# Patient Record
Sex: MALE | Race: WHITE | Employment: FULL TIME | URBAN - METROPOLITAN AREA
[De-identification: names, ages, dates, MRNs, and addresses within clinical notes are randomized per-mention and may not be internally consistent; named-entity substitution may affect disease eponyms.]

---

## 2018-10-02 ENCOUNTER — HOSPITAL ENCOUNTER (EMERGENCY)
Age: 53
Discharge: HOME | End: 2018-10-02
Attending: EMERGENCY MEDICINE
Payer: COMMERCIAL

## 2018-10-02 ENCOUNTER — APPOINTMENT (OUTPATIENT)
Dept: CT IMAGING | Age: 53
End: 2018-10-02
Attending: EMERGENCY MEDICINE
Payer: COMMERCIAL

## 2018-10-02 VITALS
OXYGEN SATURATION: 95 % | DIASTOLIC BLOOD PRESSURE: 86 MMHG | WEIGHT: 189.15 LBS | HEIGHT: 72 IN | HEART RATE: 80 BPM | TEMPERATURE: 98.2 F | SYSTOLIC BLOOD PRESSURE: 155 MMHG | BODY MASS INDEX: 25.62 KG/M2 | RESPIRATION RATE: 18 BRPM

## 2018-10-02 DIAGNOSIS — N13.2 HYDRONEPHROSIS WITH URINARY OBSTRUCTION DUE TO RENAL CALCULUS: ICD-10-CM

## 2018-10-02 DIAGNOSIS — N20.0 KIDNEY STONE: Primary | ICD-10-CM

## 2018-10-02 DIAGNOSIS — D72.829 LEUKOCYTOSIS, UNSPECIFIED TYPE: ICD-10-CM

## 2018-10-02 LAB
ALBUMIN SERPL-MCNC: 4.2 G/DL (ref 3.5–5)
ALBUMIN/GLOB SERPL: 1.1 {RATIO} (ref 1.1–2.2)
ALP SERPL-CCNC: 88 U/L (ref 45–117)
ALT SERPL-CCNC: 23 U/L (ref 12–78)
ANION GAP SERPL CALC-SCNC: 6 MMOL/L (ref 5–15)
APPEARANCE UR: ABNORMAL
AST SERPL-CCNC: 22 U/L (ref 15–37)
BACTERIA URNS QL MICRO: NEGATIVE /HPF
BASOPHILS # BLD: 0.1 K/UL (ref 0–0.1)
BASOPHILS NFR BLD: 0 % (ref 0–1)
BILIRUB SERPL-MCNC: 0.4 MG/DL (ref 0.2–1)
BILIRUB UR QL: NEGATIVE
BUN SERPL-MCNC: 18 MG/DL (ref 6–20)
BUN/CREAT SERPL: 12 (ref 12–20)
CALCIUM SERPL-MCNC: 9.3 MG/DL (ref 8.5–10.1)
CHLORIDE SERPL-SCNC: 106 MMOL/L (ref 97–108)
CO2 SERPL-SCNC: 28 MMOL/L (ref 21–32)
COLOR UR: ABNORMAL
CREAT SERPL-MCNC: 1.47 MG/DL (ref 0.7–1.3)
DIFFERENTIAL METHOD BLD: ABNORMAL
EOSINOPHIL # BLD: 0 K/UL (ref 0–0.4)
EOSINOPHIL NFR BLD: 0 % (ref 0–7)
EPITH CASTS URNS QL MICRO: ABNORMAL /LPF
ERYTHROCYTE [DISTWIDTH] IN BLOOD BY AUTOMATED COUNT: 13.5 % (ref 11.5–14.5)
GLOBULIN SER CALC-MCNC: 3.9 G/DL (ref 2–4)
GLUCOSE SERPL-MCNC: 135 MG/DL (ref 65–100)
GLUCOSE UR STRIP.AUTO-MCNC: NEGATIVE MG/DL
HCT VFR BLD AUTO: 48.1 % (ref 36.6–50.3)
HGB BLD-MCNC: 16.3 G/DL (ref 12.1–17)
HGB UR QL STRIP: ABNORMAL
HYALINE CASTS URNS QL MICRO: ABNORMAL /LPF (ref 0–5)
IMM GRANULOCYTES # BLD: 0.1 K/UL (ref 0–0.04)
IMM GRANULOCYTES NFR BLD AUTO: 0 % (ref 0–0.5)
KETONES UR QL STRIP.AUTO: NEGATIVE MG/DL
LACTATE SERPL-SCNC: 1.4 MMOL/L (ref 0.4–2)
LEUKOCYTE ESTERASE UR QL STRIP.AUTO: NEGATIVE
LIPASE SERPL-CCNC: 437 U/L (ref 73–393)
LYMPHOCYTES # BLD: 1 K/UL (ref 0.8–3.5)
LYMPHOCYTES NFR BLD: 6 % (ref 12–49)
MCH RBC QN AUTO: 29.4 PG (ref 26–34)
MCHC RBC AUTO-ENTMCNC: 33.9 G/DL (ref 30–36.5)
MCV RBC AUTO: 86.8 FL (ref 80–99)
MONOCYTES # BLD: 0.9 K/UL (ref 0–1)
MONOCYTES NFR BLD: 5 % (ref 5–13)
MUCOUS THREADS URNS QL MICRO: ABNORMAL /LPF
NEUTS SEG # BLD: 14.9 K/UL (ref 1.8–8)
NEUTS SEG NFR BLD: 88 % (ref 32–75)
NITRITE UR QL STRIP.AUTO: NEGATIVE
NRBC # BLD: 0 K/UL (ref 0–0.01)
NRBC BLD-RTO: 0 PER 100 WBC
PH UR STRIP: 5.5 [PH] (ref 5–8)
PLATELET # BLD AUTO: 308 K/UL (ref 150–400)
PMV BLD AUTO: 9.2 FL (ref 8.9–12.9)
POTASSIUM SERPL-SCNC: 4.4 MMOL/L (ref 3.5–5.1)
PROT SERPL-MCNC: 8.1 G/DL (ref 6.4–8.2)
PROT UR STRIP-MCNC: 30 MG/DL
RBC # BLD AUTO: 5.54 M/UL (ref 4.1–5.7)
RBC #/AREA URNS HPF: ABNORMAL /HPF (ref 0–5)
SODIUM SERPL-SCNC: 140 MMOL/L (ref 136–145)
SP GR UR REFRACTOMETRY: 1.03 (ref 1–1.03)
UROBILINOGEN UR QL STRIP.AUTO: 0.2 EU/DL (ref 0.2–1)
WBC # BLD AUTO: 16.9 K/UL (ref 4.1–11.1)
WBC URNS QL MICRO: ABNORMAL /HPF (ref 0–4)

## 2018-10-02 PROCEDURE — 96365 THER/PROPH/DIAG IV INF INIT: CPT

## 2018-10-02 PROCEDURE — 83605 ASSAY OF LACTIC ACID: CPT | Performed by: EMERGENCY MEDICINE

## 2018-10-02 PROCEDURE — 96361 HYDRATE IV INFUSION ADD-ON: CPT

## 2018-10-02 PROCEDURE — 74011000258 HC RX REV CODE- 258: Performed by: EMERGENCY MEDICINE

## 2018-10-02 PROCEDURE — 96375 TX/PRO/DX INJ NEW DRUG ADDON: CPT

## 2018-10-02 PROCEDURE — 83690 ASSAY OF LIPASE: CPT | Performed by: EMERGENCY MEDICINE

## 2018-10-02 PROCEDURE — 81001 URINALYSIS AUTO W/SCOPE: CPT | Performed by: EMERGENCY MEDICINE

## 2018-10-02 PROCEDURE — 74011250636 HC RX REV CODE- 250/636: Performed by: EMERGENCY MEDICINE

## 2018-10-02 PROCEDURE — 85025 COMPLETE CBC W/AUTO DIFF WBC: CPT | Performed by: EMERGENCY MEDICINE

## 2018-10-02 PROCEDURE — 80053 COMPREHEN METABOLIC PANEL: CPT | Performed by: EMERGENCY MEDICINE

## 2018-10-02 PROCEDURE — 99284 EMERGENCY DEPT VISIT MOD MDM: CPT

## 2018-10-02 PROCEDURE — 36415 COLL VENOUS BLD VENIPUNCTURE: CPT | Performed by: EMERGENCY MEDICINE

## 2018-10-02 PROCEDURE — 74177 CT ABD & PELVIS W/CONTRAST: CPT

## 2018-10-02 PROCEDURE — 74011636320 HC RX REV CODE- 636/320: Performed by: EMERGENCY MEDICINE

## 2018-10-02 RX ORDER — SODIUM CHLORIDE 0.9 % (FLUSH) 0.9 %
10 SYRINGE (ML) INJECTION
Status: COMPLETED | OUTPATIENT
Start: 2018-10-02 | End: 2018-10-02

## 2018-10-02 RX ORDER — MORPHINE SULFATE 2 MG/ML
2 INJECTION, SOLUTION INTRAMUSCULAR; INTRAVENOUS
Status: COMPLETED | OUTPATIENT
Start: 2018-10-02 | End: 2018-10-02

## 2018-10-02 RX ORDER — ONDANSETRON 2 MG/ML
4 INJECTION INTRAMUSCULAR; INTRAVENOUS
Status: COMPLETED | OUTPATIENT
Start: 2018-10-02 | End: 2018-10-02

## 2018-10-02 RX ORDER — OXYCODONE AND ACETAMINOPHEN 5; 325 MG/1; MG/1
1 TABLET ORAL
Qty: 10 TAB | Refills: 0 | Status: SHIPPED | OUTPATIENT
Start: 2018-10-02 | End: 2018-11-18 | Stop reason: CLARIF

## 2018-10-02 RX ORDER — SODIUM CHLORIDE 9 MG/ML
50 INJECTION, SOLUTION INTRAVENOUS
Status: COMPLETED | OUTPATIENT
Start: 2018-10-02 | End: 2018-10-02

## 2018-10-02 RX ORDER — ONDANSETRON 4 MG/1
4 TABLET, ORALLY DISINTEGRATING ORAL
Qty: 10 TAB | Refills: 0 | Status: SHIPPED | OUTPATIENT
Start: 2018-10-02 | End: 2018-11-18 | Stop reason: CLARIF

## 2018-10-02 RX ORDER — CEFUROXIME AXETIL 500 MG/1
500 TABLET ORAL 2 TIMES DAILY
Qty: 14 TAB | Refills: 0 | Status: SHIPPED | OUTPATIENT
Start: 2018-10-02 | End: 2018-10-09

## 2018-10-02 RX ADMIN — SODIUM CHLORIDE 50 ML/HR: 900 INJECTION, SOLUTION INTRAVENOUS at 12:35

## 2018-10-02 RX ADMIN — ONDANSETRON 4 MG: 2 INJECTION, SOLUTION INTRAMUSCULAR; INTRAVENOUS at 13:27

## 2018-10-02 RX ADMIN — Medication 10 ML: at 12:35

## 2018-10-02 RX ADMIN — MORPHINE SULFATE 2 MG: 2 INJECTION, SOLUTION INTRAMUSCULAR; INTRAVENOUS at 13:27

## 2018-10-02 RX ADMIN — CEFTRIAXONE 1 G: 1 INJECTION, POWDER, FOR SOLUTION INTRAMUSCULAR; INTRAVENOUS at 13:41

## 2018-10-02 RX ADMIN — IOPAMIDOL 100 ML: 755 INJECTION, SOLUTION INTRAVENOUS at 12:35

## 2018-10-02 RX ADMIN — SODIUM CHLORIDE 1000 ML: 900 INJECTION, SOLUTION INTRAVENOUS at 12:13

## 2018-10-02 NOTE — DISCHARGE INSTRUCTIONS
Kidney Stone: Care Instructions  Your Care Instructions    Kidney stones are formed when salts, minerals, and other substances normally found in the urine clump together. They can be as small as grains of sand or, rarely, as large as golf balls. While the stone is traveling through the ureter, which is the tube that carries urine from the kidney to the bladder, you will probably feel pain. The pain may be mild or very severe. You may also have some blood in your urine. As soon as the stone reaches the bladder, any intense pain should go away. If a stone is too large to pass on its own, you may need a medical procedure to help you pass the stone. The doctor has checked you carefully, but problems can develop later. If you notice any problems or new symptoms, get medical treatment right away. Follow-up care is a key part of your treatment and safety. Be sure to make and go to all appointments, and call your doctor if you are having problems. It's also a good idea to know your test results and keep a list of the medicines you take. How can you care for yourself at home? · Drink plenty of fluids, enough so that your urine is light yellow or clear like water. If you have kidney, heart, or liver disease and have to limit fluids, talk with your doctor before you increase the amount of fluids you drink. · Take pain medicines exactly as directed. Call your doctor if you think you are having a problem with your medicine. ¨ If the doctor gave you a prescription medicine for pain, take it as prescribed. ¨ If you are not taking a prescription pain medicine, ask your doctor if you can take an over-the-counter medicine. Read and follow all instructions on the label. · Your doctor may ask you to strain your urine so that you can collect your kidney stone when it passes. You can use a kitchen strainer or a tea strainer to catch the stone. Store it in a plastic bag until you see your doctor again.   Preventing future kidney stones  Some changes in your diet may help prevent kidney stones. Depending on the cause of your stones, your doctor may recommend that you:  · Drink plenty of fluids, enough so that your urine is light yellow or clear like water. If you have kidney, heart, or liver disease and have to limit fluids, talk with your doctor before you increase the amount of fluids you drink. · Limit coffee, tea, and alcohol. Also avoid grapefruit juice. · Do not take more than the recommended daily dose of vitamins C and D.  · Avoid antacids such as Gaviscon, Maalox, Mylanta, or Tums. · Limit the amount of salt (sodium) in your diet. · Eat a balanced diet that is not too high in protein. · Limit foods that are high in a substance called oxalate, which can cause kidney stones. These foods include dark green vegetables, rhubarb, chocolate, wheat bran, nuts, cranberries, and beans. When should you call for help? Call your doctor now or seek immediate medical care if:    · You cannot keep down fluids.     · Your pain gets worse.     · You have a fever or chills.     · You have new or worse pain in your back just below your rib cage (the flank area).     · You have new or more blood in your urine.    Watch closely for changes in your health, and be sure to contact your doctor if:    · You do not get better as expected. Where can you learn more? Go to http://aydee-tom.info/. Enter Z305 in the search box to learn more about \"Kidney Stone: Care Instructions. \"  Current as of: May 12, 2017  Content Version: 11.7  © 4510-5480 [employer]. Care instructions adapted under license by [employer] (which disclaims liability or warranty for this information). If you have questions about a medical condition or this instruction, always ask your healthcare professional. [address] any warranty or liability for your use of this information.   [employer] Activation    Thank you for requesting access to [employer]ocket[employer]. Please follow the instructions below to securely access and download your online medical record. PocketMobile allows you to send messages to your doctor, view your test results, renew your prescriptions, schedule appointments, and more. How Do I Sign Up? 1. In your internet browser, go to [email or web address]  2. Click on the First Time User? Click Here link in the Sign In box. You will be redirect to the New Member Sign Up page. 3. Enter your PocketMobile Access Code exactly as it appears below. You will not need to use this code after youve completed the sign-up process. If you do not sign up before the expiration date, you must request a new code. PocketMobile Access Code: [email or web address]  Expires: 2018  9:15 AM (This is the date your PocketMobile access code will )    4. Enter the last four digits of your Social Security Number (xxxx) and Date of Birth (mm/dd/yyyy) as indicated and click Submit. You will be taken to the next sign-up page. 5. Create a PocketMobile ID. This will be your PocketMobile login ID and cannot be changed, so think of one that is secure and easy to remember. 6. Create a PocketMobile password. You can change your password at any time. 7. Enter your Password Reset Question and Answer. This can be used at a later time if you forget your password. 8. Enter your e-mail address. You will receive e-mail notification when new information is available in [address] 9. Click Sign Up. You can now view and download portions of your medical record. 10. Click the Download Summary menu link to download a portable copy of your medical information. Additional Information    If you have questions, please visit the Frequently Asked Questions section of the PocketMobile website at [email or web address] Remember, PocketMobile is NOT to be used for urgent needs. For medical emergencies, dial 911.

## 2018-10-02 NOTE — LETTER
[address] 
[facility] EMERGENCY DEPT 
[address] [facility] [id number] [phone] Work/School Note Date: 10/2/2018 To Whom It May concern: 
 
Tanya Del Cid was seen and treated today in the emergency room by the following provider(s): 
Attending Provider: Aditya Velasquez MD. Tanya Del Cid No work till 10/4/18 Sincerely, Aditya Velasquez MD

## 2018-10-02 NOTE — ED PROVIDER NOTES
EMERGENCY DEPARTMENT HISTORY AND PHYSICAL EXAM 
 
 
Date: 10/2/2018 Patient Name: Princess Jacobson History of Presenting Illness Chief Complaint Patient presents with  Abdominal Pain The patient presents to the ED with complaints of abdominal pain with nausea and vomiting that started last night.  Nausea  Vomiting History Provided By: Patient HPI: Princess Jacobson, 46 y.o. male with no pertinent PMHx, presents via wheelchair to the ED with cc of new onset of generalized abdominal cramping progressively worsening x last night. Pt reports associated sx of nausea, vomiting, and constipation. He expresses he began with generalized abdominal cramping last night with episodes of nausea and vomiting noting he initially attributed his sx to constipation. Pt discloses his LBM was 2-3 days ago which is abnormal for him. He notes a family h/o colon cancer and became worried leading him to the ED. Pt denies ever having a colonoscopy. There are no exacerbating or alleviating factors to his discomfort. He denies any fevers, chills, chest pain, SOB, diarrhea, hematochezia, melena, or dysuria. There are no other complaints, changes, or physical findings at this time. PCP: Lay Silva MD 
SHx: (+) Tobacco; (+) ETOH: occasional; (-) illicit drug use Past History Past Medical History: No past medical history on file. Past Surgical History: No past surgical history on file. Family History: No family history on file. Social History: 
Social History Substance Use Topics  Smoking status: Not on file  Smokeless tobacco: Not on file  Alcohol use Not on file Allergies: 
No Known Allergies Review of Systems Review of Systems Constitutional: Negative. Negative for chills and fever. HENT: Negative. Negative for congestion, rhinorrhea and sinus pressure. Eyes: Negative. Negative for discharge and redness. Respiratory: Negative. Negative for cough, chest tightness and shortness of breath. Cardiovascular: Negative. Negative for chest pain. Gastrointestinal: Positive for abdominal pain (diffuse cramping ), constipation, nausea and vomiting. Negative for blood in stool and diarrhea. Endocrine: Negative. Genitourinary: Negative. Negative for flank pain and hematuria. Musculoskeletal: Negative. Negative for back pain. Skin: Negative. Negative for rash. Neurological: Negative. Negative for dizziness, seizures, weakness, numbness and headaches. Hematological: Negative. All other systems reviewed and are negative. Physical Exam  
Physical Exam  
Constitutional: He is oriented to person, place, and time. He appears well-developed and well-nourished. No distress. HENT:  
Head: Normocephalic and atraumatic. Nose: Nose normal.  
Mouth/Throat: No oropharyngeal exudate. Eyes: Conjunctivae and EOM are normal. Pupils are equal, round, and reactive to light. No scleral icterus. Neck: Normal range of motion. Neck supple. No JVD present. No thyromegaly present. Cardiovascular: Normal rate, regular rhythm, normal heart sounds, intact distal pulses and normal pulses. PMI is not displaced. Exam reveals no gallop and no friction rub. No murmur heard. Pulmonary/Chest: Effort normal and breath sounds normal. No stridor. No respiratory distress. He has no decreased breath sounds. He has no wheezes. He has no rhonchi. He has no rales. He exhibits no tenderness. Abdominal: Soft. Normal aorta and bowel sounds are normal. He exhibits no distension, no abdominal bruit and no mass. There is no hepatosplenomegaly. There is tenderness in the right upper quadrant, right lower quadrant and periumbilical area. There is no rigidity, no rebound, no guarding, no CVA tenderness, no tenderness at McBurney's point and negative Aguayo's sign. No hernia. Neurological: He is alert and oriented to person, place, and time. He has normal reflexes. He displays no atrophy and no tremor. No cranial nerve deficit or sensory deficit. He exhibits normal muscle tone. He displays no seizure activity. Coordination normal. GCS eye subscore is 4. GCS verbal subscore is 5. GCS motor subscore is 6. Reflex Scores: 
     Patellar reflexes are 2+ on the right side and 2+ on the left side. Skin: Skin is warm. No rash noted. He is not diaphoretic. No erythema. Nursing note and vitals reviewed. Diagnostic Study Results Labs - Recent Results (from the past 12 hour(s)) URINALYSIS W/ RFLX MICROSCOPIC Collection Time: 10/02/18 10:15 AM  
Result Value Ref Range Color YELLOW/STRAW Appearance TURBID (A) CLEAR Specific gravity 1.026 1.003 - 1.030    
 pH (UA) 5.5 5.0 - 8.0 Protein 30 (A) NEG mg/dL Glucose NEGATIVE  NEG mg/dL Ketone NEGATIVE  NEG mg/dL Bilirubin NEGATIVE  NEG Blood TRACE (A) NEG Urobilinogen 0.2 0.2 - 1.0 EU/dL Nitrites NEGATIVE  NEG Leukocyte Esterase NEGATIVE  NEG    
 WBC 0-4 0 - 4 /hpf  
 RBC 0-5 0 - 5 /hpf Epithelial cells FEW FEW /lpf Bacteria NEGATIVE  NEG /hpf Mucus 1+ (A) NEG /lpf Hyaline cast 0-2 0 - 5 /lpf  
CBC WITH AUTOMATED DIFF Collection Time: 10/02/18 10:30 AM  
Result Value Ref Range WBC 16.9 (H) 4.1 - 11.1 K/uL  
 RBC 5.54 4.10 - 5.70 M/uL  
 HGB 16.3 12.1 - 17.0 g/dL HCT 48.1 36.6 - 50.3 % MCV 86.8 80.0 - 99.0 FL  
 MCH 29.4 26.0 - 34.0 PG  
 MCHC 33.9 30.0 - 36.5 g/dL  
 RDW 13.5 11.5 - 14.5 % PLATELET 454 231 - 742 K/uL MPV 9.2 8.9 - 12.9 FL  
 NRBC 0.0 0  WBC ABSOLUTE NRBC 0.00 0.00 - 0.01 K/uL NEUTROPHILS 88 (H) 32 - 75 % LYMPHOCYTES 6 (L) 12 - 49 % MONOCYTES 5 5 - 13 % EOSINOPHILS 0 0 - 7 % BASOPHILS 0 0 - 1 % IMMATURE GRANULOCYTES 0 0.0 - 0.5 % ABS. NEUTROPHILS 14.9 (H) 1.8 - 8.0 K/UL  
 ABS. LYMPHOCYTES 1.0 0.8 - 3.5 K/UL ABS. MONOCYTES 0.9 0.0 - 1.0 K/UL  
 ABS. EOSINOPHILS 0.0 0.0 - 0.4 K/UL  
 ABS. BASOPHILS 0.1 0.0 - 0.1 K/UL  
 ABS. IMM. GRANS. 0.1 (H) 0.00 - 0.04 K/UL  
 DF AUTOMATED METABOLIC PANEL, COMPREHENSIVE Collection Time: 10/02/18 10:30 AM  
Result Value Ref Range Sodium 140 136 - 145 mmol/L Potassium 4.4 3.5 - 5.1 mmol/L Chloride 106 97 - 108 mmol/L  
 CO2 28 21 - 32 mmol/L Anion gap 6 5 - 15 mmol/L Glucose 135 (H) 65 - 100 mg/dL BUN 18 6 - 20 MG/DL Creatinine 1.47 (H) 0.70 - 1.30 MG/DL  
 BUN/Creatinine ratio 12 12 - 20 GFR est AA >60 >60 ml/min/1.73m2 GFR est non-AA 50 (L) >60 ml/min/1.73m2 Calcium 9.3 8.5 - 10.1 MG/DL Bilirubin, total 0.4 0.2 - 1.0 MG/DL  
 ALT (SGPT) 23 12 - 78 U/L  
 AST (SGOT) 22 15 - 37 U/L Alk. phosphatase 88 45 - 117 U/L Protein, total 8.1 6.4 - 8.2 g/dL Albumin 4.2 3.5 - 5.0 g/dL Globulin 3.9 2.0 - 4.0 g/dL A-G Ratio 1.1 1.1 - 2.2 LIPASE Collection Time: 10/02/18 10:30 AM  
Result Value Ref Range Lipase 437 (H) 73 - 393 U/L  
LACTIC ACID Collection Time: 10/02/18 12:14 PM  
Result Value Ref Range Lactic acid 1.4 0.4 - 2.0 MMOL/L Radiologic Studies -  
CT Results  (Last 48 hours) 10/02/18 1235  CT ABD PELV W CONT Final result Impression:  IMPRESSION: 2 mm right ureterovesical junction calculus with obstruction,  
hydronephrosis, hydroureter and perinephric and periureteral stranding. Chester Sheppard Narrative:  EXAM: CT ABDOMEN PELVIS WITH CONTRAST INDICATION: Right lower quadrant pain. .  
COMPARISON: None. CONTRAST: 100 mL of Isovue-370. TECHNIQUE:   
Multislice helical CT was performed from the diaphragm to the symphysis pubis  
during uneventful rapid bolus intravenous contrast administration. Oral contrast  
was not administered. Contiguous 5 mm axial images were reconstructed and lung  
and soft tissue windows were generated. Coronal and sagittal reformations were generated. CT dose reduction was achieved through use of a standardized protocol  
tailored for this examination and automatic exposure control for dose  
modulation. FINDINGS:  
LOWER CHEST: The visualized portions of the lung bases are clear. ABDOMEN:  
Liver: The liver is normal in size and contour with no focal abnormality. Gallbladder and bile ducts: There is no calcified gallstone or biliary  
dilatation. Spleen: No abnormality. Pancreas: No abnormality. Adrenal glands: No abnormality. Kidneys: The right kidney is enlarged with perinephric stranding and there is  
hydronephrosis and hydroureter down to the level of the 2 mm stone at the right  
ureterovesical junction. There is also perirenal stranding. There is no stone or  
other abnormality on the left. PELVIS:  
Reproductive organs: The prostate gland and seminal vesicles are symmetrical.   
Bladder: No abnormality. BOWEL AND MESENTERY: The small bowel is normal.  There is no mesenteric mass or  
adenopathy. The appendix is normal.  
PERITONEUM: There is no ascites or free intraperitoneal air. RETROPERITONEUM: The aorta  tapers without aneurysm. There is no retroperitoneal  
adenopathy or mass. There is no pelvic mass or adenopathy. BONES AND SOFT TISSUES: The bones and soft tissues of the abdominal wall are  
within normal limits. Medical Decision Making I am the first provider for this patient. I reviewed the vital signs, available nursing notes, past medical history, past surgical history, family history and social history. Vital Signs-Reviewed the patient's vital signs. Patient Vitals for the past 12 hrs: 
 Temp Pulse Resp BP SpO2  
10/02/18 1309 - - - 149/88 -  
10/02/18 1130 - - - - 96 % 10/02/18 0918 98.2 °F (36.8 °C) 80 18 (!) 165/101 97 % Pulse Oximetry Analysis - 97% on room air Cardiac Monitor:  
Rate: 80 bpm 
Rhythm: Normal Sinus Rhythm Records Reviewed: Nursing Notes, Old Medical Records, Previous electrocardiograms and Previous Radiology Studies Provider Notes (Medical Decision Making): DDx: Appendicitis, diverticulitis, perforated viscus, bowel obstruction, hernia, AAA, constipation, kidney stone Impression Plan: healthy male presents with progressive abdominal pain, nausea, vomiting since last night. Clinically tender RLQ. Will obtain CT, labs and make final disposition pending those results. ED Course:  
Initial assessment performed. The patients presenting problems have been discussed, and they are in agreement with the care plan formulated and outlined with them. I have encouraged them to ask questions as they arise throughout their visit. Progress Notes: 
 
1:10 PM  
The pt has been re-evaluated. Pt was updated on CT significant for kidney stone. Pt is requesting medication for discomfort. Will provide symptomatic management and consult urology. CONSULT NOTE: 
1:30 PM 
Kristie Walker MD spoke with Dr. Srikanth Echevarria, Specialty: Urology Discussed pt's hx, disposition, and available diagnostic and imaging results. Reviewed care plans. Consultant recommends if the pt's pain and nausea can be controlled he is stable for discharge home with follow up in office tomorrow. Consultant advises starting the pt on abx. Written by ELMO Daniel, as dictated by Kristie Walker MD 
 
1:37 PM  
The pt has been re-evaluated. Pt has no signs of sepsis, has tolerated PO. Will try outpatient follow up close to urology. Tobacco Counseling Discussed the risks of smoking and the benefits of smoking cessation as well as the long term sequelae of smoking with the patient. The patient verbalized their understanding. Critical Care Time: 0 minutes Disposition: 
Discharge Note: 
1:37 PM 
The patient is ready for discharge.  The patient's signs, symptoms, diagnosis, and discharge instruction have been discussed and the patient has conveyed their understanding. The patient is to follow up as recommended or return to the ER should their symptoms worsen. Plan has been discussed and the patient is in agreement. Written by Alden Brantley ED Scribe, as dictated by Jaleesa Carrizales MD 
 
PLAN: 
1. Current Discharge Medication List  
  
START taking these medications Details  
cefUROXime (CEFTIN) 500 mg tablet Take 1 Tab by mouth two (2) times a day for 7 days. Qty: 14 Tab, Refills: 0 Associated Diagnoses: Kidney stone  
  
oxyCODONE-acetaminophen (PERCOCET) 5-325 mg per tablet Take 1 Tab by mouth every four (4) hours as needed for Pain. Max Daily Amount: 6 Tabs. Qty: 10 Tab, Refills: 0 Associated Diagnoses: Kidney stone  
  
ondansetron (ZOFRAN ODT) 4 mg disintegrating tablet Take 1 Tab by mouth every eight (8) hours as needed for Nausea. Qty: 10 Tab, Refills: 0 Associated Diagnoses: Kidney stone 2. Follow-up Information Follow up With Details Comments Contact Info Jaswant Hanson MD Schedule an appointment as soon as possible for a visit in 1 day  [facility] [address] [facility] 
[phone] [facility] EMERGENCY DEPT  If symptoms worsen [address] [facility] 
[phone] Return to ED if worse Diagnosis Clinical Impression: 1. Kidney stone 2. Hydronephrosis with urinary obstruction due to renal calculus 3. Leukocytosis, unspecified type Attestations: 
 
Attestation: This note is prepared by Kasey Lombard. Barrett, acting as Scribe for Jaleesa Carrizales MD. Jaleesa Carrizales MD: The scribe's documentation has been prepared under my direction and personally reviewed by me in its entirety. I confirm that the note above accurately reflects all work, treatment, procedures, and medical decision making performed by me.

## 2018-10-02 NOTE — ED NOTES
Patient discharge instructions reviewed with patient by MD Sg Beckett. Patient verbalized understanding. Per MD, patient ok for discharge after antibiotics complete.

## 2018-10-02 NOTE — ED NOTES
Patient back from CT, reattached to monitor. MD Minus  came to bedside to update patient on plan of care.

## 2018-11-18 ENCOUNTER — HOSPITAL ENCOUNTER (INPATIENT)
Age: 53
LOS: 7 days | Discharge: HOME HEALTH | DRG: 234 | End: 2018-11-25
Attending: EMERGENCY MEDICINE | Admitting: THORACIC SURGERY (CARDIOTHORACIC VASCULAR SURGERY)
Payer: COMMERCIAL

## 2018-11-18 ENCOUNTER — APPOINTMENT (OUTPATIENT)
Dept: GENERAL RADIOLOGY | Age: 53
DRG: 234 | End: 2018-11-18
Attending: EMERGENCY MEDICINE
Payer: COMMERCIAL

## 2018-11-18 DIAGNOSIS — I20.8 ANGINA EFFORT: ICD-10-CM

## 2018-11-18 DIAGNOSIS — I24.9 ACS (ACUTE CORONARY SYNDROME) (HCC): ICD-10-CM

## 2018-11-18 DIAGNOSIS — Z95.1 S/P CABG X 2: ICD-10-CM

## 2018-11-18 DIAGNOSIS — R77.8 ELEVATED TROPONIN: ICD-10-CM

## 2018-11-18 DIAGNOSIS — I21.4 NSTEMI (NON-ST ELEVATED MYOCARDIAL INFARCTION) (HCC): Primary | ICD-10-CM

## 2018-11-18 PROBLEM — F17.200 TOBACCO USE DISORDER: Status: ACTIVE | Noted: 2018-11-18

## 2018-11-18 PROBLEM — E78.2 MIXED HYPERLIPIDEMIA: Status: ACTIVE | Noted: 2018-11-18

## 2018-11-18 LAB
ALBUMIN SERPL-MCNC: 4 G/DL (ref 3.5–5)
ALBUMIN/GLOB SERPL: 1.3 {RATIO} (ref 1.1–2.2)
ALP SERPL-CCNC: 80 U/L (ref 45–117)
ALT SERPL-CCNC: 30 U/L (ref 12–78)
ANION GAP SERPL CALC-SCNC: 5 MMOL/L (ref 5–15)
AST SERPL-CCNC: 27 U/L (ref 15–37)
BASOPHILS # BLD: 0.1 K/UL (ref 0–0.1)
BASOPHILS NFR BLD: 1 % (ref 0–1)
BILIRUB SERPL-MCNC: 0.5 MG/DL (ref 0.2–1)
BUN SERPL-MCNC: 13 MG/DL (ref 6–20)
BUN/CREAT SERPL: 12 (ref 12–20)
CALCIUM SERPL-MCNC: 9 MG/DL (ref 8.5–10.1)
CHLORIDE SERPL-SCNC: 106 MMOL/L (ref 97–108)
CK SERPL-CCNC: 227 U/L (ref 39–308)
CO2 SERPL-SCNC: 30 MMOL/L (ref 21–32)
CREAT SERPL-MCNC: 1.09 MG/DL (ref 0.7–1.3)
DIFFERENTIAL METHOD BLD: ABNORMAL
EOSINOPHIL # BLD: 0.1 K/UL (ref 0–0.4)
EOSINOPHIL NFR BLD: 1 % (ref 0–7)
ERYTHROCYTE [DISTWIDTH] IN BLOOD BY AUTOMATED COUNT: 13.8 % (ref 11.5–14.5)
GLOBULIN SER CALC-MCNC: 3.2 G/DL (ref 2–4)
GLUCOSE SERPL-MCNC: 107 MG/DL (ref 65–100)
HCT VFR BLD AUTO: 44.3 % (ref 36.6–50.3)
HGB BLD-MCNC: 15.1 G/DL (ref 12.1–17)
IMM GRANULOCYTES # BLD: 0.1 K/UL (ref 0–0.04)
IMM GRANULOCYTES NFR BLD AUTO: 0 % (ref 0–0.5)
LYMPHOCYTES # BLD: 2.2 K/UL (ref 0.8–3.5)
LYMPHOCYTES NFR BLD: 18 % (ref 12–49)
MCH RBC QN AUTO: 29.8 PG (ref 26–34)
MCHC RBC AUTO-ENTMCNC: 34.1 G/DL (ref 30–36.5)
MCV RBC AUTO: 87.5 FL (ref 80–99)
MONOCYTES # BLD: 0.7 K/UL (ref 0–1)
MONOCYTES NFR BLD: 5 % (ref 5–13)
NEUTS SEG # BLD: 9.3 K/UL (ref 1.8–8)
NEUTS SEG NFR BLD: 76 % (ref 32–75)
NRBC # BLD: 0 K/UL (ref 0–0.01)
NRBC BLD-RTO: 0 PER 100 WBC
PLATELET # BLD AUTO: 311 K/UL (ref 150–400)
PMV BLD AUTO: 9.3 FL (ref 8.9–12.9)
POTASSIUM SERPL-SCNC: 3.5 MMOL/L (ref 3.5–5.1)
PROT SERPL-MCNC: 7.2 G/DL (ref 6.4–8.2)
RBC # BLD AUTO: 5.06 M/UL (ref 4.1–5.7)
SODIUM SERPL-SCNC: 141 MMOL/L (ref 136–145)
TROPONIN I SERPL-MCNC: 2.15 NG/ML
WBC # BLD AUTO: 12.4 K/UL (ref 4.1–11.1)

## 2018-11-18 PROCEDURE — 36415 COLL VENOUS BLD VENIPUNCTURE: CPT

## 2018-11-18 PROCEDURE — 74011250637 HC RX REV CODE- 250/637: Performed by: INTERNAL MEDICINE

## 2018-11-18 PROCEDURE — 65660000000 HC RM CCU STEPDOWN

## 2018-11-18 PROCEDURE — 74011250637 HC RX REV CODE- 250/637: Performed by: EMERGENCY MEDICINE

## 2018-11-18 PROCEDURE — 80053 COMPREHEN METABOLIC PANEL: CPT

## 2018-11-18 PROCEDURE — 71046 X-RAY EXAM CHEST 2 VIEWS: CPT

## 2018-11-18 PROCEDURE — 82550 ASSAY OF CK (CPK): CPT

## 2018-11-18 PROCEDURE — 84484 ASSAY OF TROPONIN QUANT: CPT

## 2018-11-18 PROCEDURE — 74011250636 HC RX REV CODE- 250/636: Performed by: INTERNAL MEDICINE

## 2018-11-18 PROCEDURE — 93005 ELECTROCARDIOGRAM TRACING: CPT

## 2018-11-18 PROCEDURE — 96372 THER/PROPH/DIAG INJ SC/IM: CPT

## 2018-11-18 PROCEDURE — 99285 EMERGENCY DEPT VISIT HI MDM: CPT

## 2018-11-18 PROCEDURE — 85025 COMPLETE CBC W/AUTO DIFF WBC: CPT

## 2018-11-18 RX ORDER — ZOLPIDEM TARTRATE 5 MG/1
10 TABLET ORAL
Status: DISCONTINUED | OUTPATIENT
Start: 2018-11-18 | End: 2018-11-20

## 2018-11-18 RX ORDER — ATORVASTATIN CALCIUM 40 MG/1
40 TABLET, FILM COATED ORAL DAILY
Status: DISCONTINUED | OUTPATIENT
Start: 2018-11-18 | End: 2018-11-25 | Stop reason: HOSPADM

## 2018-11-18 RX ORDER — SODIUM CHLORIDE 0.9 % (FLUSH) 0.9 %
5-10 SYRINGE (ML) INJECTION EVERY 8 HOURS
Status: DISCONTINUED | OUTPATIENT
Start: 2018-11-18 | End: 2018-11-20

## 2018-11-18 RX ORDER — ENOXAPARIN SODIUM 100 MG/ML
1 INJECTION SUBCUTANEOUS ONCE
Status: COMPLETED | OUTPATIENT
Start: 2018-11-18 | End: 2018-11-18

## 2018-11-18 RX ORDER — NITROGLYCERIN 0.4 MG/1
0.4 TABLET SUBLINGUAL
Status: DISCONTINUED | OUTPATIENT
Start: 2018-11-18 | End: 2018-11-20

## 2018-11-18 RX ORDER — METOPROLOL TARTRATE 25 MG/1
25 TABLET, FILM COATED ORAL EVERY 12 HOURS
Status: DISCONTINUED | OUTPATIENT
Start: 2018-11-18 | End: 2018-11-20

## 2018-11-18 RX ORDER — SODIUM CHLORIDE 9 MG/ML
100 INJECTION, SOLUTION INTRAVENOUS CONTINUOUS
Status: DISCONTINUED | OUTPATIENT
Start: 2018-11-19 | End: 2018-11-20

## 2018-11-18 RX ORDER — ASPIRIN 81 MG/1
81 TABLET ORAL DAILY
Status: DISCONTINUED | OUTPATIENT
Start: 2018-11-19 | End: 2018-11-25 | Stop reason: HOSPADM

## 2018-11-18 RX ORDER — GUAIFENESIN 100 MG/5ML
162 LIQUID (ML) ORAL
Status: COMPLETED | OUTPATIENT
Start: 2018-11-18 | End: 2018-11-18

## 2018-11-18 RX ORDER — SODIUM CHLORIDE 0.9 % (FLUSH) 0.9 %
5-10 SYRINGE (ML) INJECTION AS NEEDED
Status: DISCONTINUED | OUTPATIENT
Start: 2018-11-18 | End: 2018-11-20

## 2018-11-18 RX ADMIN — Medication 10 ML: at 22:38

## 2018-11-18 RX ADMIN — ENOXAPARIN SODIUM 90 MG: 100 INJECTION, SOLUTION INTRAVENOUS; SUBCUTANEOUS at 18:22

## 2018-11-18 RX ADMIN — NITROGLYCERIN 1 INCH: 20 OINTMENT TOPICAL at 18:23

## 2018-11-18 RX ADMIN — METOPROLOL TARTRATE 25 MG: 25 TABLET ORAL at 22:37

## 2018-11-18 RX ADMIN — ASPIRIN 81 MG 162 MG: 81 TABLET ORAL at 17:14

## 2018-11-18 RX ADMIN — ATORVASTATIN CALCIUM 40 MG: 40 TABLET, FILM COATED ORAL at 18:23

## 2018-11-18 NOTE — ED PROVIDER NOTES
EMERGENCY DEPARTMENT HISTORY AND PHYSICAL EXAM 
 
 
Date: 11/18/2018 Patient Name: Jocelyne Tirado History of Presenting Illness Chief Complaint Patient presents with  Chest Pain  
  pt reports chest pain with left arm numbness today around noon while weed eating, reports he stopped and laid down, pain returned with he was going up and down steps History Provided By: Patient HPI: Jocelyne Tirado, 46 y.o. male with no significant PMHx, presents himself w/ family to the ED with cc of intermittent, sharp mid CP since today (11/18/18). Pt reports associated left arm numbness since onset of pain. He states that initial episode of CP came onto him while he was weeding approximately 6hrs ago today. Pt states that the episode of CP lasted for about 5 minutes until it resolved. He reports of a second episode of CP at around 3:00 PM of which he took a baby ASA. Pt notes no significant improvement of pain with taking the baby ASA. He endorses exacerbation of pain with exertion. Pt denies any recent long distance travels  Additionally, pt specifically denies any recent fever, chills, headache, nausea, vomiting, diarrhea, abdominal pain, SOB, lightheadedness, dizziness, weakness, BLE swelling, heart palpitations, urinary sxs, changes in BM, changes in PO intake, melena, hematochezia, cough, or congestion. PCP: Lani Campos MD 
 
Social Hx: +tobacco, -EtOH, -Illicit Drugs There are no other complaints, changes or physical findings at this time. Past History Past Medical History: No past medical history on file. Past Surgical History: No past surgical history on file. Family History: No family history on file. Social History: 
Social History Tobacco Use  Smoking status: Not on file Substance Use Topics  Alcohol use: Not on file  Drug use: Not on file Allergies: 
No Known Allergies Review of Systems Review of Systems Constitutional: Negative. Negative for chills and fever. HENT: Negative. Negative for congestion, facial swelling, rhinorrhea, sore throat, trouble swallowing and voice change. Eyes: Negative. Respiratory: Negative. Negative for apnea, cough, chest tightness, shortness of breath and wheezing. Cardiovascular: Positive for chest pain (mid). Negative for palpitations and leg swelling. Gastrointestinal: Negative. Negative for abdominal distention, abdominal pain, blood in stool, constipation, diarrhea, nausea and vomiting. Endocrine: Negative. Negative for cold intolerance, heat intolerance and polyuria. Genitourinary: Negative. Negative for difficulty urinating, dysuria, flank pain, frequency, hematuria and urgency. Musculoskeletal: Negative. Negative for arthralgias, back pain, myalgias, neck pain and neck stiffness. Skin: Negative. Negative for color change and rash. Neurological: Positive for numbness (left arm). Negative for dizziness, syncope, facial asymmetry, speech difficulty, weakness, light-headedness and headaches. Hematological: Negative. Does not bruise/bleed easily. Psychiatric/Behavioral: Negative. Negative for confusion and self-injury. The patient is not nervous/anxious. Physical Exam  
Physical Exam  
Constitutional: He is oriented to person, place, and time. He appears well-nourished. He is cooperative. Non-toxic appearance. He has a sickly appearance. He appears ill. He appears distressed. HENT:  
Head: Atraumatic. Mouth/Throat: Mucous membranes are normal. No posterior oropharyngeal erythema. Eyes: Conjunctivae and EOM are normal. Pupils are equal, round, and reactive to light. Neck: Normal range of motion. Cardiovascular: Normal rate, regular rhythm, normal heart sounds and intact distal pulses. Exam reveals no gallop and no friction rub. No murmur heard.  
Pulmonary/Chest: Effort normal and breath sounds normal. No respiratory distress. He has no wheezes. He has no rales. He exhibits no tenderness. Abdominal: Soft. Bowel sounds are normal. He exhibits no distension and no mass. There is no tenderness. There is no rebound and no guarding. Musculoskeletal: Normal range of motion. He exhibits no edema, tenderness or deformity. Neurological: He is alert and oriented to person, place, and time. He displays normal reflexes. No cranial nerve deficit. He exhibits normal muscle tone. Coordination normal.  
Skin: Skin is warm. No rash noted. Psychiatric: He has a normal mood and affect. Nursing note and vitals reviewed. Diagnostic Study Results Labs - Recent Results (from the past 12 hour(s)) CBC WITH AUTOMATED DIFF Collection Time: 11/18/18  5:02 PM  
Result Value Ref Range WBC 12.4 (H) 4.1 - 11.1 K/uL  
 RBC 5.06 4.10 - 5.70 M/uL  
 HGB 15.1 12.1 - 17.0 g/dL HCT 44.3 36.6 - 50.3 % MCV 87.5 80.0 - 99.0 FL  
 MCH 29.8 26.0 - 34.0 PG  
 MCHC 34.1 30.0 - 36.5 g/dL  
 RDW 13.8 11.5 - 14.5 % PLATELET 154 933 - 324 K/uL MPV 9.3 8.9 - 12.9 FL  
 NRBC 0.0 0  WBC ABSOLUTE NRBC 0.00 0.00 - 0.01 K/uL NEUTROPHILS 76 (H) 32 - 75 % LYMPHOCYTES 18 12 - 49 % MONOCYTES 5 5 - 13 % EOSINOPHILS 1 0 - 7 % BASOPHILS 1 0 - 1 % IMMATURE GRANULOCYTES 0 0.0 - 0.5 % ABS. NEUTROPHILS 9.3 (H) 1.8 - 8.0 K/UL  
 ABS. LYMPHOCYTES 2.2 0.8 - 3.5 K/UL  
 ABS. MONOCYTES 0.7 0.0 - 1.0 K/UL  
 ABS. EOSINOPHILS 0.1 0.0 - 0.4 K/UL  
 ABS. BASOPHILS 0.1 0.0 - 0.1 K/UL  
 ABS. IMM. GRANS. 0.1 (H) 0.00 - 0.04 K/UL  
 DF AUTOMATED METABOLIC PANEL, COMPREHENSIVE Collection Time: 11/18/18  5:02 PM  
Result Value Ref Range Sodium 141 136 - 145 mmol/L Potassium 3.5 3.5 - 5.1 mmol/L Chloride 106 97 - 108 mmol/L  
 CO2 30 21 - 32 mmol/L Anion gap 5 5 - 15 mmol/L Glucose 107 (H) 65 - 100 mg/dL BUN 13 6 - 20 MG/DL  Creatinine 1.09 0.70 - 1.30 MG/DL  
 BUN/Creatinine ratio 12 12 - 20    
 GFR est AA >60 >60 ml/min/1.73m2 GFR est non-AA >60 >60 ml/min/1.73m2 Calcium 9.0 8.5 - 10.1 MG/DL Bilirubin, total 0.5 0.2 - 1.0 MG/DL  
 ALT (SGPT) 30 12 - 78 U/L  
 AST (SGOT) 27 15 - 37 U/L Alk. phosphatase 80 45 - 117 U/L Protein, total 7.2 6.4 - 8.2 g/dL Albumin 4.0 3.5 - 5.0 g/dL Globulin 3.2 2.0 - 4.0 g/dL A-G Ratio 1.3 1.1 - 2.2 CK W/ REFLX CKMB Collection Time: 11/18/18  5:02 PM  
Result Value Ref Range  39 - 308 U/L  
TROPONIN I Collection Time: 11/18/18  5:02 PM  
Result Value Ref Range Troponin-I, Qt. 2.15 (H) <0.05 ng/mL Radiologic Studies - CXR Results  (Last 48 hours)  
          
 11/18/18 1731  XR CHEST PA LAT Final result Impression:  IMPRESSION:  
No acute process. Narrative:  INDICATION:   chest pain with left arm numbness EXAM:  PA and Lateral Chest Radiographs COMPARISON: None FINDINGS:  
   
PA and lateral views of the chest demonstrate a normal cardiomediastinal  
silhouette. The lungs are adequately expanded. There is no edema, effusion,  
consolidation, or pneumothorax. The osseous structures are unremarkable. Medical Decision Making I am the first provider for this patient. I reviewed the vital signs, available nursing notes, past medical history, past surgical history, family history and social history. Vital Signs-Reviewed the patient's vital signs. Patient Vitals for the past 12 hrs: 
 Temp Pulse Resp BP SpO2  
11/18/18 1700  88  137/86 96 % 11/18/18 1619 98.6 °F (37 °C) (!) 101 16 (!) 141/92 98 % Pulse Oximetry Analysis - 96% on 0L Cardiac Monitor:  
Rate: 88 bpm 
Rhythm: Normal Sinus Rhythm ED EKG interpretation: 16:23 Rhythm: normal sinus rhythm with sinus arrhythmia; and regular . Rate (approx.): 88; Axis: left axis deviation; P wave: normal; QRS interval: normal ; ST/T wave: nonspecific ST abnormality; Other findings: abnormal ekg. This EKG was interpreted by Justin Crawford M.D. Records Reviewed: Nursing Notes, Old Medical Records, Previous electrocardiograms, Previous Radiology Studies and Previous Laboratory Studies Provider Notes (Medical Decision Making): DDx: atypical chest pain, ACS, costochondritis, PNA, bronchitis, CHF, pleural effusion, MSK pain, GERD, pancreatitis Pt presents with acute chest pain; stable vitals and nontoxic appearing. The pt is unlikely to have PE. There is no pleuritic chest pain, no tachycardia, no hypoxia, pt non-smoker, no unilateral leg swelling, no recent travel/immobilization, no recent surgery. Therefore no d-dimer or PE The pt is unlikely to have aortic dissection. The pulses are equal, there is no sharp tearing chest pain radiating to back, the patient is not extremely hypertensive. Therefore no d-dimer or CTA chest for dissection Patient not extremely hypertensive, unlikely to be hypertensive emergency. No history of valve abnormality and no harsh murmur, no signs of flash pulmonary edema, therefore less likely ruptured valve. Endocarditis unlikely -- no IV drug abuse, native valve, no fevers, patient well appearing, no murmurs/splinter hemorrhages/janeway lesions or oslar nodes The pt is unlikely to have esophageal rupture. There is no history of forceful vomiting, patient well appearing, no difficulty swallowing Will pursue cardiac work-up with EKG, troponin, ckmb, CXR. While the pt is less likely to have pneumonia as there is no cough and no fever, and less likely to have ptx, will order CXR to assess lung fields. Will provide pain control and reassess. ED Course:  
Initial assessment performed. The patients presenting problems have been discussed, and they are in agreement with the care plan formulated and outlined with them. I have encouraged them to ask questions as they arise throughout their visit. Medications Administered during ED Management: 
Medications nitroglycerin (NITROSTAT) tablet 0.4 mg (not administered)  
metoprolol tartrate (LOPRESSOR) tablet 25 mg (25 mg Oral Given 18 2237)  
nitroglycerin (NITROBID) 2 % ointment 1 Inch (0 Inches Topical Held 18 0602) atorvastatin (LIPITOR) tablet 40 mg (40 mg Oral Given 18 1823) aspirin delayed-release tablet 81 mg (81 mg Oral Given 18 0943)  
sodium chloride (NS) flush 5-10 mL (0 mL IntraVENous Held 18 0600)  
sodium chloride (NS) flush 5-10 mL (not administered)  
acetaminophen (TYLENOL) solution 650 mg (not administered)  
zolpidem (AMBIEN) tablet 10 mg (not administered) 0.9% sodium chloride infusion (100 mL/hr IntraVENous New Bag 18 1305) mupirocin (BACTROBAN) 2 % ointment (not administered) ascorbic acid (vitamin C) (VITAMIN C) tablet 1,000 mg (not administered) aspirin chewable tablet 162 mg (162 mg Oral Given 18 1714) enoxaparin (LOVENOX) injection 90 mg (90 mg SubCUTAneous Given 18 182)  
heparinized saline 2 units/mL infusion 1,000 Units (1,000 Units Irrigation Given by Provider 18 1009)  
heparinized saline 2 units/mL infusion 1,000 Units (1,000 Units Irrigation Given by Provider 18 1011)  
heparinized saline 2 units/mL infusion 1,000 Units (1,000 Units Irrigation Given by Provider 18 1011)  
nitroglycerin 100 mcg/ml compounded injection (200 mcg IntraarTERial Given by Provider 18 1022) verapamil (ISOPTIN) 2.5 mg/mL injection 2.5 mg (2.5 mg IntraarTERial Given 18 1023)  
heparinized saline 2 units/mL infusion 1,000 Units (1,000 Units Irrigation Given by Provider 18 1012) I reviewed our electronic medical record system for any past medical records that were available that may contribute to the patients current condition, the nursing notes and vital signs from today's visit. Israel Loredo MD 
 
TOBACCO COUNSELIN:07 PM 
Upon evaluation, pt expressed that they are a current tobacco user.  For approximately 10 minutes, pt has been counseled on the dangers of smoking and was encouraged to quit as soon as possible in order to decrease further risks to their health. Pt has conveyed their understanding of the risks involved should they continue to use tobacco products. PROGRESS NOTE:  
5:50 PM 
Notified by RN that pt's troponin is over 2.0. Concern for NSTEMI. Pt given full dose ASA. Will give Levanox for anticoagulation. Consult Note: 
6:11 PM 
Sierra Walker MD spoke with Dr. Alvaro Mujica, Specialty: Hospitalist 
Discussed pt's hx, disposition, and available diagnostic and imaging results. Reviewed care plans. Consultant agrees with plans as outlined. Recommends giving Lovenox to pt. Pt is to be NPO after midnight. Will perform catherization tomorrow (11/19/18) PROGRESS NOTE:  
6:18 PM 
Pt has been re-examined by Sierra Walker MD. Discussed plan of care with pt and family who expressed their understanding. Consult Note: 
6:27 PM 
Sierra Walker MD spoke with Dr. Shirlene Wren, Specialty: Hospitalist 
Discussed pt's hx, disposition, and available diagnostic and imaging results. Reviewed care plans. Consultant agrees with plans as outlined. Will admit pt. CRITICAL CARE NOTE : 
 
7:00PM 
 
IMPENDING DETERIORATION -Airway, Respiratory, Cardiovascular, CNS and Metabolic ASSOCIATED RISK FACTORS - Hypotension, Shock, Hypoxia, Dysrhythmia and Metabolic changes MANAGEMENT- Bedside Assessment and Supervision of Care INTERPRETATION -  Xrays, Blood Gases, ECG, Blood Pressure and Cardiac Output Measures INTERVENTIONS - hemodynamic mngmt and management of NSTEMI, acute coronary syndrome requiring IV Lovenox, anticoagulation, frequent reassessments and monitoring CASE REVIEW - Hospitalist, Medical Sub-Specialist, Nursing and Family TREATMENT RESPONSE -Stable PERFORMED BY - Self NOTES   : 
 
I have spent 65 minutes of critical care time involved in lab review, consultations with specialist, family decision- making, bedside attention and documentation. During this entire length of time I was immediately available to the patient . Critical Care: The reason for providing this level of medical care for this critically ill patient was due to a critical illness that impaired one or more vital organ systems, such that there was a high probability of imminent or life threatening deterioration in the patient's condition. This care involved high complexity decision making to assess, manipulate, and support vital system functions, to treat this degree of vital organ system failure, and to prevent further life threatening deterioration of the patients condition. Shalini Walsh MD 
 
 
 
Admit Note: 
6:28 PM 
Pt is being admitted by Dr. Ivonne Jay. The results of their tests and reason(s) for their admission have been discussed with pt and/or available family. They convey agreement and understanding for the need to be admitted and for admission diagnosis. PLAN: 
1. Will admit pt to Hospitalist 
 
Diagnosis Clinical Impression: 1. NSTEMI (non-ST elevated myocardial infarction) ([address] 2. ACS (acute coronary syndrome) ([address] 3. Elevated troponin 4. Angina effort ([facility] [address] Attestations This note is prepared by [employer], acting as Scribe for MD Shalini Sewell MD : The scribe's documentation has been prepared under my direction and personally reviewed by me in its entirety. I confirm that the note above accurately reflects all work, treatment, procedures, and medical decision making performed by me. 
 
: 
 
This note will not be viewable in [address]

## 2018-11-19 PROBLEM — I25.10 CAD (CORONARY ARTERY DISEASE), NATIVE CORONARY ARTERY: Status: ACTIVE | Noted: 2018-11-19

## 2018-11-19 LAB
APPEARANCE UR: CLEAR
APTT PPP: 26.4 SEC (ref 22.1–32)
ARTERIAL PATENCY WRIST A: ABNORMAL
ATRIAL RATE: 88 BPM
BACTERIA URNS QL MICRO: NEGATIVE /HPF
BASE EXCESS BLDA CALC-SCNC: 1.3 MMOL/L
BDY SITE: ABNORMAL
BILIRUB UR QL: NEGATIVE
BNP SERPL-MCNC: 450 PG/ML (ref 0–125)
BNP SERPL-MCNC: 451 PG/ML (ref 0–125)
BREATHS.SPONTANEOUS ON VENT: 24
CALCULATED P AXIS, ECG09: 62 DEGREES
CALCULATED R AXIS, ECG10: -32 DEGREES
CALCULATED T AXIS, ECG11: 69 DEGREES
CHOLEST SERPL-MCNC: 162 MG/DL
COLOR UR: NORMAL
DIAGNOSIS, 93000: NORMAL
EPITH CASTS URNS QL MICRO: NORMAL /LPF
EST. AVERAGE GLUCOSE BLD GHB EST-MCNC: 120 MG/DL
GLUCOSE UR STRIP.AUTO-MCNC: NEGATIVE MG/DL
HBA1C MFR BLD: 5.8 % (ref 4.2–6.3)
HCO3 BLDA-SCNC: 25 MMOL/L (ref 22–26)
HDLC SERPL-MCNC: 31 MG/DL
HDLC SERPL: 5.2 {RATIO} (ref 0–5)
HGB UR QL STRIP: NEGATIVE
HYALINE CASTS URNS QL MICRO: NORMAL /LPF (ref 0–5)
INR PPP: 1 (ref 0.9–1.1)
KETONES UR QL STRIP.AUTO: NEGATIVE MG/DL
LDLC SERPL CALC-MCNC: 103.8 MG/DL (ref 0–100)
LEUKOCYTE ESTERASE UR QL STRIP.AUTO: NEGATIVE
LIPID PROFILE,FLP: ABNORMAL
NITRITE UR QL STRIP.AUTO: NEGATIVE
P-R INTERVAL, ECG05: 160 MS
PCO2 BLDA: 35 MMHG (ref 35–45)
PH BLDA: 7.47 [PH] (ref 7.35–7.45)
PH UR STRIP: 7.5 [PH] (ref 5–8)
PO2 BLDA: 59 MMHG (ref 80–100)
PROT UR STRIP-MCNC: NEGATIVE MG/DL
PROTHROMBIN TIME: 10.3 SEC (ref 9–11.1)
Q-T INTERVAL, ECG07: 376 MS
QRS DURATION, ECG06: 96 MS
QTC CALCULATION (BEZET), ECG08: 454 MS
RBC #/AREA URNS HPF: NORMAL /HPF (ref 0–5)
SAO2 % BLD: 92 % (ref 92–97)
SAO2% DEVICE SAO2% SENSOR NAME: ABNORMAL
SP GR UR REFRACTOMETRY: 1.01 (ref 1–1.03)
SPECIMEN SITE: ABNORMAL
THERAPEUTIC RANGE,PTTT: NORMAL SECS (ref 58–77)
TRIGL SERPL-MCNC: 136 MG/DL (ref ?–150)
TSH SERPL DL<=0.05 MIU/L-ACNC: 0.76 UIU/ML (ref 0.36–3.74)
UROBILINOGEN UR QL STRIP.AUTO: 0.2 EU/DL (ref 0.2–1)
VENTRICULAR RATE, ECG03: 88 BPM
VLDLC SERPL CALC-MCNC: 27.2 MG/DL
WBC URNS QL MICRO: NORMAL /HPF (ref 0–4)

## 2018-11-19 PROCEDURE — 74011250637 HC RX REV CODE- 250/637: Performed by: PHYSICIAN ASSISTANT

## 2018-11-19 PROCEDURE — 81001 URINALYSIS AUTO W/SCOPE: CPT

## 2018-11-19 PROCEDURE — 87086 URINE CULTURE/COLONY COUNT: CPT

## 2018-11-19 PROCEDURE — 80061 LIPID PANEL: CPT

## 2018-11-19 PROCEDURE — 85610 PROTHROMBIN TIME: CPT

## 2018-11-19 PROCEDURE — 77030019569 HC BND COMPR RAD TERU -B

## 2018-11-19 PROCEDURE — C1894 INTRO/SHEATH, NON-LASER: HCPCS

## 2018-11-19 PROCEDURE — 93880 EXTRACRANIAL BILAT STUDY: CPT

## 2018-11-19 PROCEDURE — 74011636637 HC RX REV CODE- 636/637: Performed by: THORACIC SURGERY (CARDIOTHORACIC VASCULAR SURGERY)

## 2018-11-19 PROCEDURE — 83036 HEMOGLOBIN GLYCOSYLATED A1C: CPT

## 2018-11-19 PROCEDURE — C1769 GUIDE WIRE: HCPCS

## 2018-11-19 PROCEDURE — 99152 MOD SED SAME PHYS/QHP 5/>YRS: CPT

## 2018-11-19 PROCEDURE — 93922 UPR/L XTREMITY ART 2 LEVELS: CPT

## 2018-11-19 PROCEDURE — 36415 COLL VENOUS BLD VENIPUNCTURE: CPT

## 2018-11-19 PROCEDURE — 84443 ASSAY THYROID STIM HORMONE: CPT

## 2018-11-19 PROCEDURE — 74011250637 HC RX REV CODE- 250/637: Performed by: INTERNAL MEDICINE

## 2018-11-19 PROCEDURE — 74011250636 HC RX REV CODE- 250/636

## 2018-11-19 PROCEDURE — 77030004549 HC CATH ANGI DX PRF MRTM -A

## 2018-11-19 PROCEDURE — B2111ZZ FLUOROSCOPY OF MULTIPLE CORONARY ARTERIES USING LOW OSMOLAR CONTRAST: ICD-10-PCS | Performed by: INTERNAL MEDICINE

## 2018-11-19 PROCEDURE — 86923 COMPATIBILITY TEST ELECTRIC: CPT

## 2018-11-19 PROCEDURE — 4A023N7 MEASUREMENT OF CARDIAC SAMPLING AND PRESSURE, LEFT HEART, PERCUTANEOUS APPROACH: ICD-10-PCS | Performed by: INTERNAL MEDICINE

## 2018-11-19 PROCEDURE — 74011000258 HC RX REV CODE- 258: Performed by: THORACIC SURGERY (CARDIOTHORACIC VASCULAR SURGERY)

## 2018-11-19 PROCEDURE — 77030019698 HC SYR ANGI MDLON MRTM -A

## 2018-11-19 PROCEDURE — 83880 ASSAY OF NATRIURETIC PEPTIDE: CPT

## 2018-11-19 PROCEDURE — 74011000250 HC RX REV CODE- 250: Performed by: INTERNAL MEDICINE

## 2018-11-19 PROCEDURE — 74011000250 HC RX REV CODE- 250: Performed by: THORACIC SURGERY (CARDIOTHORACIC VASCULAR SURGERY)

## 2018-11-19 PROCEDURE — 93306 TTE W/DOPPLER COMPLETE: CPT

## 2018-11-19 PROCEDURE — 77030015766

## 2018-11-19 PROCEDURE — 74011250636 HC RX REV CODE- 250/636: Performed by: INTERNAL MEDICINE

## 2018-11-19 PROCEDURE — 36600 WITHDRAWAL OF ARTERIAL BLOOD: CPT

## 2018-11-19 PROCEDURE — 85730 THROMBOPLASTIN TIME PARTIAL: CPT

## 2018-11-19 PROCEDURE — 77030008543 HC TBNG MON PRSS MRTM -A

## 2018-11-19 PROCEDURE — P9047 ALBUMIN (HUMAN), 25%, 50ML: HCPCS | Performed by: THORACIC SURGERY (CARDIOTHORACIC VASCULAR SURGERY)

## 2018-11-19 PROCEDURE — 65620000000 HC RM CCU GENERAL

## 2018-11-19 PROCEDURE — B2151ZZ FLUOROSCOPY OF LEFT HEART USING LOW OSMOLAR CONTRAST: ICD-10-PCS | Performed by: INTERNAL MEDICINE

## 2018-11-19 PROCEDURE — 77030028837 HC SYR ANGI PWR INJ COEU -A

## 2018-11-19 PROCEDURE — 82803 BLOOD GASES ANY COMBINATION: CPT

## 2018-11-19 PROCEDURE — 77030010221 HC SPLNT WR POS TELE -B

## 2018-11-19 PROCEDURE — 86901 BLOOD TYPING SEROLOGIC RH(D): CPT

## 2018-11-19 PROCEDURE — 74011250636 HC RX REV CODE- 250/636: Performed by: THORACIC SURGERY (CARDIOTHORACIC VASCULAR SURGERY)

## 2018-11-19 PROCEDURE — 94060 EVALUATION OF WHEEZING: CPT

## 2018-11-19 PROCEDURE — 74011636320 HC RX REV CODE- 636/320

## 2018-11-19 PROCEDURE — 74011000250 HC RX REV CODE- 250

## 2018-11-19 RX ORDER — HEPARIN SODIUM 1000 [USP'U]/ML
INJECTION, SOLUTION INTRAVENOUS; SUBCUTANEOUS
Status: COMPLETED
Start: 2018-11-19 | End: 2018-11-19

## 2018-11-19 RX ORDER — DOBUTAMINE HYDROCHLORIDE 200 MG/100ML
0-10 INJECTION INTRAVENOUS
Status: DISCONTINUED | OUTPATIENT
Start: 2018-11-20 | End: 2018-11-20

## 2018-11-19 RX ORDER — POTASSIUM CHLORIDE 29.8 MG/ML
20 INJECTION INTRAVENOUS ONCE
Status: DISCONTINUED | OUTPATIENT
Start: 2018-11-20 | End: 2018-11-20

## 2018-11-19 RX ORDER — DOPAMINE HYDROCHLORIDE 320 MG/100ML
5-20 INJECTION, SOLUTION INTRAVENOUS
Status: DISCONTINUED | OUTPATIENT
Start: 2018-11-20 | End: 2018-11-20

## 2018-11-19 RX ORDER — ALBUTEROL SULFATE 0.83 MG/ML
2.5 SOLUTION RESPIRATORY (INHALATION)
Status: COMPLETED | OUTPATIENT
Start: 2018-11-19 | End: 2018-11-19

## 2018-11-19 RX ORDER — PROTAMINE SULFATE 10 MG/ML
250 INJECTION, SOLUTION INTRAVENOUS
Status: DISCONTINUED | OUTPATIENT
Start: 2018-11-20 | End: 2018-11-20

## 2018-11-19 RX ORDER — SODIUM CHLORIDE 0.9 % (FLUSH) 0.9 %
5-10 SYRINGE (ML) INJECTION AS NEEDED
Status: DISCONTINUED | OUTPATIENT
Start: 2018-11-19 | End: 2018-11-20

## 2018-11-19 RX ORDER — HEPARIN SODIUM 200 [USP'U]/100ML
500 INJECTION, SOLUTION INTRAVENOUS ONCE
Status: COMPLETED | OUTPATIENT
Start: 2018-11-19 | End: 2018-11-19

## 2018-11-19 RX ORDER — FENTANYL CITRATE 50 UG/ML
25-50 INJECTION, SOLUTION INTRAMUSCULAR; INTRAVENOUS
Status: CANCELLED | OUTPATIENT
Start: 2018-11-19

## 2018-11-19 RX ORDER — MAGNESIUM SULFATE HEPTAHYDRATE 40 MG/ML
2 INJECTION, SOLUTION INTRAVENOUS ONCE
Status: DISCONTINUED | OUTPATIENT
Start: 2018-11-20 | End: 2018-11-20

## 2018-11-19 RX ORDER — CEFAZOLIN SODIUM/WATER 2 G/20 ML
2 SYRINGE (ML) INTRAVENOUS
Status: COMPLETED | OUTPATIENT
Start: 2018-11-20 | End: 2018-11-20

## 2018-11-19 RX ORDER — ASCORBIC ACID 500 MG
1000 TABLET ORAL DAILY
Status: DISCONTINUED | OUTPATIENT
Start: 2018-11-19 | End: 2018-11-25 | Stop reason: HOSPADM

## 2018-11-19 RX ORDER — MIDAZOLAM HYDROCHLORIDE 1 MG/ML
INJECTION, SOLUTION INTRAMUSCULAR; INTRAVENOUS
Status: COMPLETED
Start: 2018-11-19 | End: 2018-11-19

## 2018-11-19 RX ORDER — ALBUTEROL SULFATE 0.83 MG/ML
SOLUTION RESPIRATORY (INHALATION)
Status: DISPENSED
Start: 2018-11-19 | End: 2018-11-20

## 2018-11-19 RX ORDER — LIDOCAINE HYDROCHLORIDE 10 MG/ML
1-30 INJECTION, SOLUTION EPIDURAL; INFILTRATION; INTRACAUDAL; PERINEURAL
Status: DISCONTINUED | OUTPATIENT
Start: 2018-11-19 | End: 2018-11-19

## 2018-11-19 RX ORDER — MIDAZOLAM HYDROCHLORIDE 1 MG/ML
.5-2 INJECTION, SOLUTION INTRAMUSCULAR; INTRAVENOUS
Status: DISCONTINUED | OUTPATIENT
Start: 2018-11-19 | End: 2018-11-19

## 2018-11-19 RX ORDER — HEPARIN SODIUM 200 [USP'U]/100ML
500 INJECTION, SOLUTION INTRAVENOUS ONCE
Status: CANCELLED | OUTPATIENT
Start: 2018-11-19 | End: 2018-11-19

## 2018-11-19 RX ORDER — AMIODARONE HYDROCHLORIDE 200 MG/1
400 TABLET ORAL EVERY 12 HOURS
Status: DISCONTINUED | OUTPATIENT
Start: 2018-11-19 | End: 2018-11-21

## 2018-11-19 RX ORDER — HEPARIN SODIUM 200 [USP'U]/100ML
500 INJECTION, SOLUTION INTRAVENOUS ONCE
Status: DISCONTINUED | OUTPATIENT
Start: 2018-11-19 | End: 2018-11-19

## 2018-11-19 RX ORDER — HEPARIN SODIUM 10000 [USP'U]/100ML
12-25 INJECTION, SOLUTION INTRAVENOUS
Status: CANCELLED | OUTPATIENT
Start: 2018-11-19

## 2018-11-19 RX ORDER — ZOLPIDEM TARTRATE 5 MG/1
5 TABLET ORAL
Status: DISCONTINUED | OUTPATIENT
Start: 2018-11-19 | End: 2018-11-20

## 2018-11-19 RX ORDER — FENTANYL CITRATE 50 UG/ML
25-50 INJECTION, SOLUTION INTRAMUSCULAR; INTRAVENOUS
Status: DISCONTINUED | OUTPATIENT
Start: 2018-11-19 | End: 2018-11-19

## 2018-11-19 RX ORDER — NITROGLYCERIN 20 MG/100ML
0-200 INJECTION INTRAVENOUS
Status: DISCONTINUED | OUTPATIENT
Start: 2018-11-20 | End: 2018-11-20

## 2018-11-19 RX ORDER — VERAPAMIL HYDROCHLORIDE 2.5 MG/ML
2.5 INJECTION, SOLUTION INTRAVENOUS ONCE
Status: COMPLETED | OUTPATIENT
Start: 2018-11-19 | End: 2018-11-19

## 2018-11-19 RX ORDER — DESMOPRESSIN ACETATE 4 UG/ML
2 INJECTION, SOLUTION INTRAVENOUS; SUBCUTANEOUS ONCE
Status: DISCONTINUED | OUTPATIENT
Start: 2018-11-20 | End: 2018-11-20

## 2018-11-19 RX ORDER — MIDAZOLAM HYDROCHLORIDE 1 MG/ML
.5-2 INJECTION, SOLUTION INTRAMUSCULAR; INTRAVENOUS
Status: CANCELLED | OUTPATIENT
Start: 2018-11-19

## 2018-11-19 RX ORDER — ASCORBIC ACID 500 MG
1000 TABLET ORAL DAILY
Status: DISCONTINUED | OUTPATIENT
Start: 2018-11-20 | End: 2018-11-19

## 2018-11-19 RX ORDER — HEPARIN SODIUM 200 [USP'U]/100ML
INJECTION, SOLUTION INTRAVENOUS
Status: COMPLETED
Start: 2018-11-19 | End: 2018-11-19

## 2018-11-19 RX ORDER — SODIUM CHLORIDE 0.9 % (FLUSH) 0.9 %
5-10 SYRINGE (ML) INJECTION EVERY 8 HOURS
Status: DISCONTINUED | OUTPATIENT
Start: 2018-11-19 | End: 2018-11-20

## 2018-11-19 RX ORDER — VERAPAMIL HYDROCHLORIDE 2.5 MG/ML
INJECTION, SOLUTION INTRAVENOUS
Status: COMPLETED
Start: 2018-11-19 | End: 2018-11-19

## 2018-11-19 RX ORDER — MUPIROCIN 20 MG/G
1 OINTMENT TOPICAL 2 TIMES DAILY
Status: DISCONTINUED | OUTPATIENT
Start: 2018-11-19 | End: 2018-11-19

## 2018-11-19 RX ORDER — CHLORHEXIDINE GLUCONATE 1.2 MG/ML
15 RINSE ORAL EVERY 12 HOURS
Status: DISCONTINUED | OUTPATIENT
Start: 2018-11-19 | End: 2018-11-25 | Stop reason: HOSPADM

## 2018-11-19 RX ORDER — LIDOCAINE HYDROCHLORIDE 10 MG/ML
INJECTION, SOLUTION EPIDURAL; INFILTRATION; INTRACAUDAL; PERINEURAL
Status: COMPLETED
Start: 2018-11-19 | End: 2018-11-19

## 2018-11-19 RX ORDER — VERAPAMIL HYDROCHLORIDE 2.5 MG/ML
2.5 INJECTION, SOLUTION INTRAVENOUS ONCE
Status: CANCELLED | OUTPATIENT
Start: 2018-11-19 | End: 2018-11-19

## 2018-11-19 RX ORDER — MUPIROCIN 20 MG/G
OINTMENT TOPICAL EVERY 12 HOURS
Status: DISCONTINUED | OUTPATIENT
Start: 2018-11-19 | End: 2018-11-20

## 2018-11-19 RX ORDER — FENTANYL CITRATE 50 UG/ML
INJECTION, SOLUTION INTRAMUSCULAR; INTRAVENOUS
Status: COMPLETED
Start: 2018-11-19 | End: 2018-11-19

## 2018-11-19 RX ORDER — HEPARIN SODIUM 1000 [USP'U]/ML
1000-10000 INJECTION, SOLUTION INTRAVENOUS; SUBCUTANEOUS
Status: DISCONTINUED | OUTPATIENT
Start: 2018-11-19 | End: 2018-11-19

## 2018-11-19 RX ORDER — LIDOCAINE HYDROCHLORIDE 10 MG/ML
1-30 INJECTION, SOLUTION EPIDURAL; INFILTRATION; INTRACAUDAL; PERINEURAL
Status: CANCELLED | OUTPATIENT
Start: 2018-11-19

## 2018-11-19 RX ORDER — HEPARIN SODIUM 1000 [USP'U]/ML
2500 INJECTION, SOLUTION INTRAVENOUS; SUBCUTANEOUS ONCE
Status: CANCELLED | OUTPATIENT
Start: 2018-11-19 | End: 2018-11-19

## 2018-11-19 RX ADMIN — HEPARIN SODIUM IN SODIUM CHLORIDE 1000 UNITS: 200 INJECTION INTRAVENOUS at 10:11

## 2018-11-19 RX ADMIN — Medication 10 ML: at 21:39

## 2018-11-19 RX ADMIN — FENTANYL CITRATE 50 MCG: 50 INJECTION, SOLUTION INTRAMUSCULAR; INTRAVENOUS at 09:58

## 2018-11-19 RX ADMIN — ALBUTEROL SULFATE 2.5 MG: 2.5 SOLUTION RESPIRATORY (INHALATION) at 17:07

## 2018-11-19 RX ADMIN — ASPIRIN 81 MG: 81 TABLET, COATED ORAL at 09:43

## 2018-11-19 RX ADMIN — MIDAZOLAM HYDROCHLORIDE 2 MG: 1 INJECTION, SOLUTION INTRAMUSCULAR; INTRAVENOUS at 09:58

## 2018-11-19 RX ADMIN — NITROGLYCERIN 1 INCH: 20 OINTMENT TOPICAL at 19:45

## 2018-11-19 RX ADMIN — LIDOCAINE HYDROCHLORIDE 2 ML: 10 INJECTION, SOLUTION EPIDURAL; INFILTRATION; INTRACAUDAL; PERINEURAL at 10:18

## 2018-11-19 RX ADMIN — AMIODARONE HYDROCHLORIDE 400 MG: 200 TABLET ORAL at 21:39

## 2018-11-19 RX ADMIN — HEPARIN SODIUM 2500 UNITS: 1000 INJECTION INTRAVENOUS; SUBCUTANEOUS at 10:22

## 2018-11-19 RX ADMIN — HEPARIN SODIUM IN SODIUM CHLORIDE 1000 UNITS: 200 INJECTION INTRAVENOUS at 10:09

## 2018-11-19 RX ADMIN — IOPAMIDOL 24 ML: 755 INJECTION, SOLUTION INTRAVENOUS at 10:33

## 2018-11-19 RX ADMIN — VERAPAMIL HYDROCHLORIDE 2.5 MG: 2.5 INJECTION, SOLUTION INTRAVENOUS at 10:23

## 2018-11-19 RX ADMIN — MIDAZOLAM HYDROCHLORIDE 1 MG: 1 INJECTION, SOLUTION INTRAMUSCULAR; INTRAVENOUS at 10:21

## 2018-11-19 RX ADMIN — NITROGLYCERIN 200 MCG: 5 INJECTION, SOLUTION INTRAVENOUS at 10:22

## 2018-11-19 RX ADMIN — SODIUM CHLORIDE 100 ML/HR: 900 INJECTION, SOLUTION INTRAVENOUS at 13:05

## 2018-11-19 RX ADMIN — IOPAMIDOL 75 ML: 755 INJECTION, SOLUTION INTRAVENOUS at 10:35

## 2018-11-19 RX ADMIN — MUPIROCIN: 20 OINTMENT TOPICAL at 21:38

## 2018-11-19 RX ADMIN — SODIUM CHLORIDE 100 ML/HR: 900 INJECTION, SOLUTION INTRAVENOUS at 21:20

## 2018-11-19 RX ADMIN — HEPARIN SODIUM 2500 UNITS: 1000 INJECTION, SOLUTION INTRAVENOUS; SUBCUTANEOUS at 10:22

## 2018-11-19 RX ADMIN — METOPROLOL TARTRATE 25 MG: 25 TABLET ORAL at 21:39

## 2018-11-19 RX ADMIN — HEPARIN SODIUM IN SODIUM CHLORIDE 1000 UNITS: 200 INJECTION INTRAVENOUS at 10:12

## 2018-11-19 RX ADMIN — HEPARIN SODIUM 1000 UNITS: 200 INJECTION, SOLUTION INTRAVENOUS at 10:09

## 2018-11-19 RX ADMIN — VERAPAMIL HYDROCHLORIDE 2.5 MG: 2.5 INJECTION INTRAVENOUS at 10:23

## 2018-11-19 NOTE — PROGRESS NOTES
[facility] Consult Service Progress NOTEPulmonary, Critical Care, and Sleep Medicine Name: Michelle Sandoval MRN: [id number] : 1965 Hospital: [facility] Date: 2018  Admission Date: 2018 Hospital Day: 2 Chart and notes reviewed. Data reviewed. I have evaluated and examined the patient. Pt now in CCU following cardiac cath Excerpts from admission and consult notes reviewed as follows: Vanessa Mims is a 46 y.o. male with no significant PMHx, presents himself w/ family to the ED with cc of intermittent, sharp mid CP (18). Pt reports associated left arm numbness since onset of pain. He states that initial episode of CP came onto him while he was weeding approximately 6hrs ago today. Pt states that the episode of CP lasted for about 5 minutes until it resolved. He reports of a second episode of CP at around 3:00 PM of which he took a baby ASA. Pt notes no significant improvement of pain with taking the baby ASA. He endorses exacerbation of pain with exertion. EKG revealed no acute ischemic changes. Initial troponin was positive at 2. He is currently pain-free. In the ER, he was given aspirin, beta-blocker, Lovenox, nitroglycerin paste. \" 
 
Pt taken to cath lab by Dr. Tony Richardson and found to have tight LM and LVEF 40%. Seen by Archana Sterling and will need 2 V CABG. Pt has also had chest pain while pushing wife's wheelchair. Smokes. Non diabetic. Pt manages golf range, course. Seasonal allergies. Occasional wheeze, yellow sputum. No hemoptysis. Does not take flushots IMPRESSION:  
1. CAD - severe tight LM 2. Smoker since teen 1 PPD x 35+ yrs 3. Additional workup outlined below 4. Multiorgan dysfunction as outlined above: Pt has one or more acute or chronic illnesses with severe exacerbation with progression or side effects of treatment that poses a threat to life or bodily function 5. Pt is requiring Drug therapy requiring intensive monitoring for toxicity RECOMMENDATIONS/PLAN:  
1. CCU monitoring 2. Incentive Spirometry? 3. Sputum culture if availble 4. Monitor bronchial secretions 5. Bronchial hygiene therapy 6. Bronchodilators prn 7. Smoking cessation counseling done- pt will followup for more discussions after he recovers from CABG 8. PT, OT when able 9. Pt needs IV fluids with additives and Drug therapy requiring intensive monitoring for toxicity 10. DVT, SUP prophylaxiss 11. Glucose monitoring and insulin management for tight glycemic control 12. Prescription drug management with home med reconciliation done [x] High complexity decision making was performed [x] See my orders for details Social History Tobacco Use  Smoking status: Not on file Substance Use Topics  Alcohol use: Not on file No family history on file. No Known Allergies MAR reviewed and pertinent medications noted or modified as needed Current Facility-Administered Medications Medication  mupirocin (BACTROBAN) 2 % ointment  sodium chloride (NS) flush 5-10 mL  sodium chloride (NS) flush 5-10 mL  sodium phosphate (FLEET'S) enema 1 Enema  amiodarone (CORDARONE) tablet 400 mg  [START ON 11/20/2018] ceFAZolin (ANCEF) 2 g/20 mL in sterile water IV syringe  zolpidem (AMBIEN) tablet 5 mg  ascorbic acid (vitamin C) (VITAMIN C) tablet 1,000 mg  chlorhexidine (PERIDEX) 0.12 % mouthwash 15 mL  nitroglycerin (NITROSTAT) tablet 0.4 mg  
 metoprolol tartrate (LOPRESSOR) tablet 25 mg  
 nitroglycerin (NITROBID) 2 % ointment 1 Inch  atorvastatin (LIPITOR) tablet 40 mg  
 aspirin delayed-release tablet 81 mg  
 sodium chloride (NS) flush 5-10 mL  sodium chloride (NS) flush 5-10 mL  acetaminophen (TYLENOL) solution 650 mg  
 zolpidem (AMBIEN) tablet 10 mg  
 0.9% sodium chloride infusion Vital Signs: Intake/Output: Intake/Output: Visit Vitals /69 Pulse 85 Temp 98 °F (36.7 °C) Resp 16 Ht 6' (1.829 m) Wt 85.2 kg (187 lb 13.3 oz) SpO2 93% BMI 25.47 kg/m² Temp (24hrs), Av.4 °F (36.9 °C), Min:98 °F (36.7 °C), Max:99 °F (37.2 °C) Telemetry:normal sinus rhythm; O2 Device: Room air Wt Readings from Last 4 Encounters:  
18 85.2 kg (187 lb 13.3 oz)  
10/02/18 85.8 kg (189 lb 2.5 oz) Intake/Output Summary (Last 24 hours) at 2018 1552 Last data filed at 2018 1305 Gross per 24 hour Intake  Output 200 ml Net -200 ml Last shift:       0701 -  1900 In: -  
Out: 200 [Urine:200] Last 3 shifts: No intake/output data recorded. Physical Exam:  
 General: in no respiratory distress and acyanotic, alert and oriented times 3;   male HEAD: Normocephalic, without obvious abnormality, atraumatic EYES: conjunctivae clear. PERRL,  AN Icteric sclerae NOSE: nares normal, no drainage, no flaring, THROAT: normal; Lips, mucosa dry;  Oral hygiene; No Thrush;  
 Neck: Supple, symmetrical, trachea midline,  No accessory mm use; No Stridor/ cuff leak, No goiter or thyroid tenderness LYMPH: No abnormally enlarged lymph nodes. in neck or groin Chest: normal; mediastinal tubes in place; pacemaker wires Lungs: normal air entry Heart: Regular rate and rhythm ; NO edema Abdomen: soft, non-tender, without masses or organomegaly : NO  Almanzar, Extremity: negative, cyanosis, clubbing, Neuro: alert; verbal; withdraws to pain; unable to check gait and station; does follow simple commands Psych: oriented to time, place and person; No agitation; normal affect Skin: Skin unremarkable; ;  
 Pulses:Bilateral, Radial, 2+ Capillary refill: normal; warm, well perfused, 
 
Tubes: DATA: 
 
Labs: 
 
Recent Labs 18 
1702 WBC 12.4* HGB 15.1  Recent Labs 18 
1702   
K 3.5  CO2 30 * BUN 13  
 CREA 1.09  
CA 9.0 ALB 4.0  
SGOT 27 ALT 30 No results for input(s): PH, PCO2, PO2, HCO3, FIO2 in the last 72 hours. Recent Labs 11/18/18 
1702 TROIQ 2.15* No results found for: BNPP, BNP No results found for: CULT No results found for: TSH, TSHEXT Imaging:  
  
Results from Hospital Encounter encounter on 11/18/18 XR CHEST PA LAT Narrative INDICATION:   chest pain with left arm numbness EXAM:  PA and Lateral Chest Radiographs COMPARISON: None FINDINGS: 
 
PA and lateral views of the chest demonstrate a normal cardiomediastinal 
silhouette. The lungs are adequately expanded. There is no edema, effusion, 
consolidation, or pneumothorax. The osseous structures are unremarkable. Impression IMPRESSION: 
No acute process. Results from Hospital Encounter encounter on 10/02/18 CT ABD PELV W CONT Narrative EXAM: CT ABDOMEN PELVIS WITH CONTRAST INDICATION: Right lower quadrant pain. . 
COMPARISON: None. CONTRAST: 100 mL of Isovue-370. TECHNIQUE:  
Multislice helical CT was performed from the diaphragm to the symphysis pubis 
during uneventful rapid bolus intravenous contrast administration. Oral contrast 
was not administered. Contiguous 5 mm axial images were reconstructed and lung 
and soft tissue windows were generated. Coronal and sagittal reformations were 
generated. CT dose reduction was achieved through use of a standardized protocol 
tailored for this examination and automatic exposure control for dose 
modulation. FINDINGS: 
LOWER CHEST: The visualized portions of the lung bases are clear. ABDOMEN: 
Liver: The liver is normal in size and contour with no focal abnormality. Gallbladder and bile ducts: There is no calcified gallstone or biliary 
dilatation. Spleen: No abnormality. Pancreas: No abnormality. Adrenal glands: No abnormality. Kidneys: The right kidney is enlarged with perinephric stranding and there is hydronephrosis and hydroureter down to the level of the 2 mm stone at the right 
ureterovesical junction. There is also perirenal stranding. There is no stone or 
other abnormality on the left. PELVIS: 
Reproductive organs: The prostate gland and seminal vesicles are symmetrical.  
Bladder: No abnormality. BOWEL AND MESENTERY: The small bowel is normal.  There is no mesenteric mass or 
adenopathy. The appendix is normal. 
PERITONEUM: There is no ascites or free intraperitoneal air. RETROPERITONEUM: The aorta  tapers without aneurysm. There is no retroperitoneal 
adenopathy or mass. There is no pelvic mass or adenopathy. BONES AND SOFT TISSUES: The bones and soft tissues of the abdominal wall are 
within normal limits. Impression IMPRESSION: 2 mm right ureterovesical junction calculus with obstruction, 
hydronephrosis, hydroureter and perinephric and periureteral stranding. Nik Ferris This care involved high complexity medical decision making: I personally: · Reviewed the flowsheet and previous days notes · Reviewed and summarized records or history from previous days note or discussions with staff, family · Parenteral controlled substances - Reviewed/ Adjusted / Weaned / Started · High Risk Drug therapy requiring intensive monitoring for toxicity: eg steroids, pressors, antibiotics · Reviewed and/or ordered Clinical lab tests · Reviewed and/or ordered Radiology tests · Reviewed and/or ordered of Medicine tests · Independently visualized radiologic Images · Reviewed the patients ECG / Telemetry My assessment/management discussed with: Cardiothoracic surgery, Nursing, Pharmacy, Case Management, PT, OT, Respiratory Therapy, Nutrition, Diabetes specialist and Family for coordination of care Pt's condition is acute and unstable requiring inpatient hospitalization.  This care involved high complexity decision making in order to assess, support vital system function, and to treat this degree of vital organ system failure, and to prevent further deterioration of the patients condition.   
 
Gertrude Alvarez MD

## 2018-11-19 NOTE — ROUTINE PROCESS
TRANSFER - OUT REPORT: 
 
Verbal report given to Yasmin Shaw RN (name) on Maggie Walker  being transferred to CCU (unit) for urgent transfer Report consisted of patients Situation, Background, Assessment and  
Recommendations(SBAR). Information from the following report(s) ED Summary and Procedure Summary was reviewed with the receiving nurse. Lines:  
Peripheral IV 11/18/18 Right Antecubital (Active) Site Assessment Clean, dry, & intact 11/19/2018 10:13 AM  
Phlebitis Assessment 0 11/19/2018 10:13 AM  
Infiltration Assessment 0 11/19/2018 10:13 AM  
Dressing Status Clean, dry, & intact 11/19/2018 10:13 AM  
Dressing Type Transparent 11/19/2018 10:13 AM  
Hub Color/Line Status Pink 11/19/2018 10:13 AM  
  
 
Opportunity for questions and clarification was provided. Patient transported with: 
 Monitor Registered Nurse

## 2018-11-19 NOTE — PROGRESS NOTES
1750  Patient received from room 2510 via pulmonary function lab. Assisted to toilet to void, then to bed. Connected to monitors. Denies complaints. Vitals stable. On room air with sats 94%. [address] [facility],[address], [facility]. Wife at bedside. 1900  Bedside and Verbal shift change report given to Nic Quiros RN (oncoming nurse) by Teddy Gunter RN (offgoing nurse). Report included the following information SBAR, Kardex, Procedure Summary, Intake/Output, MAR, Accordion, Recent Results, Med Rec Status, Cardiac Rhythm NSR-ST and Alarm Parameters .

## 2018-11-19 NOTE — PROCEDURES
[facility]  *** FINAL REPORT ***    Name: Aysha Merino  MRN: [id number]    Inpatient  : 1965  HIS Order #: [id number]  [address] [facility] Visit #: [id number]  Date: 2018    TYPE OF TEST: Cerebrovascular Duplex    REASON FOR TEST  Carotid bruit    Right Carotid:-             Proximal               Mid                 Distal  cm/s  Systolic  Diastolic  Systolic  Diastolic  Systolic  Diastolic  CCA:     91.9      22.0                            68.0      22.0  Bulb:  ECA:     62.0       4.0  ICA:     50.0      16.0       83.0      33.0       75.0      36.0  ICA/CCA:  0.7       0.7    ICA Stenosis: <50%    Right Vertebral:-  Finding: Antegrade  Sys:       54.0  Lavonne:       23.0    Right Subclavian: Normal    Left Carotid:-            Proximal                Mid                 Distal  cm/s  Systolic  Diastolic  Systolic  Diastolic  Systolic  Diastolic  CCA:     36.6      30.0                            65.0      20.0  Bulb:  ECA:     58.0       7.0  ICA:     70.0      23.0       64.0      30.0       63.0      27.0  ICA/CCA:  1.1       1.2    ICA Stenosis: Normal    Left Vertebral:-  Finding: Antegrade  Sys:       43.0  Lavonne:       16.0    Left Subclavian: Normal    INTERPRETATION/FINDINGS  PROCEDURE:  Carotid Duplex Examination using B-mode, color and  spectral Doppler of the extracranial cerebrovascular arteries. 1. <50% stenosis in the right internal carotid artery. 2. No significant stenosis of the left internal carotid artery. 3. No significant stenosis in the external carotid arteries  bilaterally. 4. Antegrade flow in both vertebral arteries. 5. Normal flow in both subclavian arteries. Plaque Morphology:  1. Heterogeneous plaque in the right ICA. ADDITIONAL COMMENTS    I have personally reviewed the data relevant to the interpretation of  this  study. TECHNOLOGIST: Leanna Chi.  YUNG Giron, RDMS  Signed: 2018 02:55 PM    PHYSICIAN: Corrinne Rain, MD  Signed: 2018 10:01 AM

## 2018-11-19 NOTE — PROGRESS NOTES
As per Dr. Yaima Fajardo no need for Hospitalist consult, Cardiology will admit the patient Dr. [facility]

## 2018-11-19 NOTE — ED NOTES
Bedside and Verbal shift change report given to [address] (oncoming nurse) by Shawn Swain (offgoing nurse). Report included the following information SBAR, ED Summary, MAR, Recent Results and Cardiac Rhythm NSR.

## 2018-11-19 NOTE — CARDIO/PULMONARY
CP Rehab Note: chart review Admit: NSTEMI, CABG surgery scheduled for 11/20/18 Current smoker. Added to discharge instructions: 
Smoking Cessation Program:  
This is a free, 
phone/text/email based, smoking cessation program. The program is individualized to meet each patient's needs. To enroll use this link [email or web address] Cardiac Rehab:  CABG educational folder to the bedside of Tomasz Galarza. Educated using teach back method. Assessed patient's understanding of diagnosis and upcoming surgery. Reviewed general pre-op instructions including the need for thermometer and scale post-op, use of incentive spirometer, understanding of pain scale, and answered general post-surgery questions. Discussed risk factors for CAD to include the following: family history, elevated BMI, hyperlipidemia, hypertension, diabetes, stress, and smoking. Encouraged patient's consideration of participation in a Cardiac Rehab Program, after discharge, to assist with their risk modification and management. Tomasz Galarza verbalized understanding with questions answered. All questions pertaining to actual surgery were deferred to surgical team. 
 
Will continue to follow.  Baylee Becker RN

## 2018-11-19 NOTE — ED NOTES
Pending admission orders at this time. Denies chest pain or shortness of breath at this time. Call light next to patient.

## 2018-11-19 NOTE — ED NOTES
TRANSFER - OUT REPORT: 
 
Verbal report given to STAR LIPSCOMB [facility] RN(name) on Arlyn Cunningham  being transferred to [facility] (unit) for routine progression of care Report consisted of patients Situation, Background, Assessment and  
Recommendations(SBAR). Information from the following report(s) SBAR, Kardex, ED Summary, MAR, Med Rec Status and Cardiac Rhythm NSR was reviewed with the receiving nurse. Lines:  
Peripheral IV 11/18/18 Right Antecubital (Active) Site Assessment Clean, dry, & intact 11/18/2018  5:06 PM  
  
 
Opportunity for questions and clarification was provided. Patient transported with: 
 Monitor Registered Nurse

## 2018-11-19 NOTE — PROGRESS NOTES
1115 patient received from cath lab patent at 12 no bleeding or hematoma from right radial site 1400 TR BANd completely off no bleeding ot hematoma. 1630 Patient transferred to  Right arm /73 , Left ARM /80 Bedside shift change report given to [facility]  (oncoming nurse) by Fabby Aragon  (offgoing nurse). Report included the following information SBAR, Kardex, ED Summary, OR Summary, Procedure Summary and Intake/Output.

## 2018-11-19 NOTE — H&P
CARDIOLOGY CONSULT Date of  Admission: 11/18/2018  4:40 PM  
 
Admission type:Emergency Consult for:  NSTEMI Consulted by:  Dr. Guera Maddox Subjective:  
 
Shelby Ballard is a 46 y.o. male with no significant PMHx, presents himself w/ family to the ED with cc of intermittent, sharp mid CP since today (11/18/18). Pt reports associated left arm numbness since onset of pain. He states that initial episode of CP came onto him while he was weeding approximately 6hrs ago today. Pt states that the episode of CP lasted for about 5 minutes until it resolved. He reports of a second episode of CP at around 3:00 PM of which he took a baby ASA. Pt notes no significant improvement of pain with taking the baby ASA. He endorses exacerbation of pain with exertion. EKG revealed no acute ischemic changes. Initial troponin was positive at 2. He is currently pain-free. In the ER, he was given aspirin, beta-blocker, Lovenox, nitroglycerin paste. Patient Active Problem List  
 Diagnosis Date Noted  NSTEMI (non-ST elevation myocardial infarction) ([facility] [address] 11/18/2018  Mixed hyperlipidemia 11/18/2018  Tobacco use disorder 11/18/2018 Autumn Weldon MD 
No past medical history on file. Social History Socioeconomic History  Marital status:  Spouse name: Not on file  Number of children: Not on file  Years of education: Not on file  Highest education level: Not on file Social Needs  Financial resource strain: Not on file  Food insecurity - worry: Not on file  Food insecurity - inability: Not on file  Transportation needs - medical: Not on file  Transportation needs - non-medical: Not on file Occupational History  Not on file Tobacco Use  Smoking status: Not on file Substance and Sexual Activity  Alcohol use: Not on file  Drug use: Not on file  Sexual activity: Not on file Other Topics Concern  Not on file Social History Narrative  Not on file No Known Allergies No family history on file. Current Facility-Administered Medications Medication Dose Route Frequency  nitroglycerin (NITROSTAT) tablet 0.4 mg  0.4 mg SubLINGual Q5MIN PRN  
 metoprolol tartrate (LOPRESSOR) tablet 25 mg  25 mg Oral Q12H  
 nitroglycerin (NITROBID) 2 % ointment 1 Inch  1 Inch Topical Q6H  
 atorvastatin (LIPITOR) tablet 40 mg  40 mg Oral DAILY  [START ON 11/19/2018] aspirin delayed-release tablet 81 mg  81 mg Oral DAILY  [START ON 11/19/2018] 0.9% sodium chloride infusion  100 mL/hr IntraVENous CONTINUOUS No current outpatient medications on file. Review of Symptoms: 
11 systems reviewed, negative other than as stated in HPI Subjective:  
 
  
Visit Vitals /84 Pulse 90 Temp 98.6 °F (37 °C) Resp 16 Ht 6' (1.829 m) Wt 187 lb 13.3 oz (85.2 kg) SpO2 96% BMI 25.47 kg/m² Physical: 
Gen:  NAD Heart: regular rhythm, no murmur, gallop or rub Lungs: clear to auscultation bilaterally Neck: supple, symmetrical, trachea midline, no adenopathy, thyroid: not enlarged, symmetric, no tenderness/mass/nodules, no carotid bruit and no JVD Abdomen: soft, non-tender. Bowel sounds normal. No masses,  no organomegaly Extremities: extremities normal, atraumatic, no cyanosis or edema, positive femorals without bruits, normal dp pulses Neurologic: CN, motor and speech grossly normal 
 
Data Review:  
Recent Labs 11/18/18 
1702 WBC 12.4* HGB 15.1 HCT 44.3  Recent Labs 11/18/18 
1702   
K 3.5  CO2 30 * BUN 13  
CREA 1.09  
CA 9.0 ALB 4.0 TBILI 0.5 SGOT 27 ALT 30 Recent Labs 11/18/18 
1702 TROIQ 2.15* No results found for: CHOL, CHOLPOCT, CHOLX, CHLST, CHOLV, HDL, HDLPOC, LDL, LDLCPOC, LDLC, DLDLP, VLDLC, VLDL, TGLX, TRIGL, TRIGP, TGLPOCT, CHHD, CHHDX No intake or output data in the 24 hours ending 11/18/18 1959 Cardiographics Telemetry: normal sinus rhythm ECG: normal sinus rhythm, nonspecific ST and T waves changes Echocardiogram: ordered Assessment:  
  
 Principal Problem: 
  NSTEMI (non-ST elevation myocardial infarction) ([facility] [address] (11/18/2018) Active Problems: 
  Mixed hyperlipidemia (11/18/2018) Tobacco use disorder (11/18/2018) Plan:  
 
46 y. o. with multiple CV risk factors, admitted with NSTEMI: 
Telemetry Serial cardiac markers ASA, beta-blocker, nitrate, statin. Lovenox x1. Antiplatelet agent on cardiac cath table PRN. I will otherwise continue the patient's home regimen. I have discussed the risks and benefits of cardiac catheterization +/- percutaneous coronary intervention. The patient expressed understanding and wishes to proceed. The patient knows to call a nurse immediately if any recurrence/ progression of symptoms. Presumed mixed hyperlipidemia: 
Starting statin. Check lipids in a.m. Tobacco abuse disorder: 
Counseling.

## 2018-11-19 NOTE — CONSULTS
Cardiothoracic Surgery Consult      Subjective:      Chief Complaint: chest pain    Tammy Quiros is a 46 y.o. NSTEMI, non hypertensive, non diabetic, cigarette smoking, cau  male who was referred for opinion and advise regarding coronary revascularization  by Dr. Zachary Mata / Lucy Lange MD.     Patient is a  at a golf course and walks every day. Patient first noted chest tightness two weeks ago while pushing wife's wheelchair. Pain relieved with time/rest. In past 24 hrs. patient noted exertional chest pain with left arm radiation relieved with rest x 2. Denies nausea, vomiting, claudication, palpitations, PND or syncope. ER exam revealed no EKG changes but elevated troponin of 2. Patient underwent cath this morning    Cardiac Testing:     Echocardiogram done     Cardiac catheretization films were personally reviewed  Tight LM  LV 40%    No past medical history on file. Kidney stone  No past surgical history on file. None  Social History     Tobacco Use    Smoking status: Current smoker   Substance Use Topics    Alcohol use: rare      No family history on file. Prior to Admission medications    Not on File       No Known Allergies  Recreational drug use:NO    Román status or cause of death in parents and siblings if  Heart problems, stroke, or vascular disease in family members : NO    HISTORY OF NON COMPLIANCE WITH MEDICINES:NO    Prior to Admission medications    Not on File       REVIEW OF SYSTEMS:     [] Unable to obtain  ROS due to  []mental status change  []sedated   []intubated   [x]Total of 13 systems reviewed as follows:     Review of Systems:   Consititutional: Denies fever or chills. Eyes:  Denies use of glasses or vision problems(cataracts). ENT:  Denies hearing or swallowing difficulty. CV: Denies CP, claudication, HTN. Resp: Denies dyspnea, productive cough.   : Denies dialysis or kidney stone passed this year  GI: Denies ulcers, esophageal strictures, liver problems. M/S: Denies joint or bone problems, or implanted artificial hardware. Skin: Denies varicose veins, edema. Neuro: Denies strokes, or TIAs. Psych: Denies anxiety or depression. Endocrine: Denies thyroid problems or diabetes. Heme/Lymphatic: Denies easy bruising or lymphedema. Objective:     Visit Vitals  /71   Pulse 82   Temp 98 °F (36.7 °C)   Resp 16   Ht 6' (1.829 m)   Wt 187 lb 13.3 oz (85.2 kg)   SpO2 98%   BMI 25.47 kg/m²       EXAM:  General:  Alert, cooperative, no distress   Mouth/Throat: Teeth and gums normal.   Neck: Supple, symmetrical, trachea midline, no adenopathy, thyroid: no enlargement/tenderness/nodules, no carotid bruit and no JVD. Lungs:   Clear to auscultation bilaterally. Heart:  Regular rate and rhythm, S1, S2 normal, no murmur, click, rub or gallop. Abdomen:   Soft, non-tender. Bowel sounds normal. No masses,  No organomegaly. Extremities: Extremities normal, atraumatic, no cyanosis or edema. Pulses: 2+ and symmetric all extremities. Skin:  No rashes or lesions       Neurologic:  Gross motor and sensory apparatus intact. Labs:   Recent Labs     11/18/18  1702   WBC 12.4*   HGB 15.1   HCT 44.3         K 3.5   BUN 13   CREA 1.09   *       DIAGNOSTICS:    Carotid Artery Doppler:    Pulmonary Function Testing:  FVC-   FEV1-     Assessment:     Principal Problem:    NSTEMI (non-ST elevation myocardial infarction) ([facility] (11/18/2018)    Active Problems:    Mixed hyperlipidemia (11/18/2018)      Tobacco use disorder (11/18/2018)        STS Risk Calculator:    Procedure: Isolated CAB  Risk of Mortality:  0.645%  Renal Failure:  0.403%  Permanent Stroke:  0.378%  Prolonged Ventilation:  3.087%  DSW Infection:  0.092%  Reoperation:  1.514%  Morbidity or Mortality:  5.128%  Short Length of Stay:  78.711%  Long Length of Stay:  1.364%    Plan:     Pre- operative assecessment in progress including: Carotid doppler and PFT screening.     A1C and BNP    Discussed in detail expected hospital course.       Signed By: NICOLASA Corbett     November 19, 2018       zion

## 2018-11-19 NOTE — PROGRESS NOTES
11/19/2018 3:59 PM 
 
Admit Date: 11/18/2018 Admit Diagnosis:  
NSTEMI (non-ST elevation myocardial infarction) [facility] Subjective:  
 
Sylvie Posadas denies chest pain or shortness of breath since admission. Current Facility-Administered Medications Medication Dose Route Frequency  mupirocin (BACTROBAN) 2 % ointment   Both Nostrils Q12H  
 sodium chloride (NS) flush 5-10 mL  5-10 mL IntraVENous Q8H  
 sodium chloride (NS) flush 5-10 mL  5-10 mL IntraVENous PRN  
 sodium phosphate (FLEET'S) enema 1 Enema  1 Enema Rectal PRN  
 amiodarone (CORDARONE) tablet 400 mg  400 mg Oral Q12H  
 [START ON 11/20/2018] ceFAZolin (ANCEF) 2 g/20 mL in sterile water IV syringe  2 g IntraVENous ON CALL TO OR  
 zolpidem (AMBIEN) tablet 5 mg  5 mg Oral QHS PRN  
 ascorbic acid (vitamin C) (VITAMIN C) tablet 1,000 mg  1,000 mg Oral DAILY  chlorhexidine (PERIDEX) 0.12 % mouthwash 15 mL  15 mL Oral Q12H  
 nitroglycerin (NITROSTAT) tablet 0.4 mg  0.4 mg SubLINGual Q5MIN PRN  
 metoprolol tartrate (LOPRESSOR) tablet 25 mg  25 mg Oral Q12H  
 nitroglycerin (NITROBID) 2 % ointment 1 Inch  1 Inch Topical Q6H  
 atorvastatin (LIPITOR) tablet 40 mg  40 mg Oral DAILY  aspirin delayed-release tablet 81 mg  81 mg Oral DAILY  sodium chloride (NS) flush 5-10 mL  5-10 mL IntraVENous Q8H  
 sodium chloride (NS) flush 5-10 mL  5-10 mL IntraVENous PRN  
 acetaminophen (TYLENOL) solution 650 mg  650 mg Oral Q4H PRN  
 zolpidem (AMBIEN) tablet 10 mg  10 mg Oral QHS PRN  
 0.9% sodium chloride infusion  100 mL/hr IntraVENous CONTINUOUS Objective:  
  
Physical Exam:   
Visit Vitals /69 Pulse 85 Temp 98.1 °F (36.7 °C) Resp 16 Ht 6' (1.829 m) Wt 187 lb 13.3 oz (85.2 kg) SpO2 93% BMI 25.47 kg/m² Gen:  NAD Mental Status - Alert. General Appearance - Not in acute distress. Chest and Lung Exam  
Inspection: Accessory muscles - No use of accessory muscles in breathing. Auscultation: Breath sounds: - Normal.  
Cardiovascular Inspection: Jugular vein - Bilateral - Inspection Normal.  
Palpation/Percussion:  
Apical Impulse: - Normal.  
Auscultation: Rhythm - Regular. Heart Sounds - S1 WNL and S2 WNL. No S3 or S4. Murmurs & Other Heart Sounds: Auscultation of the heart reveals - No Murmurs. Peripheral Vascular Upper Extremity: Inspection - Bilateral - No Cyanotic nailbeds or Digital clubbing. Lower Extremity:  
Palpation: Edema - Bilateral - No edema. Abdomen:   Soft, non-tender, bowel sounds are active. Neuro: A&O times 3, CN and motor grossly WNL Data Review:  
Recent Labs 11/18/18 
1702 WBC 12.4* HGB 15.1 HCT 44.3  Recent Labs 11/18/18 
1702   
K 3.5  CO2 30 * BUN 13  
CREA 1.09  
CA 9.0 ALB 4.0 TBILI 0.5 SGOT 27 ALT 30 Recent Labs 11/18/18 
1702 TROIQ 2.15* Intake/Output Summary (Last 24 hours) at 11/19/2018 1559 Last data filed at 11/19/2018 1305 Gross per 24 hour Intake  Output 200 ml Net -200 ml Telemetry: normal sinus rhythm Assessment:  
 
Principal Problem: 
  NSTEMI (non-ST elevation myocardial infarction) ([facility] (11/18/2018) Active Problems: 
  Mixed hyperlipidemia (11/18/2018) Tobacco use disorder (11/18/2018) CAD (coronary artery disease), native coronary artery (11/19/2018) Plan:  
 
99% distal LM CAD: 
CT surgery consulted, continue medical management. Echo and pre-op testing today. Lipids: statin Tobacco: 
Counseled.

## 2018-11-19 NOTE — PROCEDURES
[facility]  *** FINAL REPORT ***    Name: Toya Bermudez  MRN: [id number]    Inpatient  : 1965  HIS Order #: [id number]  [id number] [facility] [place] #: [id number]  Date: 2018    TYPE OF TEST: Peripheral Arterial Testing    REASON FOR TEST  radial artery harvest    Right Arm  Segmentals:                     mmHg  Brachial  Radial  Ulnar  Doppler:  PVR:  Digit press.:  Wrist/Brachial:    Left Arm  Segmentals:                     mmHg  Brachial  Radial  Ulnar  Doppler:  PVR:  Digit press.:  Wrist/Brachial:    INTERPRETATION/FINDINGS  PROCEDURE:  Digital PPG waveform and systolic pressures measurements  at rest, with radial artery compression and with ulnar artery  compression. 1.  Normal modified Pavel's test of the left hand. ADDITIONAL COMMENTS    I have personally reviewed the data relevant to the interpretation of  this  study. TECHNOLOGIST: Akua Pinedo.  YUNG Giron, RDMS  Signed: 2018 02:59 PM    PHYSICIAN: Amanda Goldman MD  Signed: 2018 10:01 AM

## 2018-11-20 ENCOUNTER — ANESTHESIA EVENT (OUTPATIENT)
Dept: CARDIOTHORACIC SURGERY | Age: 53
DRG: 234 | End: 2018-11-20
Payer: COMMERCIAL

## 2018-11-20 ENCOUNTER — ANESTHESIA (OUTPATIENT)
Dept: CARDIOTHORACIC SURGERY | Age: 53
DRG: 234 | End: 2018-11-20
Payer: COMMERCIAL

## 2018-11-20 ENCOUNTER — APPOINTMENT (OUTPATIENT)
Dept: GENERAL RADIOLOGY | Age: 53
DRG: 234 | End: 2018-11-20
Attending: PHYSICIAN ASSISTANT
Payer: COMMERCIAL

## 2018-11-20 PROBLEM — I25.10 CAD (CORONARY ARTERY DISEASE): Status: ACTIVE | Noted: 2018-11-20

## 2018-11-20 LAB
ADMINISTERED INITIALS, ADMINIT: NORMAL
ALBUMIN SERPL-MCNC: 3 G/DL (ref 3.5–5)
ALBUMIN/GLOB SERPL: 1.4 {RATIO} (ref 1.1–2.2)
ALP SERPL-CCNC: 57 U/L (ref 45–117)
ALT SERPL-CCNC: 18 U/L (ref 12–78)
ANION GAP SERPL CALC-SCNC: 7 MMOL/L (ref 5–15)
APTT PPP: 26.8 SEC (ref 22.1–32)
ARTERIAL PATENCY WRIST A: ABNORMAL
AST SERPL-CCNC: 26 U/L (ref 15–37)
BASE DEFICIT BLDA-SCNC: 1.5 MMOL/L
BASOPHILS # BLD: 0.1 K/UL (ref 0–0.1)
BASOPHILS NFR BLD: 0 % (ref 0–1)
BDY SITE: ABNORMAL
BILIRUB SERPL-MCNC: 0.6 MG/DL (ref 0.2–1)
BREATHS.SPONTANEOUS ON VENT: 12
BUN SERPL-MCNC: 10 MG/DL (ref 6–20)
BUN/CREAT SERPL: 11 (ref 12–20)
CALCIUM SERPL-MCNC: 7.8 MG/DL (ref 8.5–10.1)
CHLORIDE SERPL-SCNC: 114 MMOL/L (ref 97–108)
CO2 SERPL-SCNC: 24 MMOL/L (ref 21–32)
CREAT SERPL-MCNC: 0.95 MG/DL (ref 0.7–1.3)
D50 ADMINISTERED, D50ADM: 0 ML
D50 ORDER, D50ORD: 0 ML
DIFFERENTIAL METHOD BLD: ABNORMAL
EOSINOPHIL # BLD: 0.2 K/UL (ref 0–0.4)
EOSINOPHIL NFR BLD: 1 % (ref 0–7)
EPAP/CPAP/PEEP, PAPEEP: 6
ERYTHROCYTE [DISTWIDTH] IN BLOOD BY AUTOMATED COUNT: 13.7 % (ref 11.5–14.5)
FIO2 ON VENT: 40 %
GLOBULIN SER CALC-MCNC: 2.1 G/DL (ref 2–4)
GLSCOM COMMENTS: NORMAL
GLUCOSE BLD STRIP.AUTO-MCNC: 131 MG/DL (ref 65–100)
GLUCOSE BLD STRIP.AUTO-MCNC: 135 MG/DL (ref 65–100)
GLUCOSE BLD STRIP.AUTO-MCNC: 139 MG/DL (ref 65–100)
GLUCOSE BLD STRIP.AUTO-MCNC: 93 MG/DL (ref 65–100)
GLUCOSE BLD STRIP.AUTO-MCNC: 95 MG/DL (ref 65–100)
GLUCOSE SERPL-MCNC: 130 MG/DL (ref 65–100)
GLUCOSE, GLC: 131 MG/DL
GLUCOSE, GLC: 135 MG/DL
GLUCOSE, GLC: 139 MG/DL
GLUCOSE, GLC: 95 MG/DL
HCO3 BLDA-SCNC: 24 MMOL/L (ref 22–26)
HCT VFR BLD AUTO: 35.9 % (ref 36.6–50.3)
HGB BLD-MCNC: 12.2 G/DL (ref 12.1–17)
HIGH TARGET, HITG: 130 MG/DL
IMM GRANULOCYTES # BLD: 0.2 K/UL (ref 0–0.04)
IMM GRANULOCYTES NFR BLD AUTO: 1 % (ref 0–0.5)
INR PPP: 1.1 (ref 0.9–1.1)
INSULIN ADMINSTERED, INSADM: 1.8 UNITS/HOUR
INSULIN ADMINSTERED, INSADM: 2.4 UNITS/HOUR
INSULIN ADMINSTERED, INSADM: 2.8 UNITS/HOUR
INSULIN ADMINSTERED, INSADM: 3.8 UNITS/HOUR
INSULIN ORDER, INSORD: 1.8 UNITS/HOUR
INSULIN ORDER, INSORD: 2.4 UNITS/HOUR
INSULIN ORDER, INSORD: 2.8 UNITS/HOUR
INSULIN ORDER, INSORD: 3.8 UNITS/HOUR
LOW TARGET, LOT: 95 MG/DL
LYMPHOCYTES # BLD: 2.1 K/UL (ref 0.8–3.5)
LYMPHOCYTES NFR BLD: 9 % (ref 12–49)
MAGNESIUM SERPL-MCNC: 2 MG/DL (ref 1.6–2.4)
MCH RBC QN AUTO: 30.2 PG (ref 26–34)
MCHC RBC AUTO-ENTMCNC: 34 G/DL (ref 30–36.5)
MCV RBC AUTO: 88.9 FL (ref 80–99)
MINUTES UNTIL NEXT BG, NBG: 60 MIN
MONOCYTES # BLD: 1.4 K/UL (ref 0–1)
MONOCYTES NFR BLD: 6 % (ref 5–13)
MULTIPLIER, MUL: 0.03
MULTIPLIER, MUL: 0.04
MULTIPLIER, MUL: 0.05
MULTIPLIER, MUL: 0.05
NEUTS SEG # BLD: 20 K/UL (ref 1.8–8)
NEUTS SEG NFR BLD: 84 % (ref 32–75)
NRBC # BLD: 0 K/UL (ref 0–0.01)
NRBC BLD-RTO: 0 PER 100 WBC
ORDER INITIALS, ORDINIT: NORMAL
PCO2 BLDA: 44 MMHG (ref 35–45)
PH BLDA: 7.36 [PH] (ref 7.35–7.45)
PLATELET # BLD AUTO: 188 K/UL (ref 150–400)
PMV BLD AUTO: 9.5 FL (ref 8.9–12.9)
PO2 BLDA: 103 MMHG (ref 80–100)
POTASSIUM SERPL-SCNC: 4 MMOL/L (ref 3.5–5.1)
PRESSURE SUPPORT SETTING VENT: 6 CM[H2O]
PROT SERPL-MCNC: 5.1 G/DL (ref 6.4–8.2)
PROTHROMBIN TIME: 11.5 SEC (ref 9–11.1)
RBC # BLD AUTO: 4.04 M/UL (ref 4.1–5.7)
SAO2 % BLD: 98 % (ref 92–97)
SAO2% DEVICE SAO2% SENSOR NAME: ABNORMAL
SERVICE CMNT-IMP: ABNORMAL
SERVICE CMNT-IMP: NORMAL
SERVICE CMNT-IMP: NORMAL
SODIUM SERPL-SCNC: 145 MMOL/L (ref 136–145)
SPECIMEN SITE: ABNORMAL
THERAPEUTIC RANGE,PTTT: NORMAL SECS (ref 58–77)
VENTILATION MODE VENT: ABNORMAL
WBC # BLD AUTO: 23.9 K/UL (ref 4.1–11.1)

## 2018-11-20 PROCEDURE — 74011636637 HC RX REV CODE- 636/637: Performed by: THORACIC SURGERY (CARDIOTHORACIC VASCULAR SURGERY)

## 2018-11-20 PROCEDURE — 77030033150: Performed by: THORACIC SURGERY (CARDIOTHORACIC VASCULAR SURGERY)

## 2018-11-20 PROCEDURE — 77030003010 HC SUT SURG STL J&J -B: Performed by: THORACIC SURGERY (CARDIOTHORACIC VASCULAR SURGERY)

## 2018-11-20 PROCEDURE — 74011250636 HC RX REV CODE- 250/636: Performed by: PHYSICIAN ASSISTANT

## 2018-11-20 PROCEDURE — 77030008684 HC TU ET CUF COVD -B: Performed by: ANESTHESIOLOGY

## 2018-11-20 PROCEDURE — 77030020167 HC PERF SET MULT MEDT -B: Performed by: THORACIC SURGERY (CARDIOTHORACIC VASCULAR SURGERY)

## 2018-11-20 PROCEDURE — 71045 X-RAY EXAM CHEST 1 VIEW: CPT

## 2018-11-20 PROCEDURE — 77030018836 HC SOL IRR NACL ICUM -A: Performed by: THORACIC SURGERY (CARDIOTHORACIC VASCULAR SURGERY)

## 2018-11-20 PROCEDURE — 77030020263 HC SOL INJ SOD CL0.9% LFCR 1000ML: Performed by: THORACIC SURGERY (CARDIOTHORACIC VASCULAR SURGERY)

## 2018-11-20 PROCEDURE — 77030010938 HC CLP LIG TELE -A: Performed by: THORACIC SURGERY (CARDIOTHORACIC VASCULAR SURGERY)

## 2018-11-20 PROCEDURE — 77030010796: Performed by: THORACIC SURGERY (CARDIOTHORACIC VASCULAR SURGERY)

## 2018-11-20 PROCEDURE — 74011250637 HC RX REV CODE- 250/637: Performed by: PHYSICIAN ASSISTANT

## 2018-11-20 PROCEDURE — 77030006994: Performed by: THORACIC SURGERY (CARDIOTHORACIC VASCULAR SURGERY)

## 2018-11-20 PROCEDURE — 77030003020 HC SUT TICRN COVD -A: Performed by: THORACIC SURGERY (CARDIOTHORACIC VASCULAR SURGERY)

## 2018-11-20 PROCEDURE — 94002 VENT MGMT INPAT INIT DAY: CPT

## 2018-11-20 PROCEDURE — 77030006689 HC BLD OPHTH BVR BD -A: Performed by: THORACIC SURGERY (CARDIOTHORACIC VASCULAR SURGERY)

## 2018-11-20 PROCEDURE — 82803 BLOOD GASES ANY COMBINATION: CPT

## 2018-11-20 PROCEDURE — 77030018835 HC SOL IRR LR ICUM -A: Performed by: THORACIC SURGERY (CARDIOTHORACIC VASCULAR SURGERY)

## 2018-11-20 PROCEDURE — 74011250636 HC RX REV CODE- 250/636: Performed by: ANESTHESIOLOGY

## 2018-11-20 PROCEDURE — 77030020256 HC SOL INJ NACL 0.9%  500ML: Performed by: THORACIC SURGERY (CARDIOTHORACIC VASCULAR SURGERY)

## 2018-11-20 PROCEDURE — 85018 HEMOGLOBIN: CPT

## 2018-11-20 PROCEDURE — 77030026438 HC STYL ET INTUB CARD -A: Performed by: ANESTHESIOLOGY

## 2018-11-20 PROCEDURE — 85730 THROMBOPLASTIN TIME PARTIAL: CPT

## 2018-11-20 PROCEDURE — 74011000250 HC RX REV CODE- 250

## 2018-11-20 PROCEDURE — 74011000258 HC RX REV CODE- 258

## 2018-11-20 PROCEDURE — 74011000250 HC RX REV CODE- 250: Performed by: THORACIC SURGERY (CARDIOTHORACIC VASCULAR SURGERY)

## 2018-11-20 PROCEDURE — 77030027138 HC INCENT SPIROMETER -A

## 2018-11-20 PROCEDURE — 77030018846 HC SOL IRR STRL H20 ICUM -A: Performed by: THORACIC SURGERY (CARDIOTHORACIC VASCULAR SURGERY)

## 2018-11-20 PROCEDURE — 85610 PROTHROMBIN TIME: CPT

## 2018-11-20 PROCEDURE — 77030002986 HC SUT PROL J&J -A: Performed by: THORACIC SURGERY (CARDIOTHORACIC VASCULAR SURGERY)

## 2018-11-20 PROCEDURE — 77030006920 HC BLD STRNL SAW STRY -B: Performed by: THORACIC SURGERY (CARDIOTHORACIC VASCULAR SURGERY)

## 2018-11-20 PROCEDURE — 77030013079 HC BLNKT BAIR HGGR 3M -A: Performed by: ANESTHESIOLOGY

## 2018-11-20 PROCEDURE — 77030006247 HC LD PCMKR MYOCRD BPLR TEMP MEDT -B: Performed by: THORACIC SURGERY (CARDIOTHORACIC VASCULAR SURGERY)

## 2018-11-20 PROCEDURE — 36415 COLL VENOUS BLD VENIPUNCTURE: CPT

## 2018-11-20 PROCEDURE — 74011250637 HC RX REV CODE- 250/637: Performed by: INTERNAL MEDICINE

## 2018-11-20 PROCEDURE — 76010000115 HC CV SURG 4.5 TO 5 HR: Performed by: THORACIC SURGERY (CARDIOTHORACIC VASCULAR SURGERY)

## 2018-11-20 PROCEDURE — 5A1221Z PERFORMANCE OF CARDIAC OUTPUT, CONTINUOUS: ICD-10-PCS | Performed by: THORACIC SURGERY (CARDIOTHORACIC VASCULAR SURGERY)

## 2018-11-20 PROCEDURE — 02100Z9 BYPASS CORONARY ARTERY, ONE ARTERY FROM LEFT INTERNAL MAMMARY, OPEN APPROACH: ICD-10-PCS | Performed by: THORACIC SURGERY (CARDIOTHORACIC VASCULAR SURGERY)

## 2018-11-20 PROCEDURE — 77030011255 HC DSG AQUACEL AG BMS -A: Performed by: THORACIC SURGERY (CARDIOTHORACIC VASCULAR SURGERY)

## 2018-11-20 PROCEDURE — 77030011640 HC PAD GRND REM COVD -A: Performed by: THORACIC SURGERY (CARDIOTHORACIC VASCULAR SURGERY)

## 2018-11-20 PROCEDURE — 74011250636 HC RX REV CODE- 250/636: Performed by: THORACIC SURGERY (CARDIOTHORACIC VASCULAR SURGERY)

## 2018-11-20 PROCEDURE — 77030019908 HC STETH ESOPH SIMS -A: Performed by: ANESTHESIOLOGY

## 2018-11-20 PROCEDURE — 06BP4ZZ EXCISION OF RIGHT SAPHENOUS VEIN, PERCUTANEOUS ENDOSCOPIC APPROACH: ICD-10-PCS | Performed by: THORACIC SURGERY (CARDIOTHORACIC VASCULAR SURGERY)

## 2018-11-20 PROCEDURE — 74011250636 HC RX REV CODE- 250/636

## 2018-11-20 PROCEDURE — 77030013861 HC PNCH AORT CLNCUT QUES -B: Performed by: THORACIC SURGERY (CARDIOTHORACIC VASCULAR SURGERY)

## 2018-11-20 PROCEDURE — 77030010389 HC WRE ATR PACE AEMC -B: Performed by: THORACIC SURGERY (CARDIOTHORACIC VASCULAR SURGERY)

## 2018-11-20 PROCEDURE — 74011000258 HC RX REV CODE- 258: Performed by: THORACIC SURGERY (CARDIOTHORACIC VASCULAR SURGERY)

## 2018-11-20 PROCEDURE — P9045 ALBUMIN (HUMAN), 5%, 250 ML: HCPCS | Performed by: PHYSICIAN ASSISTANT

## 2018-11-20 PROCEDURE — 77030018729 HC ELECTRD DEFIB PAD CARD -B: Performed by: THORACIC SURGERY (CARDIOTHORACIC VASCULAR SURGERY)

## 2018-11-20 PROCEDURE — 77030002987 HC SUT PROL J&J -B: Performed by: THORACIC SURGERY (CARDIOTHORACIC VASCULAR SURGERY)

## 2018-11-20 PROCEDURE — 77030034848: Performed by: THORACIC SURGERY (CARDIOTHORACIC VASCULAR SURGERY)

## 2018-11-20 PROCEDURE — 77030010813: Performed by: THORACIC SURGERY (CARDIOTHORACIC VASCULAR SURGERY)

## 2018-11-20 PROCEDURE — 77030010605 HC BLWR MR MAL MEDT -B: Performed by: THORACIC SURGERY (CARDIOTHORACIC VASCULAR SURGERY)

## 2018-11-20 PROCEDURE — 74011000250 HC RX REV CODE- 250: Performed by: PHYSICIAN ASSISTANT

## 2018-11-20 PROCEDURE — 77030002996 HC SUT SLK J&J -A: Performed by: THORACIC SURGERY (CARDIOTHORACIC VASCULAR SURGERY)

## 2018-11-20 PROCEDURE — 77030006921 HC BLD STRNL SAW TERU -B: Performed by: THORACIC SURGERY (CARDIOTHORACIC VASCULAR SURGERY)

## 2018-11-20 PROCEDURE — 77030003037 HC SUT WRE STRNOTMY AEMC -B: Performed by: THORACIC SURGERY (CARDIOTHORACIC VASCULAR SURGERY)

## 2018-11-20 PROCEDURE — C9113 INJ PANTOPRAZOLE SODIUM, VIA: HCPCS | Performed by: PHYSICIAN ASSISTANT

## 2018-11-20 PROCEDURE — 77030013798 HC KT TRNSDUC PRSSR EDWD -B: Performed by: THORACIC SURGERY (CARDIOTHORACIC VASCULAR SURGERY)

## 2018-11-20 PROCEDURE — 77030011256 HC DRSG MEPILEX <16IN NO BORD MOLN -A: Performed by: THORACIC SURGERY (CARDIOTHORACIC VASCULAR SURGERY)

## 2018-11-20 PROCEDURE — 80053 COMPREHEN METABOLIC PANEL: CPT

## 2018-11-20 PROCEDURE — 021009W BYPASS CORONARY ARTERY, ONE ARTERY FROM AORTA WITH AUTOLOGOUS VENOUS TISSUE, OPEN APPROACH: ICD-10-PCS | Performed by: THORACIC SURGERY (CARDIOTHORACIC VASCULAR SURGERY)

## 2018-11-20 PROCEDURE — 76060000040 HC ANESTHESIA 4.5 TO 5 HR: Performed by: THORACIC SURGERY (CARDIOTHORACIC VASCULAR SURGERY)

## 2018-11-20 PROCEDURE — 77030019579 HC CBL PACE DISP REMG -B: Performed by: THORACIC SURGERY (CARDIOTHORACIC VASCULAR SURGERY)

## 2018-11-20 PROCEDURE — 77030012390 HC DRN CHST BTL GTNG -B: Performed by: THORACIC SURGERY (CARDIOTHORACIC VASCULAR SURGERY)

## 2018-11-20 PROCEDURE — 82962 GLUCOSE BLOOD TEST: CPT

## 2018-11-20 PROCEDURE — B246ZZ4 ULTRASONOGRAPHY OF RIGHT AND LEFT HEART, TRANSESOPHAGEAL: ICD-10-PCS | Performed by: ANESTHESIOLOGY

## 2018-11-20 PROCEDURE — 77030013904 HC PRB VASC PARSONT IMPR -B: Performed by: THORACIC SURGERY (CARDIOTHORACIC VASCULAR SURGERY)

## 2018-11-20 PROCEDURE — 77030002933 HC SUT MCRYL J&J -A: Performed by: THORACIC SURGERY (CARDIOTHORACIC VASCULAR SURGERY)

## 2018-11-20 PROCEDURE — 77030012407 HC DRN WND BARD -B: Performed by: THORACIC SURGERY (CARDIOTHORACIC VASCULAR SURGERY)

## 2018-11-20 PROCEDURE — 77030022199 HC SYS HARV VESL GTNG -G1: Performed by: THORACIC SURGERY (CARDIOTHORACIC VASCULAR SURGERY)

## 2018-11-20 PROCEDURE — 77030010818: Performed by: THORACIC SURGERY (CARDIOTHORACIC VASCULAR SURGERY)

## 2018-11-20 PROCEDURE — 77030008771 HC TU NG SALEM SUMP -A: Performed by: ANESTHESIOLOGY

## 2018-11-20 PROCEDURE — 65620000000 HC RM CCU GENERAL

## 2018-11-20 PROCEDURE — 77030002912 HC SUT ETHBND J&J -A: Performed by: THORACIC SURGERY (CARDIOTHORACIC VASCULAR SURGERY)

## 2018-11-20 PROCEDURE — C1751 CATH, INF, PER/CENT/MIDLINE: HCPCS | Performed by: THORACIC SURGERY (CARDIOTHORACIC VASCULAR SURGERY)

## 2018-11-20 PROCEDURE — 74011000258 HC RX REV CODE- 258: Performed by: PHYSICIAN ASSISTANT

## 2018-11-20 PROCEDURE — 82330 ASSAY OF CALCIUM: CPT

## 2018-11-20 PROCEDURE — 85025 COMPLETE CBC W/AUTO DIFF WBC: CPT

## 2018-11-20 PROCEDURE — 77030010797: Performed by: THORACIC SURGERY (CARDIOTHORACIC VASCULAR SURGERY)

## 2018-11-20 PROCEDURE — 83735 ASSAY OF MAGNESIUM: CPT

## 2018-11-20 RX ORDER — SODIUM CHLORIDE 9 MG/ML
INJECTION, SOLUTION INTRAVENOUS
Status: DISCONTINUED | OUTPATIENT
Start: 2018-11-20 | End: 2018-11-20 | Stop reason: HOSPADM

## 2018-11-20 RX ORDER — SODIUM CHLORIDE, SODIUM LACTATE, POTASSIUM CHLORIDE, CALCIUM CHLORIDE 600; 310; 30; 20 MG/100ML; MG/100ML; MG/100ML; MG/100ML
25 INJECTION, SOLUTION INTRAVENOUS CONTINUOUS
Status: DISCONTINUED | OUTPATIENT
Start: 2018-11-20 | End: 2018-11-20

## 2018-11-20 RX ORDER — SODIUM CHLORIDE 0.9 % (FLUSH) 0.9 %
5-10 SYRINGE (ML) INJECTION AS NEEDED
Status: DISCONTINUED | OUTPATIENT
Start: 2018-11-20 | End: 2018-11-21

## 2018-11-20 RX ORDER — MORPHINE SULFATE 2 MG/ML
1-2 INJECTION, SOLUTION INTRAMUSCULAR; INTRAVENOUS
Status: DISCONTINUED | OUTPATIENT
Start: 2018-11-20 | End: 2018-11-21

## 2018-11-20 RX ORDER — HEPARIN SODIUM 1000 [USP'U]/ML
INJECTION, SOLUTION INTRAVENOUS; SUBCUTANEOUS AS NEEDED
Status: DISCONTINUED | OUTPATIENT
Start: 2018-11-20 | End: 2018-11-20 | Stop reason: HOSPADM

## 2018-11-20 RX ORDER — ONDANSETRON 2 MG/ML
4 INJECTION INTRAMUSCULAR; INTRAVENOUS
Status: DISCONTINUED | OUTPATIENT
Start: 2018-11-20 | End: 2018-11-21

## 2018-11-20 RX ORDER — LANOLIN ALCOHOL/MO/W.PET/CERES
400 CREAM (GRAM) TOPICAL 2 TIMES DAILY
Status: DISCONTINUED | OUTPATIENT
Start: 2018-11-21 | End: 2018-11-25 | Stop reason: HOSPADM

## 2018-11-20 RX ORDER — ACETAMINOPHEN 10 MG/ML
1000 INJECTION, SOLUTION INTRAVENOUS EVERY 6 HOURS
Status: COMPLETED | OUTPATIENT
Start: 2018-11-20 | End: 2018-11-21

## 2018-11-20 RX ORDER — GLYCOPYRROLATE 0.2 MG/ML
INJECTION INTRAMUSCULAR; INTRAVENOUS AS NEEDED
Status: DISCONTINUED | OUTPATIENT
Start: 2018-11-20 | End: 2018-11-20 | Stop reason: HOSPADM

## 2018-11-20 RX ORDER — SUFENTANIL CITRATE 50 UG/ML
INJECTION EPIDURAL; INTRAVENOUS
Status: DISCONTINUED | OUTPATIENT
Start: 2018-11-20 | End: 2018-11-20 | Stop reason: HOSPADM

## 2018-11-20 RX ORDER — NALOXONE HYDROCHLORIDE 0.4 MG/ML
0.4 INJECTION, SOLUTION INTRAMUSCULAR; INTRAVENOUS; SUBCUTANEOUS AS NEEDED
Status: DISCONTINUED | OUTPATIENT
Start: 2018-11-20 | End: 2018-11-21

## 2018-11-20 RX ORDER — SODIUM CHLORIDE 9 MG/ML
75 INJECTION, SOLUTION INTRAVENOUS CONTINUOUS
Status: DISCONTINUED | OUTPATIENT
Start: 2018-11-20 | End: 2018-11-21

## 2018-11-20 RX ORDER — SODIUM CHLORIDE 450 MG/100ML
10 INJECTION, SOLUTION INTRAVENOUS CONTINUOUS
Status: DISCONTINUED | OUTPATIENT
Start: 2018-11-20 | End: 2018-11-21

## 2018-11-20 RX ORDER — CEFAZOLIN SODIUM 1 G/3ML
1 INJECTION, POWDER, FOR SOLUTION INTRAMUSCULAR; INTRAVENOUS ONCE
Status: COMPLETED | OUTPATIENT
Start: 2018-11-20 | End: 2018-11-20

## 2018-11-20 RX ORDER — OXYCODONE HYDROCHLORIDE 5 MG/1
5-10 TABLET ORAL
Status: DISCONTINUED | OUTPATIENT
Start: 2018-11-20 | End: 2018-11-25 | Stop reason: HOSPADM

## 2018-11-20 RX ORDER — ALBUMIN HUMAN 50 G/1000ML
12.5 SOLUTION INTRAVENOUS
Status: DISCONTINUED | OUTPATIENT
Start: 2018-11-20 | End: 2018-11-21

## 2018-11-20 RX ORDER — SUFENTANIL CITRATE 50 UG/ML
INJECTION EPIDURAL; INTRAVENOUS AS NEEDED
Status: DISCONTINUED | OUTPATIENT
Start: 2018-11-20 | End: 2018-11-20 | Stop reason: HOSPADM

## 2018-11-20 RX ORDER — SODIUM CHLORIDE, SODIUM LACTATE, POTASSIUM CHLORIDE, CALCIUM CHLORIDE 600; 310; 30; 20 MG/100ML; MG/100ML; MG/100ML; MG/100ML
INJECTION, SOLUTION INTRAVENOUS
Status: DISCONTINUED | OUTPATIENT
Start: 2018-11-20 | End: 2018-11-20 | Stop reason: HOSPADM

## 2018-11-20 RX ORDER — CALCIUM CHLORIDE INJECTION 100 MG/ML
1 INJECTION, SOLUTION INTRAVENOUS AS NEEDED
Status: DISCONTINUED | OUTPATIENT
Start: 2018-11-20 | End: 2018-11-25 | Stop reason: HOSPADM

## 2018-11-20 RX ORDER — AMIODARONE HYDROCHLORIDE 200 MG/1
400 TABLET ORAL 2 TIMES DAILY
Status: DISCONTINUED | OUTPATIENT
Start: 2018-11-20 | End: 2018-11-21

## 2018-11-20 RX ORDER — HEPARIN SODIUM 1000 [USP'U]/ML
2000 INJECTION, SOLUTION INTRAVENOUS; SUBCUTANEOUS ONCE
Status: COMPLETED | OUTPATIENT
Start: 2018-11-20 | End: 2018-11-20

## 2018-11-20 RX ORDER — PROTAMINE SULFATE 10 MG/ML
INJECTION, SOLUTION INTRAVENOUS AS NEEDED
Status: DISCONTINUED | OUTPATIENT
Start: 2018-11-20 | End: 2018-11-20 | Stop reason: HOSPADM

## 2018-11-20 RX ORDER — PHENYLEPHRINE HCL IN 0.9% NACL 30MG/250ML
10-100 PLASTIC BAG, INJECTION (ML) INTRAVENOUS
Status: DISCONTINUED | OUTPATIENT
Start: 2018-11-20 | End: 2018-11-21

## 2018-11-20 RX ORDER — SODIUM BICARBONATE 1 MEQ/ML
SYRINGE (ML) INTRAVENOUS
Status: COMPLETED
Start: 2018-11-20 | End: 2018-11-20

## 2018-11-20 RX ORDER — PAPAVERINE HYDROCHLORIDE 30 MG/ML
30 INJECTION INTRAMUSCULAR; INTRAVENOUS ONCE
Status: COMPLETED | OUTPATIENT
Start: 2018-11-20 | End: 2018-11-20

## 2018-11-20 RX ORDER — ROCURONIUM BROMIDE 10 MG/ML
INJECTION, SOLUTION INTRAVENOUS AS NEEDED
Status: DISCONTINUED | OUTPATIENT
Start: 2018-11-20 | End: 2018-11-20 | Stop reason: HOSPADM

## 2018-11-20 RX ORDER — MAGNESIUM SULFATE 1 G/100ML
1 INJECTION INTRAVENOUS DAILY
Status: DISCONTINUED | OUTPATIENT
Start: 2018-11-21 | End: 2018-11-21

## 2018-11-20 RX ORDER — DEXTROSE 50 % IN WATER (D50W) INTRAVENOUS SYRINGE
12.5-25 AS NEEDED
Status: DISCONTINUED | OUTPATIENT
Start: 2018-11-20 | End: 2018-11-21

## 2018-11-20 RX ORDER — PROPOFOL 10 MG/ML
INJECTION, EMULSION INTRAVENOUS AS NEEDED
Status: DISCONTINUED | OUTPATIENT
Start: 2018-11-20 | End: 2018-11-20 | Stop reason: HOSPADM

## 2018-11-20 RX ORDER — NEOSTIGMINE METHYLSULFATE 1 MG/ML
INJECTION INTRAVENOUS AS NEEDED
Status: DISCONTINUED | OUTPATIENT
Start: 2018-11-20 | End: 2018-11-20 | Stop reason: HOSPADM

## 2018-11-20 RX ORDER — SODIUM CHLORIDE 0.9 % (FLUSH) 0.9 %
5-10 SYRINGE (ML) INJECTION EVERY 8 HOURS
Status: DISCONTINUED | OUTPATIENT
Start: 2018-11-20 | End: 2018-11-25 | Stop reason: HOSPADM

## 2018-11-20 RX ORDER — FENTANYL CITRATE 50 UG/ML
INJECTION, SOLUTION INTRAMUSCULAR; INTRAVENOUS AS NEEDED
Status: DISCONTINUED | OUTPATIENT
Start: 2018-11-20 | End: 2018-11-20 | Stop reason: HOSPADM

## 2018-11-20 RX ORDER — INSULIN LISPRO 100 [IU]/ML
INJECTION, SOLUTION INTRAVENOUS; SUBCUTANEOUS
Status: DISPENSED | OUTPATIENT
Start: 2018-11-21 | End: 2018-11-22

## 2018-11-20 RX ORDER — SUCCINYLCHOLINE CHLORIDE 20 MG/ML
INJECTION INTRAMUSCULAR; INTRAVENOUS AS NEEDED
Status: DISCONTINUED | OUTPATIENT
Start: 2018-11-20 | End: 2018-11-20 | Stop reason: HOSPADM

## 2018-11-20 RX ORDER — AMOXICILLIN 250 MG
1 CAPSULE ORAL DAILY
Status: DISCONTINUED | OUTPATIENT
Start: 2018-11-21 | End: 2018-11-25 | Stop reason: HOSPADM

## 2018-11-20 RX ORDER — MORPHINE SULFATE 10 MG/ML
INJECTION, SOLUTION INTRAMUSCULAR; INTRAVENOUS AS NEEDED
Status: DISCONTINUED | OUTPATIENT
Start: 2018-11-20 | End: 2018-11-20 | Stop reason: HOSPADM

## 2018-11-20 RX ORDER — CEFAZOLIN SODIUM/WATER 2 G/20 ML
2 SYRINGE (ML) INTRAVENOUS EVERY 6 HOURS
Status: COMPLETED | OUTPATIENT
Start: 2018-11-21 | End: 2018-11-22

## 2018-11-20 RX ORDER — MAGNESIUM SULFATE 100 %
4 CRYSTALS MISCELLANEOUS AS NEEDED
Status: DISCONTINUED | OUTPATIENT
Start: 2018-11-20 | End: 2018-11-25 | Stop reason: HOSPADM

## 2018-11-20 RX ORDER — PANTOPRAZOLE SODIUM 40 MG/1
40 TABLET, DELAYED RELEASE ORAL
Status: DISCONTINUED | OUTPATIENT
Start: 2018-11-21 | End: 2018-11-25 | Stop reason: HOSPADM

## 2018-11-20 RX ORDER — MIDAZOLAM HYDROCHLORIDE 1 MG/ML
INJECTION, SOLUTION INTRAMUSCULAR; INTRAVENOUS AS NEEDED
Status: DISCONTINUED | OUTPATIENT
Start: 2018-11-20 | End: 2018-11-20 | Stop reason: HOSPADM

## 2018-11-20 RX ORDER — LIDOCAINE HYDROCHLORIDE 20 MG/ML
INJECTION, SOLUTION EPIDURAL; INFILTRATION; INTRACAUDAL; PERINEURAL AS NEEDED
Status: DISCONTINUED | OUTPATIENT
Start: 2018-11-20 | End: 2018-11-20 | Stop reason: HOSPADM

## 2018-11-20 RX ORDER — POLYETHYLENE GLYCOL 3350 17 G/17G
34 POWDER, FOR SOLUTION ORAL DAILY
Status: DISCONTINUED | OUTPATIENT
Start: 2018-11-21 | End: 2018-11-25 | Stop reason: HOSPADM

## 2018-11-20 RX ORDER — ACETAMINOPHEN 325 MG/1
650 TABLET ORAL
Status: DISCONTINUED | OUTPATIENT
Start: 2018-11-20 | End: 2018-11-21

## 2018-11-20 RX ORDER — ATORVASTATIN CALCIUM 20 MG/1
20 TABLET, FILM COATED ORAL DAILY
Status: DISCONTINUED | OUTPATIENT
Start: 2018-11-21 | End: 2018-11-21

## 2018-11-20 RX ORDER — DOBUTAMINE HYDROCHLORIDE 200 MG/100ML
2.5-1 INJECTION INTRAVENOUS
Status: DISCONTINUED | OUTPATIENT
Start: 2018-11-20 | End: 2018-11-21

## 2018-11-20 RX ORDER — ALBUTEROL SULFATE 0.83 MG/ML
2.5 SOLUTION RESPIRATORY (INHALATION)
Status: DISCONTINUED | OUTPATIENT
Start: 2018-11-20 | End: 2018-11-25 | Stop reason: HOSPADM

## 2018-11-20 RX ORDER — INSULIN LISPRO 100 [IU]/ML
INJECTION, SOLUTION INTRAVENOUS; SUBCUTANEOUS
Status: DISCONTINUED | OUTPATIENT
Start: 2018-11-22 | End: 2018-11-23

## 2018-11-20 RX ADMIN — OXYCODONE HYDROCHLORIDE AND ACETAMINOPHEN 1000 MG: 500 TABLET ORAL at 08:18

## 2018-11-20 RX ADMIN — PAPAVERINE HYDROCHLORIDE 30 MG: 30 INJECTION, SOLUTION INTRAVENOUS at 15:00

## 2018-11-20 RX ADMIN — DEXMEDETOMIDINE HYDROCHLORIDE 0.2 MCG/KG/HR: 100 INJECTION, SOLUTION INTRAVENOUS at 17:03

## 2018-11-20 RX ADMIN — PROPOFOL 100 MG: 10 INJECTION, EMULSION INTRAVENOUS at 14:46

## 2018-11-20 RX ADMIN — SODIUM CHLORIDE 9 ML/HR: 900 INJECTION, SOLUTION INTRAVENOUS at 20:09

## 2018-11-20 RX ADMIN — AMIODARONE HYDROCHLORIDE 400 MG: 200 TABLET ORAL at 08:18

## 2018-11-20 RX ADMIN — MORPHINE SULFATE 2 MG: 2 INJECTION, SOLUTION INTRAMUSCULAR; INTRAVENOUS at 23:13

## 2018-11-20 RX ADMIN — GLYCOPYRROLATE 0.2 MG: 0.2 INJECTION INTRAMUSCULAR; INTRAVENOUS at 19:39

## 2018-11-20 RX ADMIN — ATORVASTATIN CALCIUM 40 MG: 40 TABLET, FILM COATED ORAL at 08:18

## 2018-11-20 RX ADMIN — CHLORHEXIDINE GLUCONATE 15 ML: 1.2 RINSE ORAL at 22:18

## 2018-11-20 RX ADMIN — SODIUM CHLORIDE 10 ML/HR: 450 INJECTION, SOLUTION INTRAVENOUS at 20:09

## 2018-11-20 RX ADMIN — Medication 10 ML: at 06:00

## 2018-11-20 RX ADMIN — Medication 2 G: at 18:08

## 2018-11-20 RX ADMIN — ALBUMIN (HUMAN) 12.5 G: 12.5 INJECTION, SOLUTION INTRAVENOUS at 22:05

## 2018-11-20 RX ADMIN — HEPARIN SODIUM 2000 UNITS: 1000 INJECTION, SOLUTION INTRAVENOUS; SUBCUTANEOUS at 15:00

## 2018-11-20 RX ADMIN — SODIUM CHLORIDE 2.4 UNITS/HR: 900 INJECTION, SOLUTION INTRAVENOUS at 19:57

## 2018-11-20 RX ADMIN — ASPIRIN 81 MG: 81 TABLET, COATED ORAL at 08:18

## 2018-11-20 RX ADMIN — FENTANYL CITRATE 50 MCG: 50 INJECTION, SOLUTION INTRAMUSCULAR; INTRAVENOUS at 14:45

## 2018-11-20 RX ADMIN — FENTANYL CITRATE 100 MCG: 50 INJECTION, SOLUTION INTRAMUSCULAR; INTRAVENOUS at 14:37

## 2018-11-20 RX ADMIN — SODIUM CHLORIDE 40 MG: 9 INJECTION, SOLUTION INTRAMUSCULAR; INTRAVENOUS; SUBCUTANEOUS at 20:26

## 2018-11-20 RX ADMIN — Medication 10 MCG/MIN: at 20:45

## 2018-11-20 RX ADMIN — LIDOCAINE HYDROCHLORIDE 100 MG: 20 INJECTION, SOLUTION EPIDURAL; INFILTRATION; INTRACAUDAL; PERINEURAL at 14:46

## 2018-11-20 RX ADMIN — SUFENTANIL CITRATE 0.1 MCG/KG/HR: 50 INJECTION EPIDURAL; INTRAVENOUS at 14:55

## 2018-11-20 RX ADMIN — AMINOCAPROIC ACID 10 G/HR: 250 INJECTION, SOLUTION INTRAVENOUS at 14:50

## 2018-11-20 RX ADMIN — SUFENTANIL CITRATE 30 MCG: 50 INJECTION EPIDURAL; INTRAVENOUS at 15:27

## 2018-11-20 RX ADMIN — METOPROLOL TARTRATE 25 MG: 25 TABLET ORAL at 08:18

## 2018-11-20 RX ADMIN — SODIUM CHLORIDE 500 ML: 900 INJECTION, SOLUTION INTRAVENOUS at 20:23

## 2018-11-20 RX ADMIN — ROCURONIUM BROMIDE 40 MG: 10 INJECTION, SOLUTION INTRAVENOUS at 15:02

## 2018-11-20 RX ADMIN — ROCURONIUM BROMIDE 50 MG: 10 INJECTION, SOLUTION INTRAVENOUS at 16:32

## 2018-11-20 RX ADMIN — SODIUM CHLORIDE, SODIUM LACTATE, POTASSIUM CHLORIDE, CALCIUM CHLORIDE: 600; 310; 30; 20 INJECTION, SOLUTION INTRAVENOUS at 14:40

## 2018-11-20 RX ADMIN — NITROGLYCERIN 1 INCH: 20 OINTMENT TOPICAL at 00:07

## 2018-11-20 RX ADMIN — MUPIROCIN: 20 OINTMENT TOPICAL at 08:19

## 2018-11-20 RX ADMIN — ACETAMINOPHEN 1000 MG: 10 INJECTION, SOLUTION INTRAVENOUS at 20:26

## 2018-11-20 RX ADMIN — HEPARIN SODIUM 34000 UNITS: 1000 INJECTION, SOLUTION INTRAVENOUS; SUBCUTANEOUS at 16:20

## 2018-11-20 RX ADMIN — HEPARIN SODIUM 10000 UNITS: 1000 INJECTION, SOLUTION INTRAVENOUS; SUBCUTANEOUS at 16:45

## 2018-11-20 RX ADMIN — NITROGLYCERIN 1 INCH: 20 OINTMENT TOPICAL at 06:00

## 2018-11-20 RX ADMIN — PROTAMINE SULFATE 250 MG: 10 INJECTION, SOLUTION INTRAVENOUS at 18:06

## 2018-11-20 RX ADMIN — Medication 2 G: at 15:10

## 2018-11-20 RX ADMIN — SUCCINYLCHOLINE CHLORIDE 140 MG: 20 INJECTION INTRAMUSCULAR; INTRAVENOUS at 14:47

## 2018-11-20 RX ADMIN — MIDAZOLAM HYDROCHLORIDE 2 MG: 1 INJECTION, SOLUTION INTRAMUSCULAR; INTRAVENOUS at 14:40

## 2018-11-20 RX ADMIN — CHLORHEXIDINE GLUCONATE 15 ML: 1.2 RINSE ORAL at 08:19

## 2018-11-20 RX ADMIN — NEOSTIGMINE METHYLSULFATE 2 MG: 1 INJECTION INTRAVENOUS at 19:39

## 2018-11-20 RX ADMIN — CEFAZOLIN 1 G: 1 INJECTION, POWDER, FOR SOLUTION INTRAMUSCULAR; INTRAVENOUS; PARENTERAL at 18:21

## 2018-11-20 RX ADMIN — AMIODARONE HYDROCHLORIDE 1 MG/MIN: 50 INJECTION, SOLUTION INTRAVENOUS at 17:32

## 2018-11-20 RX ADMIN — Medication 10 ML: at 22:19

## 2018-11-20 RX ADMIN — ACETAMINOPHEN 650 MG: 160 SOLUTION ORAL at 03:55

## 2018-11-20 RX ADMIN — ALBUMIN (HUMAN) 12.5 G: 12.5 INJECTION, SOLUTION INTRAVENOUS at 19:50

## 2018-11-20 RX ADMIN — SUFENTANIL CITRATE 10 MCG: 50 INJECTION EPIDURAL; INTRAVENOUS at 16:30

## 2018-11-20 RX ADMIN — MORPHINE SULFATE 2 MG: 2 INJECTION, SOLUTION INTRAMUSCULAR; INTRAVENOUS at 21:03

## 2018-11-20 RX ADMIN — ROCURONIUM BROMIDE 10 MG: 10 INJECTION, SOLUTION INTRAVENOUS at 14:47

## 2018-11-20 RX ADMIN — MORPHINE SULFATE 2 MG: 10 INJECTION, SOLUTION INTRAMUSCULAR; INTRAVENOUS at 19:39

## 2018-11-20 RX ADMIN — SODIUM CHLORIDE: 9 INJECTION, SOLUTION INTRAVENOUS at 14:37

## 2018-11-20 RX ADMIN — SODIUM BICARBONATE 50 MEQ: 84 INJECTION, SOLUTION INTRAVENOUS at 20:09

## 2018-11-20 RX ADMIN — SODIUM CHLORIDE, SODIUM LACTATE, POTASSIUM CHLORIDE, AND CALCIUM CHLORIDE 25 ML/HR: 600; 310; 30; 20 INJECTION, SOLUTION INTRAVENOUS at 11:35

## 2018-11-20 RX ADMIN — PHENYLEPHRINE HYDROCHLORIDE 40 MCG/MIN: 10 INJECTION INTRAVENOUS at 18:51

## 2018-11-20 NOTE — PROGRESS NOTES
0700 Bedside and Verbal shift change report received from Jessica Givens, [address] [facility] (offgoing nurse). Report included the following information SBAR, Kardex, Intake/Output, MAR, Accordion and Recent Results. 0800 Assessment complete. See flowsheet for details. 0830 Medication information provided. 0088 Report given to [facility], RN. Patient to be taken downstairs between 0265-6118. Patient updated. Wife and son at bedside. 0915 Incentive spirometer and teaching provided. 0930 CHG bath complete. 1030 Patient down to pre-operative holding area. Family escorted to waiting room.

## 2018-11-20 NOTE — PROGRESS NOTES
Care Management: 
 
Reason for Admission:  Admitted with MI and for CABG this afternoon. He is not active with his PCP and I did give him a list of BS physicians. He uses [facility] at [facility].  and has two grown sons. Patient is independent and active. RRAT Score:         4 Plan for utilizing home health: He would like [facility] if [facility] is ordered. FOC on chart. Likelihood of Readmission:  low Transition of Care Plan:    Anticipate home with family and [facility] and follow up with his doctors. Care Management Interventions PCP Verified by CM: No 
Mode of Transport at Discharge: Other (see comment)(son) Transition of Care Consult (CM Consult): Home Health [address]: Yes Current Support Network: Lives with Spouse(Lives with his wife and family and independent with ADL's. He has DME at home because his wife uses it. He drives. ) Confirm Follow Up Transport: Family(Son or self. Wife does not drive. ) Plan discussed with Pt/Family/Caregiver: Yes(Met with patient bedside.) Freedom of Choice Offered: Yes Discharge Location Discharge Placement: Home with home health(Anticipate home with home health, patient would like [facility] if [facility] is ordered. FOC on chart. ) Raysa Cho [facility] [phone]

## 2018-11-20 NOTE — ANESTHESIA PROCEDURE NOTES
Arterial Line Placement Start time: 11/20/2018 11:24 AM 
End time: 11/20/2018 11:28 AM 
Performed by: Estil Gitelman, MD 
Authorized by: Estil Gitelman, MD  
 
Pre-Procedure Indications:  Arterial pressure monitoring and blood sampling Preanesthetic Checklist: patient identified, risks and benefits discussed, anesthesia consent, patient being monitored, timeout performed and patient being monitored Procedure:  
Prep:  Chlorhexidine Seldinger Technique?: Yes Orientation:  Right Location:  Radial artery Catheter size:  20 G Number of attempts:  1 Cont Cardiac Output Sensor: No   
 
Assessment:  
Post-procedure:  Line secured and sterile dressing applied Patient Tolerance:  Patient tolerated the procedure well with no immediate complications

## 2018-11-20 NOTE — ANESTHESIA PREPROCEDURE EVALUATION
Anesthetic History No history of anesthetic complications Review of Systems / Medical History Patient summary reviewed, nursing notes reviewed and pertinent labs reviewed Pulmonary Smoker Neuro/Psych Within defined limits Cardiovascular CAD 
 
 
  
GI/Hepatic/Renal 
Within defined limits Endo/Other Within defined limits Other Findings Physical Exam 
 
Airway Mallampati: II 
TM Distance: > 6 cm Neck ROM: normal range of motion Mouth opening: Normal 
 
 Cardiovascular Regular rate and rhythm,  S1 and S2 normal,  no murmur, click, rub, or gallop Dental 
No notable dental hx Pulmonary Breath sounds clear to auscultation Abdominal 
GI exam deferred Other Findings Anesthetic Plan ASA: 4 Anesthesia type: general 
 
Monitoring Plan: Arterial line, BIS, CVP, Condon-Shantal and DM Post procedure ventilation Induction: Intravenous Anesthetic plan and risks discussed with: Patient

## 2018-11-20 NOTE — PROGRESS NOTES
[facility] Consult Service Progress NOTEPulmonary, Critical Care, and Sleep Medicine Name: Jocelyne Tirado MRN: [id number] : 1965 Hospital: [facility] Date: 2018  Admission Date: 2018 Hospital Day: 3 Last 24 hrs: Pt has his baseline coughing. Has been a long term smoker. NO chest pain, pressure, back pain, abdominal pain. Slept ok last pm. Has pretty good exercise tolerance prior to admission. Chart and notes reviewed. Data reviewed. I have evaluated and examined the patient. Pt now in CCU following cardiac cath Excerpts from admission and consult notes reviewed as follows: Wendy Montana is a 46 y.o. male with no significant PMHx, presents himself w/ family to the ED with cc of intermittent, sharp mid CP (18). Pt reports associated left arm numbness since onset of pain. He states that initial episode of CP came onto him while he was weeding approximately 6hrs ago today. Pt states that the episode of CP lasted for about 5 minutes until it resolved. He reports of a second episode of CP at around 3:00 PM of which he took a baby ASA. Pt notes no significant improvement of pain with taking the baby ASA. He endorses exacerbation of pain with exertion. EKG revealed no acute ischemic changes. Initial troponin was positive at 2. He is currently pain-free. In the ER, he was given aspirin, beta-blocker, Lovenox, nitroglycerin paste. \" 
 
Pt taken to cath lab by Dr. Luanne Pérez and found to have tight LM and LVEF 40%. Seen by Kike He and will need 2 V CABG. Pt has also had chest pain while pushing wife's wheelchair. Smokes. Non diabetic. Pt manages golf range, course. Seasonal allergies. Occasional wheeze, yellow sputum. No hemoptysis. Does not take flushots IMPRESSION:  
1. CAD - severe tight LM 2. Smoker since teen 1 PPD x 35+ yrs 3. Additional workup outlined below 4. Multiorgan dysfunction as outlined above: Pt has one or more acute or chronic illnesses with severe exacerbation with progression or side effects of treatment that poses a threat to life or bodily function 5. Pt is requiring Drug therapy requiring intensive monitoring for toxicity RECOMMENDATIONS/PLAN:  
1. CCU monitoring before and after his CABG. Appears medically stable to proceed with surgery. 2. Discussed smoking cessation with pts wife and the pt.  
3. Incentive Spirometry 4. Sputum culture if availble 5. Monitor bronchial secretions 6. Bronchial hygiene therapy 7. Bronchodilators prn 
8. Smoking cessation counseling done- pt will followup for more discussions after he recovers from CABG 9. PT, OT when able 10. Will follow CXR post op. 11. Pt needs IV fluids with additives and Drug therapy requiring intensive monitoring for toxicity 12. DVT, SUP prophylaxiss 13. Glucose monitoring and insulin management for tight glycemic control 14. Prescription drug management with home med reconciliation done [x] High complexity decision making was performed [x] See my orders for details Social History Tobacco Use  Smoking status: Not on file Substance Use Topics  Alcohol use: Not on file No family history on file. No Known Allergies MAR reviewed and pertinent medications noted or modified as needed Current Facility-Administered Medications Medication  mupirocin (BACTROBAN) 2 % ointment  sodium chloride (NS) flush 5-10 mL  sodium chloride (NS) flush 5-10 mL  sodium phosphate (FLEET'S) enema 1 Enema  amiodarone (CORDARONE) tablet 400 mg  
 ceFAZolin (ANCEF) 2 g/20 mL in sterile water IV syringe  zolpidem (AMBIEN) tablet 5 mg  ascorbic acid (vitamin C) (VITAMIN C) tablet 1,000 mg  chlorhexidine (PERIDEX) 0.12 % mouthwash 15 mL  DOBUTamine (DOBUTREX) 2,000 mcg/ml infusion  DOPamine (INTROPIN) 800 mg in dextrose 5% 250 mL infusion  magnesium sulfate 2 g/50 ml IVPB (premix or compounded)  nitroglycerin (Tridil) 200 mcg/ml infusion  potassium chloride 20 mEq in 50 ml IVPB  amiodarone (CORDARONE) 900 mg/250 ml D5W infusion  dexmedeTOMidine (PRECEDEX) 400 mcg in 0.9% sodium chloride 100 mL infusion  insulin regular (NOVOLIN R, HUMULIN R) 100 Units in 0.9% sodium chloride 100 mL infusion  PHENYLephrine (GREGOR-SYNEPHRINE) 20 mg in 0.9% sodium chloride 250 mL infusion  PHENYLephrine (GREGOR-SYNEPHRINE) 30 mg in 0.9% sodium chloride 250 mL infusion  niCARdipine (CARDENE) 25 mg in 0.9% sodium chloride 250 mL infusion  amiodarone (CORDARONE) 150 mg in dextrose 5% 100 mL bolus infusion  EPINEPHrine (ADRENALIN) 5 mg in 0.9% sodium chloride 250 mL infusion  NOREPINephrine (LEVOPHED) 4 mg in dextrose 5% 250 mL infusion  protamine injection 250 mg  
 desmopressin (DDAVP) 4 mcg/mL injection 2 mcg  cardioplegia infusion  aminocaproic acid (AMICAR) 15 g in 0.9% sodium chloride 150 mL infusion  nitroglycerin (NITROSTAT) tablet 0.4 mg  
 metoprolol tartrate (LOPRESSOR) tablet 25 mg  
 nitroglycerin (NITROBID) 2 % ointment 1 Inch  atorvastatin (LIPITOR) tablet 40 mg  
 aspirin delayed-release tablet 81 mg  
 sodium chloride (NS) flush 5-10 mL  sodium chloride (NS) flush 5-10 mL  acetaminophen (TYLENOL) solution 650 mg  
 zolpidem (AMBIEN) tablet 10 mg  
 0.9% sodium chloride infusion Vital Signs: Intake/Output: Intake/Output:  
Visit Vitals /75 (BP 1 Location: Left arm, BP Patient Position: At rest) Pulse 67 Temp 98.3 °F (36.8 °C) Resp 14 Ht 6' (1.829 m) Wt 85.2 kg (187 lb 13.3 oz) SpO2 94% BMI 25.47 kg/m² Temp (24hrs), Av.3 °F (36.8 °C), Min:98.1 °F (36.7 °C), Max:98.5 °F (36.9 °C) Telemetry:normal sinus rhythm; O2 Device: Room air Wt Readings from Last 4 Encounters:  
18 85.2 kg (187 lb 13.3 oz)  
10/02/18 85.8 kg (189 lb 2.5 oz) Intake/Output Summary (Last 24 hours) at 11/20/2018 3376 Last data filed at 11/20/2018 0800 Gross per 24 hour Intake 2031.67 ml Output 750 ml Net 1281.67 ml Last shift:      11/20 0701 - 11/20 1900 In: 500 [I.V.:500] Out: - Last 3 shifts: 11/18 1901 - 11/20 0700 In: 1531.7 [P.O.:360; I.V.:1171.7] Out: 750 [Urine:750] Physical Exam:  
 General: in no respiratory distress and acyanotic, alert and oriented times 3;   male HEAD: Normocephalic, without obvious abnormality, atraumatic EYES: conjunctivae clear. PERRL,  AN Icteric sclerae NOSE: nares normal, no drainage, no flaring, THROAT: normal; Lips, mucosa dry;  Oral hygiene; No Thrush;  
 Neck: Supple, symmetrical, trachea midline,  No accessory mm use; No Stridor/ cuff leak, No goiter or thyroid tenderness LYMPH: No abnormally enlarged lymph nodes. in neck or groin Chest: normal 
 Lungs: normal air entry, clear to ausculation, good air movement. Heart: Regular rate and rhythm ; NO edema Abdomen: soft, non-tender, without masses or organomegaly : NO  Almanzar, Extremity: negative, cyanosis, clubbing, Neuro: alert; verbal; conversant, good strength. Psych: oriented to time, place and person; No agitation; normal affect Skin: Skin unremarkable; ;  
 Pulses:Bilateral, Radial, 2+ Capillary refill: normal; warm, well perfused, 
 
Tubes: DATA: 
 
Labs: 
 
Recent Labs 11/19/18 
1603 11/18/18 
1702 WBC  --  12.4* HGB  --  15.1 PLT  --  311 INR 1.0  --   
APTT 26.4  --   
 
Recent Labs 11/18/18 
1702   
K 3.5  CO2 30 * BUN 13  
CREA 1.09  
CA 9.0 ALB 4.0  
SGOT 27 ALT 30 Recent Labs 11/19/18 
1715 PH 7.47* PCO2 35  
PO2 59* HCO3 25 Recent Labs 11/18/18 
1702 TROIQ 2.15* No results found for: BNPP, BNP No results found for: CULT Lab Results Component Value Date/Time TSH 0.76 11/19/2018 04:03 PM  
 
 
Imaging: Results from Hospital Encounter encounter on 11/18/18 XR CHEST PA LAT Narrative INDICATION:   chest pain with left arm numbness EXAM:  PA and Lateral Chest Radiographs COMPARISON: None FINDINGS: 
 
PA and lateral views of the chest demonstrate a normal cardiomediastinal 
silhouette. The lungs are adequately expanded. There is no edema, effusion, 
consolidation, or pneumothorax. The osseous structures are unremarkable. Impression IMPRESSION: 
No acute process. Results from Hospital Encounter encounter on 10/02/18 CT ABD PELV W CONT Narrative EXAM: CT ABDOMEN PELVIS WITH CONTRAST INDICATION: Right lower quadrant pain. . 
COMPARISON: None. CONTRAST: 100 mL of Isovue-370. TECHNIQUE:  
Multislice helical CT was performed from the diaphragm to the symphysis pubis 
during uneventful rapid bolus intravenous contrast administration. Oral contrast 
was not administered. Contiguous 5 mm axial images were reconstructed and lung 
and soft tissue windows were generated. Coronal and sagittal reformations were 
generated. CT dose reduction was achieved through use of a standardized protocol 
tailored for this examination and automatic exposure control for dose 
modulation. FINDINGS: 
LOWER CHEST: The visualized portions of the lung bases are clear. ABDOMEN: 
Liver: The liver is normal in size and contour with no focal abnormality. Gallbladder and bile ducts: There is no calcified gallstone or biliary 
dilatation. Spleen: No abnormality. Pancreas: No abnormality. Adrenal glands: No abnormality. Kidneys: The right kidney is enlarged with perinephric stranding and there is 
hydronephrosis and hydroureter down to the level of the 2 mm stone at the right 
ureterovesical junction. There is also perirenal stranding. There is no stone or 
other abnormality on the left. PELVIS: 
Reproductive organs: The prostate gland and seminal vesicles are symmetrical.  
Bladder: No abnormality. BOWEL AND MESENTERY: The small bowel is normal.  There is no mesenteric mass or 
adenopathy. The appendix is normal. 
PERITONEUM: There is no ascites or free intraperitoneal air. RETROPERITONEUM: The aorta  tapers without aneurysm. There is no retroperitoneal 
adenopathy or mass. There is no pelvic mass or adenopathy. BONES AND SOFT TISSUES: The bones and soft tissues of the abdominal wall are 
within normal limits. Impression IMPRESSION: 2 mm right ureterovesical junction calculus with obstruction, 
hydronephrosis, hydroureter and perinephric and periureteral stranding. Karl Balke This care involved high complexity medical decision making: I personally: · Reviewed the flowsheet and previous days notes · Reviewed and summarized records or history from previous days note or discussions with staff, family · Parenteral controlled substances - Reviewed/ Adjusted / Weaned / Started · High Risk Drug therapy requiring intensive monitoring for toxicity: eg steroids, pressors, antibiotics · Reviewed and/or ordered Clinical lab tests · Reviewed and/or ordered Radiology tests · Reviewed and/or ordered of Medicine tests · Independently visualized radiologic Images · Reviewed the patients ECG / Telemetry My assessment/management discussed with: Cardiothoracic surgery, Nursing, Pharmacy, Case Management, PT, OT, Respiratory Therapy, Nutrition, Diabetes specialist and Family for coordination of care Pt's condition is acute and unstable requiring inpatient hospitalization. This care involved high complexity decision making in order to assess, support vital system function, and to treat this degree of vital organ system failure, and to prevent further deterioration of the patients condition.   
 
Justus Romberg, MD

## 2018-11-20 NOTE — PROGRESS NOTES
Problem: Falls - Risk of 
Goal: *Absence of Falls Document Ryann [facility] Fall Risk and appropriate interventions in the flowsheet. Outcome: Progressing Towards Goal 
Fall Risk Interventions: 
 
Medication Interventions: Evaluate medications/consider consulting pharmacy, Patient to call before getting OOB Elimination Interventions: Call light in reach, Toileting schedule/hourly rounds

## 2018-11-20 NOTE — ANESTHESIA PROCEDURE NOTES
Central Line Placement Start time: 11/20/2018 11:35 AM 
End time: 11/20/2018 11:50 AM 
Performed by: Justin Hough MD 
Authorized by: Justin Hough MD  
 
Indications: vascular access, central pressure monitoring and need for vasopressors Preanesthetic Checklist: patient identified, risks and benefits discussed, anesthesia consent, patient being monitored and timeout performed Pre-procedure: All elements of maximal sterile barrier technique followed? Yes   
2% Chlorhexidine for cutaneous antisepsis, Hand hygiene performed prior to catheter insertion and Ultrasound guidance Sterile Ultrasound Technique followed?: Yes Procedure:  
Prep:  Chlorhexidine Location:  Internal jugular Orientation:  Right Patient position:  Trendelenburg Catheter type:  Single lumen Catheter size:  9 Fr Catheter length:  16 cm Number of attempts:  1 Successful placement: Yes Assessment:  
Post-procedure:  Catheter secured and sterile dressing applied Assessment:  Blood return through all ports Insertion:  Uncomplicated Patient tolerance:  Patient tolerated the procedure well with no immediate complications 8fr CCO PAC

## 2018-11-20 NOTE — INTERDISCIPLINARY ROUNDS
Critical care interdisciplinary rounds held on 11/20/18. Following members present, Primary Nurse, Intensivist, Pharmacy, Diabetes Treatment, Case Management, Occupational Therapy, Physical Therapy,  and Nutrition. Plan of care discussed.   See clinical pathway fo plan of care and interventions and desired outcomes.

## 2018-11-20 NOTE — ANESTHESIA PROCEDURE NOTES
DM 
 
 
 
Procedure Details: probe placement, image aquisition & interpretation Procedure Note Performed by: Estil Gitelman, MD 
Authorized by: Estil Gitelman, MD  
 
 
Indications: assessment of ascending aorta Modalities: 2D, CF, CWD, PWD Probe Type: multiplane and epiaortic Insertion: atraumatic Echocardiographic and Doppler Measurements Aorta  Size  Diam(cm)  Dissection PlaqueThick(mm)  Plaque Mobile Ascending normal  No 0-3 No  
 Arch normal  No 0-3 No  
 Descending normal  No 0-3 No  
 
 
 
 Valves  Annulus  Stenosis  Area/Grad  Regurg  Leaflet Morph  Leaflet Motion Aortic normal none  0 normal normal  
 Mitral normal none  1+ normal normal  
 Tricuspid normal none  1+ normal normal  
 
 
 
 Atria  Size  SEC (smoke)  Thrombus  Tumor  Device Rt Atrium normal No No No Yes Lt Atrium normal No No No No  
 
Interatrial Septum Morphology: normal 
 
Interventricular Septum Morphology: normal 
 
Ventricle  Cavity Size  Cavity Dimension Hypertrophy  Thrombus  Gloal FXN  EF  
 RV dilated  No no normal   
 LV dilated   No normal 55 Regional Function 
(1 = normal, 2 = mildly hypokinetic, 3 = severely hypokinetic, 4 = akinetic, 5 = dyskinetic) LAV - [place] ME LAV = 0  ME LAV = 90  ME LAV = 130 Pericardium: normal 
 
Post Intervention Follow-up Study Ventricular Global Function: unchanged Ventricular Regional Function: unchanged Valve  Function  Regurgitation  Area Aortic no change 0 Mitral no change 0 Tricuspid no change 0 Prosthetic

## 2018-11-20 NOTE — PERIOP NOTES
TRANSFER - OUT REPORT: 
 
Verbal report given to KAYLEE Daniels RN on Christine Carlton  being transferred to CCU for routine post - op Report consisted of patients Situation, Background, Assessment and  
Recommendations(SBAR). Information from the following report(s) SBAR, OR Summary, Procedure Summary and MAR was reviewed with the receiving nurse. Lines:  
Single Lumen Venous Catheter 11/20/18 Right Internal jugular (Active) Quad Lumen 11/20/18 Right Internal jugular (Active) Peripheral IV 11/18/18 Right Antecubital (Active) Site Assessment Clean, dry, & intact 11/20/2018 11:35 AM  
Phlebitis Assessment 0 11/20/2018 11:35 AM  
Infiltration Assessment 0 11/20/2018 11:35 AM  
Dressing Status Clean, dry, & intact 11/20/2018 11:35 AM  
Dressing Type Tape;Transparent 11/20/2018 11:35 AM  
Hub Color/Line Status Pink; Infusing 11/20/2018 11:35 AM  
   
Arterial Line 11/20/18 Right Radial artery (Active) Opportunity for questions and clarification was provided. Patient transported with: 
 Monitor O2 @ 10 liters Registered Nurse Perfussion CRNA

## 2018-11-20 NOTE — PERIOP NOTES
TRANSFER - IN REPORT: 
 
Verbal report received from [facility] RN(name) on Jass Medin  being received from 2540(unit) for ordered procedure Report consisted of patients Situation, Background, Assessment and  
Recommendations(SBAR). Information from the following report(s) SBAR, Kardex, Intake/Output, MAR, Recent Results, Med Rec Status and Pre Procedure Checklist was reviewed with the receiving nurse. Opportunity for questions and clarification was provided. Assessment completed upon patients arrival to unit and care assumed. 1300 Pt resting with eyes closed. 1400 Pt resting with eyes closed, vital signs stable. 1440 Wife updated pt in OR. States was aware due to status board.

## 2018-11-21 ENCOUNTER — APPOINTMENT (OUTPATIENT)
Dept: GENERAL RADIOLOGY | Age: 53
DRG: 234 | End: 2018-11-21
Attending: PHYSICIAN ASSISTANT
Payer: COMMERCIAL

## 2018-11-21 PROBLEM — Z95.1 S/P CABG X 2: Status: ACTIVE | Noted: 2018-11-21

## 2018-11-21 LAB
ADMINISTERED INITIALS, ADMINIT: NORMAL
ALBUMIN SERPL-MCNC: 3.6 G/DL (ref 3.5–5)
ALBUMIN SERPL-MCNC: 3.7 G/DL (ref 3.5–5)
ALBUMIN/GLOB SERPL: 1.6 {RATIO} (ref 1.1–2.2)
ALBUMIN/GLOB SERPL: 1.7 {RATIO} (ref 1.1–2.2)
ALP SERPL-CCNC: 47 U/L (ref 45–117)
ALP SERPL-CCNC: 56 U/L (ref 45–117)
ALT SERPL-CCNC: 20 U/L (ref 12–78)
ALT SERPL-CCNC: 20 U/L (ref 12–78)
ANION GAP SERPL CALC-SCNC: 5 MMOL/L (ref 5–15)
ANION GAP SERPL CALC-SCNC: 6 MMOL/L (ref 5–15)
AST SERPL-CCNC: 24 U/L (ref 15–37)
AST SERPL-CCNC: 26 U/L (ref 15–37)
BACTERIA SPEC CULT: NORMAL
BILIRUB SERPL-MCNC: 0.9 MG/DL (ref 0.2–1)
BILIRUB SERPL-MCNC: 1 MG/DL (ref 0.2–1)
BUN SERPL-MCNC: 11 MG/DL (ref 6–20)
BUN SERPL-MCNC: 12 MG/DL (ref 6–20)
BUN/CREAT SERPL: 12 (ref 12–20)
BUN/CREAT SERPL: 13 (ref 12–20)
CALCIUM SERPL-MCNC: 7.8 MG/DL (ref 8.5–10.1)
CALCIUM SERPL-MCNC: 8.1 MG/DL (ref 8.5–10.1)
CC UR VC: NORMAL
CHLORIDE SERPL-SCNC: 115 MMOL/L (ref 97–108)
CHLORIDE SERPL-SCNC: 115 MMOL/L (ref 97–108)
CO2 SERPL-SCNC: 23 MMOL/L (ref 21–32)
CO2 SERPL-SCNC: 26 MMOL/L (ref 21–32)
CREAT SERPL-MCNC: 0.94 MG/DL (ref 0.7–1.3)
CREAT SERPL-MCNC: 0.95 MG/DL (ref 0.7–1.3)
D50 ADMINISTERED, D50ADM: 0 ML
D50 ORDER, D50ORD: 0 ML
ERYTHROCYTE [DISTWIDTH] IN BLOOD BY AUTOMATED COUNT: 13.6 % (ref 11.5–14.5)
GLOBULIN SER CALC-MCNC: 2.2 G/DL (ref 2–4)
GLOBULIN SER CALC-MCNC: 2.3 G/DL (ref 2–4)
GLSCOM COMMENTS: NORMAL
GLUCOSE BLD STRIP.AUTO-MCNC: 100 MG/DL (ref 65–100)
GLUCOSE BLD STRIP.AUTO-MCNC: 101 MG/DL (ref 65–100)
GLUCOSE BLD STRIP.AUTO-MCNC: 102 MG/DL (ref 65–100)
GLUCOSE BLD STRIP.AUTO-MCNC: 102 MG/DL (ref 65–100)
GLUCOSE BLD STRIP.AUTO-MCNC: 104 MG/DL (ref 65–100)
GLUCOSE BLD STRIP.AUTO-MCNC: 106 MG/DL (ref 65–100)
GLUCOSE BLD STRIP.AUTO-MCNC: 115 MG/DL (ref 65–100)
GLUCOSE BLD STRIP.AUTO-MCNC: 118 MG/DL (ref 65–100)
GLUCOSE BLD STRIP.AUTO-MCNC: 121 MG/DL (ref 65–100)
GLUCOSE BLD STRIP.AUTO-MCNC: 124 MG/DL (ref 65–100)
GLUCOSE BLD STRIP.AUTO-MCNC: 82 MG/DL (ref 65–100)
GLUCOSE BLD STRIP.AUTO-MCNC: 88 MG/DL (ref 65–100)
GLUCOSE BLD STRIP.AUTO-MCNC: 89 MG/DL (ref 65–100)
GLUCOSE BLD STRIP.AUTO-MCNC: 91 MG/DL (ref 65–100)
GLUCOSE BLD STRIP.AUTO-MCNC: 91 MG/DL (ref 65–100)
GLUCOSE BLD STRIP.AUTO-MCNC: 93 MG/DL (ref 65–100)
GLUCOSE BLD STRIP.AUTO-MCNC: 95 MG/DL (ref 65–100)
GLUCOSE BLD STRIP.AUTO-MCNC: 97 MG/DL (ref 65–100)
GLUCOSE BLD STRIP.AUTO-MCNC: 97 MG/DL (ref 65–100)
GLUCOSE SERPL-MCNC: 88 MG/DL (ref 65–100)
GLUCOSE SERPL-MCNC: 95 MG/DL (ref 65–100)
GLUCOSE, GLC: 100 MG/DL
GLUCOSE, GLC: 101 MG/DL
GLUCOSE, GLC: 102 MG/DL
GLUCOSE, GLC: 102 MG/DL
GLUCOSE, GLC: 104 MG/DL
GLUCOSE, GLC: 106 MG/DL
GLUCOSE, GLC: 115 MG/DL
GLUCOSE, GLC: 118 MG/DL
GLUCOSE, GLC: 121 MG/DL
GLUCOSE, GLC: 82 MG/DL
GLUCOSE, GLC: 88 MG/DL
GLUCOSE, GLC: 89 MG/DL
GLUCOSE, GLC: 91 MG/DL
GLUCOSE, GLC: 91 MG/DL
GLUCOSE, GLC: 93 MG/DL
GLUCOSE, GLC: 95 MG/DL
GLUCOSE, GLC: 97 MG/DL
GLUCOSE, GLC: 97 MG/DL
HCT VFR BLD AUTO: 31.2 % (ref 36.6–50.3)
HCT VFR BLD AUTO: 34.2 % (ref 36.6–50.3)
HGB BLD-MCNC: 10.5 G/DL (ref 12.1–17)
HGB BLD-MCNC: 11.5 G/DL (ref 12.1–17)
HIGH TARGET, HITG: 130 MG/DL
INSULIN ADMINSTERED, INSADM: 0 UNITS/HOUR
INSULIN ADMINSTERED, INSADM: 0.3 UNITS/HOUR
INSULIN ADMINSTERED, INSADM: 0.6 UNITS/HOUR
INSULIN ADMINSTERED, INSADM: 0.8 UNITS/HOUR
INSULIN ADMINSTERED, INSADM: 0.8 UNITS/HOUR
INSULIN ADMINSTERED, INSADM: 1.1 UNITS/HOUR
INSULIN ADMINSTERED, INSADM: 1.2 UNITS/HOUR
INSULIN ADMINSTERED, INSADM: 1.3 UNITS/HOUR
INSULIN ADMINSTERED, INSADM: 1.4 UNITS/HOUR
INSULIN ADMINSTERED, INSADM: 1.5 UNITS/HOUR
INSULIN ADMINSTERED, INSADM: 1.6 UNITS/HOUR
INSULIN ADMINSTERED, INSADM: 1.7 UNITS/HOUR
INSULIN ADMINSTERED, INSADM: 2.4 UNITS/HOUR
INSULIN ORDER, INSORD: 0 UNITS/HOUR
INSULIN ORDER, INSORD: 0.3 UNITS/HOUR
INSULIN ORDER, INSORD: 0.6 UNITS/HOUR
INSULIN ORDER, INSORD: 0.8 UNITS/HOUR
INSULIN ORDER, INSORD: 0.8 UNITS/HOUR
INSULIN ORDER, INSORD: 1.1 UNITS/HOUR
INSULIN ORDER, INSORD: 1.2 UNITS/HOUR
INSULIN ORDER, INSORD: 1.3 UNITS/HOUR
INSULIN ORDER, INSORD: 1.4 UNITS/HOUR
INSULIN ORDER, INSORD: 1.5 UNITS/HOUR
INSULIN ORDER, INSORD: 1.6 UNITS/HOUR
INSULIN ORDER, INSORD: 1.7 UNITS/HOUR
INSULIN ORDER, INSORD: 2.4 UNITS/HOUR
LOW TARGET, LOT: 95 MG/DL
MAGNESIUM SERPL-MCNC: 1.9 MG/DL (ref 1.6–2.4)
MAGNESIUM SERPL-MCNC: 2.1 MG/DL (ref 1.6–2.4)
MCH RBC QN AUTO: 30.2 PG (ref 26–34)
MCHC RBC AUTO-ENTMCNC: 33.7 G/DL (ref 30–36.5)
MCV RBC AUTO: 89.7 FL (ref 80–99)
MINUTES UNTIL NEXT BG, NBG: 60 MIN
MULTIPLIER, MUL: 0
MULTIPLIER, MUL: 0.01
MULTIPLIER, MUL: 0.02
MULTIPLIER, MUL: 0.02
MULTIPLIER, MUL: 0.03
MULTIPLIER, MUL: 0.04
NRBC # BLD: 0 K/UL (ref 0–0.01)
NRBC BLD-RTO: 0 PER 100 WBC
ORDER INITIALS, ORDINIT: NORMAL
PLATELET # BLD AUTO: 178 K/UL (ref 150–400)
PMV BLD AUTO: 9.9 FL (ref 8.9–12.9)
POTASSIUM SERPL-SCNC: 3.9 MMOL/L (ref 3.5–5.1)
POTASSIUM SERPL-SCNC: 4.1 MMOL/L (ref 3.5–5.1)
PROT SERPL-MCNC: 5.9 G/DL (ref 6.4–8.2)
PROT SERPL-MCNC: 5.9 G/DL (ref 6.4–8.2)
RBC # BLD AUTO: 3.48 M/UL (ref 4.1–5.7)
SERVICE CMNT-IMP: ABNORMAL
SERVICE CMNT-IMP: NORMAL
SODIUM SERPL-SCNC: 144 MMOL/L (ref 136–145)
SODIUM SERPL-SCNC: 146 MMOL/L (ref 136–145)
WBC # BLD AUTO: 16.7 K/UL (ref 4.1–11.1)

## 2018-11-21 PROCEDURE — G8988 SELF CARE GOAL STATUS: HCPCS | Performed by: OCCUPATIONAL THERAPIST

## 2018-11-21 PROCEDURE — 97161 PT EVAL LOW COMPLEX 20 MIN: CPT

## 2018-11-21 PROCEDURE — 77010033678 HC OXYGEN DAILY

## 2018-11-21 PROCEDURE — 74011250637 HC RX REV CODE- 250/637: Performed by: INTERNAL MEDICINE

## 2018-11-21 PROCEDURE — 74011636637 HC RX REV CODE- 636/637: Performed by: PHYSICIAN ASSISTANT

## 2018-11-21 PROCEDURE — 74011000250 HC RX REV CODE- 250: Performed by: THORACIC SURGERY (CARDIOTHORACIC VASCULAR SURGERY)

## 2018-11-21 PROCEDURE — 97535 SELF CARE MNGMENT TRAINING: CPT | Performed by: OCCUPATIONAL THERAPIST

## 2018-11-21 PROCEDURE — 82962 GLUCOSE BLOOD TEST: CPT

## 2018-11-21 PROCEDURE — 65660000000 HC RM CCU STEPDOWN

## 2018-11-21 PROCEDURE — 80053 COMPREHEN METABOLIC PANEL: CPT

## 2018-11-21 PROCEDURE — 74011000250 HC RX REV CODE- 250: Performed by: PHYSICIAN ASSISTANT

## 2018-11-21 PROCEDURE — P9045 ALBUMIN (HUMAN), 5%, 250 ML: HCPCS | Performed by: PHYSICIAN ASSISTANT

## 2018-11-21 PROCEDURE — 97165 OT EVAL LOW COMPLEX 30 MIN: CPT | Performed by: OCCUPATIONAL THERAPIST

## 2018-11-21 PROCEDURE — G8987 SELF CARE CURRENT STATUS: HCPCS | Performed by: OCCUPATIONAL THERAPIST

## 2018-11-21 PROCEDURE — 36415 COLL VENOUS BLD VENIPUNCTURE: CPT

## 2018-11-21 PROCEDURE — 74011250637 HC RX REV CODE- 250/637: Performed by: PHYSICIAN ASSISTANT

## 2018-11-21 PROCEDURE — 74011250636 HC RX REV CODE- 250/636

## 2018-11-21 PROCEDURE — 74011250636 HC RX REV CODE- 250/636: Performed by: THORACIC SURGERY (CARDIOTHORACIC VASCULAR SURGERY)

## 2018-11-21 PROCEDURE — 74011000258 HC RX REV CODE- 258: Performed by: THORACIC SURGERY (CARDIOTHORACIC VASCULAR SURGERY)

## 2018-11-21 PROCEDURE — 71045 X-RAY EXAM CHEST 1 VIEW: CPT

## 2018-11-21 PROCEDURE — 97530 THERAPEUTIC ACTIVITIES: CPT

## 2018-11-21 PROCEDURE — 83735 ASSAY OF MAGNESIUM: CPT

## 2018-11-21 PROCEDURE — 74011000258 HC RX REV CODE- 258: Performed by: PHYSICIAN ASSISTANT

## 2018-11-21 PROCEDURE — 85027 COMPLETE CBC AUTOMATED: CPT

## 2018-11-21 PROCEDURE — 74011250636 HC RX REV CODE- 250/636: Performed by: PHYSICIAN ASSISTANT

## 2018-11-21 RX ORDER — ONDANSETRON 2 MG/ML
4 INJECTION INTRAMUSCULAR; INTRAVENOUS
Status: DISCONTINUED | OUTPATIENT
Start: 2018-11-21 | End: 2018-11-25 | Stop reason: HOSPADM

## 2018-11-21 RX ORDER — MAG HYDROX/ALUMINUM HYD/SIMETH 200-200-20
30 SUSPENSION, ORAL (FINAL DOSE FORM) ORAL
Status: DISCONTINUED | OUTPATIENT
Start: 2018-11-21 | End: 2018-11-25 | Stop reason: HOSPADM

## 2018-11-21 RX ORDER — METOPROLOL TARTRATE 25 MG/1
12.5 TABLET, FILM COATED ORAL EVERY 12 HOURS
Status: DISCONTINUED | OUTPATIENT
Start: 2018-11-22 | End: 2018-11-23

## 2018-11-21 RX ORDER — ZOLPIDEM TARTRATE 5 MG/1
5 TABLET ORAL
Status: DISCONTINUED | OUTPATIENT
Start: 2018-11-21 | End: 2018-11-25 | Stop reason: HOSPADM

## 2018-11-21 RX ORDER — SODIUM CHLORIDE 0.9 % (FLUSH) 0.9 %
5-10 SYRINGE (ML) INJECTION AS NEEDED
Status: DISCONTINUED | OUTPATIENT
Start: 2018-11-21 | End: 2018-11-25 | Stop reason: HOSPADM

## 2018-11-21 RX ORDER — POTASSIUM CHLORIDE 29.8 MG/ML
INJECTION INTRAVENOUS
Status: COMPLETED
Start: 2018-11-21 | End: 2018-11-21

## 2018-11-21 RX ORDER — POTASSIUM CHLORIDE 750 MG/1
20 TABLET, FILM COATED, EXTENDED RELEASE ORAL DAILY
Status: DISCONTINUED | OUTPATIENT
Start: 2018-11-22 | End: 2018-11-25 | Stop reason: HOSPADM

## 2018-11-21 RX ORDER — SODIUM CHLORIDE 0.9 % (FLUSH) 0.9 %
5-10 SYRINGE (ML) INJECTION EVERY 8 HOURS
Status: DISCONTINUED | OUTPATIENT
Start: 2018-11-21 | End: 2018-11-23

## 2018-11-21 RX ORDER — ACETAMINOPHEN 325 MG/1
650 TABLET ORAL
Status: DISCONTINUED | OUTPATIENT
Start: 2018-11-21 | End: 2018-11-25 | Stop reason: HOSPADM

## 2018-11-21 RX ORDER — MAGNESIUM SULFATE 1 G/100ML
INJECTION INTRAVENOUS
Status: COMPLETED
Start: 2018-11-21 | End: 2018-11-21

## 2018-11-21 RX ORDER — ONDANSETRON 2 MG/ML
8 INJECTION INTRAMUSCULAR; INTRAVENOUS
Status: DISCONTINUED | OUTPATIENT
Start: 2018-11-21 | End: 2018-11-23 | Stop reason: SDUPTHER

## 2018-11-21 RX ORDER — FUROSEMIDE 40 MG/1
40 TABLET ORAL DAILY
Status: DISCONTINUED | OUTPATIENT
Start: 2018-11-22 | End: 2018-11-25 | Stop reason: HOSPADM

## 2018-11-21 RX ORDER — ONDANSETRON 2 MG/ML
INJECTION INTRAMUSCULAR; INTRAVENOUS
Status: COMPLETED
Start: 2018-11-21 | End: 2018-11-21

## 2018-11-21 RX ORDER — NALOXONE HYDROCHLORIDE 0.4 MG/ML
0.4 INJECTION, SOLUTION INTRAMUSCULAR; INTRAVENOUS; SUBCUTANEOUS AS NEEDED
Status: DISCONTINUED | OUTPATIENT
Start: 2018-11-21 | End: 2018-11-25 | Stop reason: HOSPADM

## 2018-11-21 RX ADMIN — Medication 400 MG: at 08:41

## 2018-11-21 RX ADMIN — ACETAMINOPHEN 1000 MG: 10 INJECTION, SOLUTION INTRAVENOUS at 08:42

## 2018-11-21 RX ADMIN — SODIUM CHLORIDE 1.6 UNITS/HR: 900 INJECTION, SOLUTION INTRAVENOUS at 02:00

## 2018-11-21 RX ADMIN — CHLORHEXIDINE GLUCONATE 15 ML: 1.2 RINSE ORAL at 10:04

## 2018-11-21 RX ADMIN — ALBUMIN (HUMAN) 12.5 G: 12.5 INJECTION, SOLUTION INTRAVENOUS at 02:05

## 2018-11-21 RX ADMIN — ATORVASTATIN CALCIUM 40 MG: 40 TABLET, FILM COATED ORAL at 08:41

## 2018-11-21 RX ADMIN — Medication 10 ML: at 05:11

## 2018-11-21 RX ADMIN — Medication 400 MG: at 18:44

## 2018-11-21 RX ADMIN — CALCIUM CHLORIDE 1 G: 100 INJECTION, SOLUTION INTRAVENOUS at 01:30

## 2018-11-21 RX ADMIN — MORPHINE SULFATE 2 MG: 2 INJECTION, SOLUTION INTRAMUSCULAR; INTRAVENOUS at 01:45

## 2018-11-21 RX ADMIN — ALBUMIN (HUMAN) 12.5 G: 12.5 INJECTION, SOLUTION INTRAVENOUS at 00:02

## 2018-11-21 RX ADMIN — AMIODARONE HYDROCHLORIDE 0.5 MG/MIN: 50 INJECTION, SOLUTION INTRAVENOUS at 07:52

## 2018-11-21 RX ADMIN — AMIODARONE HYDROCHLORIDE 400 MG: 200 TABLET ORAL at 08:41

## 2018-11-21 RX ADMIN — OXYCODONE HYDROCHLORIDE AND ACETAMINOPHEN 1000 MG: 500 TABLET ORAL at 08:41

## 2018-11-21 RX ADMIN — Medication 10 ML: at 18:44

## 2018-11-21 RX ADMIN — ACETAMINOPHEN 1000 MG: 10 INJECTION, SOLUTION INTRAVENOUS at 14:14

## 2018-11-21 RX ADMIN — MORPHINE SULFATE 1 MG: 2 INJECTION, SOLUTION INTRAMUSCULAR; INTRAVENOUS at 06:15

## 2018-11-21 RX ADMIN — Medication 2 G: at 20:45

## 2018-11-21 RX ADMIN — Medication 10 ML: at 16:46

## 2018-11-21 RX ADMIN — ALBUMIN (HUMAN) 12.5 G: 12.5 INJECTION, SOLUTION INTRAVENOUS at 05:17

## 2018-11-21 RX ADMIN — PANTOPRAZOLE SODIUM 40 MG: 40 TABLET, DELAYED RELEASE ORAL at 08:41

## 2018-11-21 RX ADMIN — MORPHINE SULFATE 2 MG: 2 INJECTION, SOLUTION INTRAMUSCULAR; INTRAVENOUS at 14:14

## 2018-11-21 RX ADMIN — ONDANSETRON 4 MG: 2 INJECTION INTRAMUSCULAR; INTRAVENOUS at 14:20

## 2018-11-21 RX ADMIN — ASPIRIN 81 MG: 81 TABLET, COATED ORAL at 08:41

## 2018-11-21 RX ADMIN — ALBUMIN (HUMAN) 12.5 G: 12.5 INJECTION, SOLUTION INTRAVENOUS at 05:55

## 2018-11-21 RX ADMIN — DEXMEDETOMIDINE HYDROCHLORIDE 0.2 MCG/KG/HR: 100 INJECTION, SOLUTION INTRAVENOUS at 02:00

## 2018-11-21 RX ADMIN — POLYETHYLENE GLYCOL 3350 34 G: 17 POWDER, FOR SOLUTION ORAL at 08:46

## 2018-11-21 RX ADMIN — CHLORHEXIDINE GLUCONATE 15 ML: 1.2 RINSE ORAL at 20:50

## 2018-11-21 RX ADMIN — OXYCODONE HYDROCHLORIDE 5 MG: 5 TABLET ORAL at 04:16

## 2018-11-21 RX ADMIN — MAGNESIUM SULFATE HEPTAHYDRATE 1 G: 1 INJECTION, SOLUTION INTRAVENOUS at 01:30

## 2018-11-21 RX ADMIN — Medication 2 G: at 14:28

## 2018-11-21 RX ADMIN — Medication 10 ML: at 20:45

## 2018-11-21 RX ADMIN — ALBUMIN (HUMAN) 12.5 G: 12.5 INJECTION, SOLUTION INTRAVENOUS at 07:53

## 2018-11-21 RX ADMIN — ALBUMIN (HUMAN) 12.5 G: 12.5 INJECTION, SOLUTION INTRAVENOUS at 03:58

## 2018-11-21 RX ADMIN — SODIUM CHLORIDE 75 ML/HR: 900 INJECTION, SOLUTION INTRAVENOUS at 05:17

## 2018-11-21 RX ADMIN — SENNOSIDES AND DOCUSATE SODIUM 1 TABLET: 8.6; 5 TABLET ORAL at 08:41

## 2018-11-21 RX ADMIN — ONDANSETRON 4 MG: 2 INJECTION INTRAMUSCULAR; INTRAVENOUS at 16:20

## 2018-11-21 RX ADMIN — ALBUMIN (HUMAN) 12.5 G: 12.5 INJECTION, SOLUTION INTRAVENOUS at 04:42

## 2018-11-21 RX ADMIN — OXYCODONE HYDROCHLORIDE 5 MG: 5 TABLET ORAL at 18:44

## 2018-11-21 RX ADMIN — ACETAMINOPHEN 1000 MG: 10 INJECTION, SOLUTION INTRAVENOUS at 01:30

## 2018-11-21 RX ADMIN — OXYCODONE HYDROCHLORIDE 10 MG: 5 TABLET ORAL at 08:41

## 2018-11-21 RX ADMIN — ACETAMINOPHEN 650 MG: 325 TABLET ORAL at 22:28

## 2018-11-21 RX ADMIN — ALBUMIN (HUMAN) 12.5 G: 12.5 INJECTION, SOLUTION INTRAVENOUS at 12:32

## 2018-11-21 RX ADMIN — Medication 2 G: at 07:10

## 2018-11-21 RX ADMIN — SODIUM CHLORIDE 2.4 UNITS/HR: 900 INJECTION, SOLUTION INTRAVENOUS at 03:00

## 2018-11-21 RX ADMIN — ONDANSETRON 4 MG: 2 INJECTION INTRAMUSCULAR; INTRAVENOUS at 07:17

## 2018-11-21 RX ADMIN — Medication 2 G: at 00:47

## 2018-11-21 RX ADMIN — OXYCODONE HYDROCHLORIDE 10 MG: 5 TABLET ORAL at 21:26

## 2018-11-21 RX ADMIN — POTASSIUM CHLORIDE 20 MEQ: 400 INJECTION, SOLUTION INTRAVENOUS at 02:00

## 2018-11-21 NOTE — PROGRESS NOTES
Patient evaluated by physical therapy; full note to follow. Luann Adam, SPT Vitals:  
 11/21/18 1100 11/21/18 1143 11/21/18 1144 11/21/18 1146 BP: 110/68 115/71 107/49 119/77 BP 1 Location:  Left arm Left arm Left arm BP Patient Position:  Supine Sitting Sitting Pulse: 86  86 Resp: 18 Temp:      
SpO2: 98% Weight:      
Height:

## 2018-11-21 NOTE — DIABETES MGMT
DTC Cardiac Surgery Progress Note Recommendations/ Comments:  Pt arrived to CCU at [facility] - EAT 11/20/18. Consider continuing insulin gtt for at least 48hrs post-op and eating 50% solid foods then, 
1) transition off gtt per [employer] Protocol 2) continue accu-checks and humalog correctional insulin ac & hs until pt has 3 consecutive BG <150mg/dl off gtt then discontinue Insulin gtt should not be stopped until after 1930 11/22/18 to complete 48hr post-op time frame. Pt could receive transition as early as 1730 if all criteria met per protocol. Currently on insulin gtt. Chart reviewed on Luna Mandujano. Patient is 46 y.o. male s/p Cardiac surgery  POD 1. No history of DM. A1c suggestive of pre-DM A1c:  
Lab Results Component Value Date/Time Hemoglobin A1c 5.8 11/19/2018 04:03 PM  
 
 
 
Recent Glucose Results:  
Lab Results Component Value Date/Time GLU 88 11/21/2018 04:20 AM  
 GLU 95 11/20/2018 11:35 PM  
  (H) 11/20/2018 07:43 PM  
 GLUCPOC 102 (H) 11/21/2018 12:34 PM  
 GLUCPOC 91 11/21/2018 11:24 AM  
 GLUCPOC 97 11/21/2018 10:21 AM  
  
 
Lab Results Component Value Date/Time Creatinine 0.95 11/21/2018 04:20 AM  
 
Estimated Creatinine Clearance: 97.6 mL/min (based on SCr of 0.95 mg/dL). Active Orders Diet DIET NPO  
  
 
PO intake:  
Patient Vitals for the past 72 hrs: 
 % Diet Eaten 11/19/18 1816 100 % Will continue to follow as needed. Thank you. Hamida Trent RD, CDE [facility] Office:  [phone]

## 2018-11-21 NOTE — PROGRESS NOTES
Cardiac Surgery ICU Progress Note Admit Date: 11/18/2018 POD: 1 Day Post-Op Problems:  Principal Problem: 
  NSTEMI (non-ST elevation myocardial infarction) ([address] (11/18/2018) Active Problems: 
  Mixed hyperlipidemia (11/18/2018) Tobacco use disorder (11/18/2018) CAD (coronary artery disease), native coronary artery (11/19/2018) CAD (coronary artery disease) (11/20/2018) Procedure:  Procedure(s): CORONARY ARTERY BYPASS GRAFT TIMES TWO  WITH RIGHT SAPHENOPUS VEIN GRAFT ( ENDOSCOPIC HARVEST ) AND LEFT INTERNAL MAMMARY ARTERY WITH EXTRA CORPOREAL CIRCULATION. DM AND EPIAORTIC ULTRASOUND DONE BY DR Oumou Patel Summary:  
 
Stable hemodynamics in sinus rhythm without ectopy on no support. CI > 2 SVO2 60% Timur 20 with volume support WBC 16 HH 10/31  CXR: minimal basilar atelectasis. No pneumothorax : 430/12 hrs Cr   UOP 1630 +2300 Plan/Recommendations/Medical Decision Making:  
 
Volume to wean Timur off Transfer to stepdown DC central lines and kilgore Meds: aspirin/statin B-Blocker held for bradycardia ACE held for hypotension Anticipated acute blood loss anemia Increase activity Increase I/S effort and frequency Sternal precautions Stimulate bowel movement Patient seen and reviewed with  Dr. Archie Helms MD 
 
 
 Objective:  
 
Extubation Date / Time: CXR Results  (Last 48 hours)  
          
 11/21/18 0525  XR CHEST PORT Final result Impression:  IMPRESSION:  
1. There is minimal basilar atelectasis. No pneumothorax. Narrative:  EXAM:  XR CHEST PORT INDICATION:  Post op heart surgery, myocardial infarction COMPARISON: 11/20/2018 at [address] [place] [address] FINDINGS: A portable AP radiograph of the chest was obtained at 0500 hours. The  
patient is on a cardiac monitor. The ET tube has been removed. Right IJ catheter  
and Rockledge-Shantal catheter and mediastinal pleural drains are in place. Overall the aeration has decreased. There is minimal atelectasis at the right  
lung base and left lung base. No pneumothorax. No pulmonary edema. 18  XR CHEST PORT Final result Impression:  IMPRESSION:  
No acute finding in the lungs other than possible slight amount of left  
pneumothorax inferiorly and medially. Narrative:  INDICATION: Postop cardiac surgery COMPARISON: 2018 A single frontal view was obtained. The time of this study is 1949 hours. ET  
tube is in satisfactory position. . Chest tubes are noted. There are central  
lines entering via the base of the right neck. The shorter catheter tip overlies  
the superior vena cava shadow. The more distal tip overlies the main pulmonary  
artery region. There is also a line overlying the spine which a sense from  
below. Its tip overlies the general region of the main pulmonary artery. The  
lungs are clear. Heart size is normal. There may be a slight amount of  
pneumothorax along the inferior medial aspect of the left hemithorax. [facility] Labs:  
Recent Labs  
  18 
0804  18 
0420  18 WBC  --   --  16.7*  --  23.9* HGB  --   --  10.5*   < > 12.2 HCT  --   --  31.2*   < > 35.9*  
PLT  --   --  178  --  188 NA  --   --  144   < > 145 K  --   --  4.1   < > 4.0  
BUN  --   --  12   < > 10 CREA  --   --  0.95   < > 0.95 GLU  --   --  88   < > 130* GLUCPOC 100   < >  --    < >  --   
INR  --   --   --   --  1.1  
 < > = values in this interval not displayed. Vitals: 
 
Visit Vitals /68 Pulse 86 Temp 98.4 °F (36.9 °C) Resp 21 Ht 6' (1.829 m) Wt 187 lb 13.3 oz (85.2 kg) SpO2 95% BMI 25.47 kg/m² Temp (24hrs), Av °F (36.7 °C), Min:96.3 °F (35.7 °C), Max:99.3 °F (37.4 °C) Last 3 Recorded Weights in this Encounter 18 1619 Weight: 187 lb 13.3 oz (85.2 kg) Hemodynamics: 
 CO: CO (l/min): 6.2 l/min CI: CI (l/min/m2): 3 l/min/m2 CVP:   
 SVR: SVR (dyne*sec)/cm5: 785 (dyne*sec)/cm5 (11/21/18 0400) PAP Systolic:   
 PAP Diastolic:   
 PVR:   
 IH90: SVO2 (%): 64 % (11/21/18 0700) SCV02:   
 
Ventilator: 
Ventilator Volumes Vt Set (ml): 500 ml (11/20/18 1939) Vt Exhaled (Machine Breath) (ml): 507 ml (11/20/18 1939) Ve Observed (l/min): 6.1 l/min (11/20/18 1939) Oxygen Therapy: 
Oxygen Therapy O2 Sat (%): 95 % (11/21/18 0700) Pulse via Oximetry: 86 beats per minute (11/21/18 0700) O2 Device: Nasal cannula (11/21/18 0400) O2 Flow Rate (L/min): 3 l/min (11/21/18 0400) FIO2 (%): 40 % (11/20/18 2226) Admission Weight: Last Weight Weight: 187 lb 13.3 oz (85.2 kg) Weight: 187 lb 13.3 oz (85.2 kg) Intake / Output / Drain: 
Current Shift: 11/21 0701 - 11/21 1900 In: 20 [I.V.:20] Out: - Last 24 hrs.:  
 
Intake/Output Summary (Last 24 hours) at 11/21/2018 1398 Last data filed at 11/21/2018 0710 Gross per 24 hour Intake 4858.03 ml Output 3060 ml Net 1798.03 ml EXAM:   
  General:nausea Chest:  Stable sternum Incisions: dry and intact Lungs:    Rhonchi bilaterally. Heart:  Regular rate and rhythm, S1, S2 normal, no murmur, no click, Abdomen:   Soft, non-tender. Bowel sounds present. Extremities:  mild edema Neurologic:  Gross motor and sensory apparatus intact.  
  
 
Signed By: NICOLASA Nazario

## 2018-11-21 NOTE — PROGRESS NOTES
Patient extubated at 2310 to 4L Nasal Cannula. No distress or stridor noted, patient tolerated well.

## 2018-11-21 NOTE — INTERDISCIPLINARY ROUNDS
Interdisciplinary team rounds were held 11/21/2018 with the following team members:Care Management, Diabetes Treatment Specialist, Nursing, Nutrition, Pharmacy, Physical Therapy, Physician and Clinical Coordinator. Plan of care discussed. See clinical pathway and/or care plan for interventions and desired outcomes.

## 2018-11-21 NOTE — PROGRESS NOTES
Problem: Self Care Deficits Care Plan (Adult) Goal: *Acute Goals and Plan of Care (Insert Text) Occupational Therapy Goals: 
Initiated 11/21/2018 1. Patient will perform grooming standing with supervision within 7 days. 2. Patient will perform toileting with supervision within 7 days. 3. Patient will perform upper body dressing and lower body dressing with supervision within 7 days. 4. Patient will be independent with cardiac home exercise program within 7 days. 5. Patient will transfer from toilet with supervision using the least restrictive device and appropriate durable medical equipment within 7 days. Occupational Therapy EVALUATION Patient: Dorothea Moerlos (89 y.o. male) Date: 11/21/2018 Primary Diagnosis: NSTEMI (non-ST elevation myocardial infarction) ([facility] [address] CAD (coronary artery disease) Procedure(s) (LRB): 
CORONARY ARTERY BYPASS GRAFT TIMES TWO  WITH RIGHT SAPHENOPUS VEIN GRAFT ( ENDOSCOPIC HARVEST ) AND LEFT INTERNAL MAMMARY ARTERY WITH EXTRA CORPOREAL CIRCULATION. DM AND EPIAORTIC ULTRASOUND DONE BY DR Abdi Tyler (N/A) 1 Day Post-Op Precautions:   Sternal, Fall ASSESSMENT : 
Based on the objective data described below, the patient presents with increased nausea and what appeared to be a vagal episode once seated edge of bed. BP was stable but HR dropped from 86 to 44 BPM and stayed in the 40's for approximately 8 seconds. Hr did recover back to the 80's and it appeared that pacer was functional the whole time. Nurse notified of this and reported pt had similar episode when OOB to chair with nursing earlier today. She cleared pt to continue activity. Pts family was present and pt and his family were educated on all sternal precautions and verbally educated on adaptive stratgies for ADLs. Issued handouts on all teaching. Min to moderate assist for supine to sit edge of bed. Pt was able to perform crossed leg technique edge of bed for mock lower body dressing. Min assist for sit to stand and mobility to wheelchair. Pt is performing UB ADLS at a set up level to moderate assist level and lower body ADLs at a moderate assist level. HR during mobility was stable and in the 80's. Left pt with nursing at end of session for transfer out of ICU. Vitals:  
  11/21/18 1100 11/21/18 1143 11/21/18 1144 11/21/18 1146 BP: 110/68 115/71 107/49 119/77 BP 1 Location:   Left arm Left arm Left arm BP Patient Position:   Supine Sitting Sitting Pulse: 86   86    
Resp: 18        
Temp:          
SpO2: 98%        
Weight:          
Height:          
  
 
 
 
 
Patient will benefit from skilled intervention to address the above impairments. Patients rehabilitation potential is considered to be Good Factors which may influence rehabilitation potential include:  
[x]             None noted []             Mental ability/status []             Medical condition []             Home/family situation and support systems []             Safety awareness []             Pain tolerance/management 
[]             Other: PLAN : 
Recommendations and Planned Interventions: 
[x]               Self Care Training                  [x]        Therapeutic Activities [x]               Functional Mobility Training    []        Cognitive Retraining 
[x]               Therapeutic Exercises           []        Endurance Activities [x]               Balance Training                   []        Neuromuscular Re-Education []               Visual/Perceptual Training     [x]   Home Safety Training 
[x]               Patient Education                 [x]        Family Training/Education []               Other (comment): Frequency/Duration: Patient will be followed by occupational therapy 5 times a week to address goals. Discharge Recommendations: Home Health Further Equipment Recommendations for Discharge: TBD SUBJECTIVE:  
Patient stated I think I am going to throw up.  OBJECTIVE DATA SUMMARY:  
HISTORY:  
History reviewed. No pertinent past medical history. Past Surgical History:  
Procedure Laterality Date  HX HEENT    
 ear tubes as a child Prior Level of Function/Environment/Context: independent without assist devices Expanded or extensive additional review of patient history:  
 
Home Situation Home Environment: Private residence # Steps to Enter: (stair lift) One/Two Story Residence: Two story Living Alone: No 
Support Systems: Spouse/Significant Other/Partner Patient Expects to be Discharged to[de-identified] Private residence Current DME Used/Available at Home: None Tub or Shower Type: Tub/Shower combination Hand dominance: RightEXAMINATION OF PERFORMANCE DEFICITS: 
Cognitive/Behavioral Status: 
Neurologic State: Alert Hearing: Auditory Auditory Impairment: NoneVision/Perceptual:   
    
    
    
  
    
    
  
Range of Motion: 
 
AROM: Generally decreased, functional 
  
  
  
  
  
  
  
Strength: 
 
Strength: Generally decreased, functional 
  
  
  
 Coordination: 
 intact Balance: 
Sitting: Intact Standing: IntactFunctional Mobility and Transfers for ADLs:Bed Mobility: 
Rolling: Moderate assistance Supine to Sit: Minimum assistance; Moderate assistance Scooting: Minimum assistance Transfers: 
Sit to Stand: Contact guard assistance;Assist x2 Stand to Sit: Contact guard assistance;Assist x2 Bed to Chair: Minimum assistance Bathroom Mobility: Minimum assistance Toilet Transfer : Minimum assistance ADL Assessment: 
Feeding: Setup Oral Facial Hygiene/Grooming: Setup Bathing: Moderate assistance Upper Body Dressing: Moderate assistance Lower Body Dressing: Moderate assistance Toileting: Moderate assistance ADL Intervention and task modifications: 
Patient instructed no asymmetrical reaching over head to ensure bilateral UEs when shoulders >90* and to prevent deep bending and valsalva maneuvers. Pt was educated that they may have to adjust home setup to increase ease with items closer to waist height, don clothing tailor sitting and don all clothing while sitting prior to standing. Lower Body Dressing: 
Patient demonstrated mock lower body dressing via crossed leg technique without assist. 
 
Upper Body Dressing: 
Pullover Shirt:  educated to place pull over shirt face down, tread bilateral UE through sleeves and pull up to mid humerus prior to grasping shirt with bilateral hands at neck and bottom of shirt. Educated pt to hold shirt with bilateral hands and bring over head with both arms going up at the same time. Educated to rotate at waist with shoulder following hip motion to pull shirt tail down. Toileting Educated to rotate at the waist having shoulders rotate with waist with fair understanding Therapeutic Exercise: 
Deferred today Functional Measure: 
Barthel Index: 
 
Bathin Bladder: 10 Bowels: 10 
Groomin Dressin Feeding: 10 Mobility: 0 Stairs: 0 Toilet Use: 5 Transfer (Bed to Chair and Back): 10 Total: 55 Barthel and G-code impairment scale: 
Percentage of impairment CH 
0% CI 
1-19% CJ 
20-39% CK 
40-59% CL 
60-79% CM 
80-99% CN 
100% Barthel Score 0-100 100 99-80 79-60 59-40 20-39 1-19 
 0 Barthel Score 0-20 20 17-19 13-16 9-12 5-8 1-4 0 The Barthel ADL Index: Guidelines 1. The index should be used as a record of what a patient does, not as a record of what a patient could do. 2. The main aim is to establish degree of independence from any help, physical or verbal, however minor and for whatever reason. 3. The need for supervision renders the patient not independent. 4. A patient's performance should be established using the best available evidence. Asking the patient, friends/relatives and nurses are the usual sources, but direct observation and common sense are also important. However direct testing is not needed. 5. Usually the patient's performance over the preceding 24-48 hours is important, but occasionally longer periods will be relevant. 6. Middle categories imply that the patient supplies over 50 per cent of the effort. 7. Use of aids to be independent is allowed. Cade Gomes., Barthel, D.W. (7434). Functional evaluation: the Barthel Index. [address] [facility] (14)2. JESSY Villatoro, Hussain Murphy., Karel Duke., Vanessa, [address] [facility] (1999). Measuring the change indisability after inpatient rehabilitation; comparison of the responsiveness of the Barthel Index and Functional [place] Measure. Journal of Neurology, Neurosurgery, and Psychiatry, 66(4), 099-003. TOSHIA Yost, YINA Dinero, & Aj Gutierrez M.A. (2004.) Assessment of post-stroke quality of life in cost-effectiveness studies: The usefulness of the Barthel Index and the EuroQoL-5D. [facility], 13, 228-60 G codes: In compliance with CMSs Claims Based Outcome Reporting, the following G-code set was chosen for this patient based on their primary functional limitation being treated: The outcome measure chosen to determine the severity of the functional limitation was the barthel with a score of 55/100 which was correlated with the impairment scale. ? Self Care:  
  - CURRENT STATUS: CK - 40%-59% impaired, limited or restricted  - GOAL STATUS: CJ - 20%-39% impaired, limited or restricted  - D/C STATUS:  ---------------To be determined--------------- Occupational Therapy Evaluation Charge Determination History Examination Decision-Making LOW Complexity : Brief history review  LOW Complexity : 1-3 performance deficits relating to physical, cognitive , or psychosocial skils that result in activity limitations and / or participation restrictions  MEDIUM Complexity : Patient may present with comorbidities that affect occupational performnce.  Miniml to moderate modification of tasks or assistance (eg, physical or verbal ) with assesment(s) is necessary to enable patient to complete evaluation Based on the above components, the patient evaluation is determined to be of the following complexity level: LOW Pain: 
Pain Scale 1: Numeric (0 - 10) Pain Intensity 1: 5 Pain Location 1: Chest 
Pain Orientation 1: Anterior Pain Description 1: Aching;Constant; Heaviness;Pressure Pain Intervention(s) 1: Medication (see MAR) Activity Tolerance:  
 
Please refer to the flowsheet for vital signs taken during this treatment. After treatment:  
[x] Patient left in no apparent distress sitting up in wheelchair 
[] Patient left in no apparent distress in bed 
[x] Call bell left within reach [x] Nursing notified 
[] Caregiver present 
[] Bed alarm activated COMMUNICATION/EDUCATION:  
The patients plan of care was discussed with: Physical Therapist, Registered Nurse and patient and his family. [x] Home safety education was provided and the patient/caregiver indicated understanding. [x] Patient/family have participated as able in goal setting and plan of care. [x] Patient/family agree to work toward stated goals and plan of care. [] Patient understands intent and goals of therapy, but is neutral about his/her participation. [] Patient is unable to participate in goal setting and plan of care. This patients plan of care is appropriate for delegation to [facility] [place]. Thank you for this referral. 
Nedra Severe, OTR/L Time Calculation: 23 mins

## 2018-11-21 NOTE — PROGRESS NOTES
3772- Bedside and Verbal shift change report given to Thuy N Sofia Cardoso (oncoming nurse) by Maricruz SPENCER  (offgoing nurse). Report included the following information SBAR, Kardex, Intake/Output, Recent Results, Cardiac Rhythm Paced and Alarm Parameters . 9783- Shift assessment completed; see flow sheet for details. Pt A/VOx4; follows commands without difficulty; speech is clear. Currently A-paced 86/20; NSR under pacer; BP stable right now: PRN Albumin given to support BP and Timur stopped; will monitor. Lungs are clear; diminished in the bases; currently on NC 3LPM; denies SOB/respiratory distress; breathing is regular but shallow; pulmonary toileting encouraged and accepted. ABD is semi-soft; BS hypoactive; will start clear liquid diet this AM; one episode of nausea this AM; PRn Zofran given. Almanzar catheter in place; adequate UOP. Skin is warm and dry; sternotomy incision with dry dressing in place. Voiced complaints of incisional pain 6/10; will give PRN Roxicodone. 1- Pt assisted back to bed with 2 person assist; tolerated transfer well. 1015- [place]; Cordis and ART removed; NICOLASA [place] aware prior to removal.  
 
1105- Almanzar catheter removed without difficulty. 1120- Pt working with PT/OT in preparation to be transferred to PCU; HR noted to drop into the upper 40s even with pacer in place. Pt also noted to be diaphoretic with nausea; no drop in BP; pt remains responsive during episode. NICOLASA Malhotra called to the bedside; pace maker settings changed to VVI 60/6; another PRN Albumin given for volume. Pt assisted back to bed. Pt to stay in the CCU at this time 1145- Pt resting comfortably; A/V/O; no further issues at this time. Mellisa Potts 1723 and Dr. Marcela Garcia at the bedside; updated on pt's condition. Marcela Garcia would like pt to be transferred to PCU.  
 
1230- Verbal report given C. Corrinne Papa RN

## 2018-11-21 NOTE — ANESTHESIA POSTPROCEDURE EVALUATION
Post-Anesthesia Evaluation and Assessment Patient: Clinton Ortiz MRN: [id number]  SSN: [id number] YOB: 1965  Age: 46 y.o. Sex: male I have evaluated the patient and they are stable and ready for discharge from the PACU. Cardiovascular Function/Vital Signs Visit Vitals /62 Pulse 70 Temp 36.7 °C (98 °F) Resp 20 Ht 6' (1.829 m) Wt 85.2 kg (187 lb 13.3 oz) SpO2 97% BMI 25.47 kg/m² Patient is status post General anesthesia for Procedure(s): CORONARY ARTERY BYPASS GRAFT TIMES TWO  WITH RIGHT SAPHENOPUS VEIN GRAFT ( ENDOSCOPIC HARVEST ) AND LEFT INTERNAL MAMMARY ARTERY WITH EXTRA CORPOREAL CIRCULATION. DM AND EPIAORTIC ULTRASOUND DONE BY DR Andree Sanchez. Nausea/Vomiting: None Postoperative hydration reviewed and adequate. Pain: 
Pain Scale 1: Numeric (0 - 10) (11/21/18 1100) Pain Intensity 1: 3 (11/21/18 1100) Managed Neurological Status:  
Neuro (WDL): Within Defined Limits (11/20/18 1126) Neuro Neurologic State: Alert (11/21/18 1200) Orientation Level: Oriented X4 (11/21/18 0800) Cognition: Appropriate decision making; Appropriate safety awareness; Follows commands (11/21/18 0800) Speech: Clear (11/21/18 0800) Size L Pupil (mm): (P) 3 (11/20/18 2000) Size R Pupil (mm): (P) 3 (11/20/18 2000) sedated Mental Status, Level of Consciousness: sedated Pulmonary Status:  
O2 Device: Nasal cannula (11/21/18 1200) Adequate oxygenation and airway patent Complications related to anesthesia: None Post-anesthesia assessment completed. No concerns Signed By: Dewayne Roy MD   
 November 21, 2018 Procedure(s): CORONARY ARTERY BYPASS GRAFT TIMES TWO  WITH RIGHT SAPHENOPUS VEIN GRAFT ( ENDOSCOPIC HARVEST ) AND LEFT INTERNAL MAMMARY ARTERY WITH EXTRA CORPOREAL CIRCULATION. DM AND EPIAORTIC ULTRASOUND DONE BY DR Andree Sanchez. <BSHSIANPOST> Visit Vitals /62 Pulse 70 Temp 36.7 °C (98 °F) Resp 20 Ht 6' (1.829 m) Wt 85.2 kg (187 lb 13.3 oz) SpO2 97% BMI 25.47 kg/m²

## 2018-11-21 NOTE — INTERDISCIPLINARY ROUNDS
Critical care interdisciplinary rounds held on 11/21/18. Following members present, Primary Nurse, Intensivist, Pharmacy, Diabetes Treatment, Case Management, Occupational Therapy, Physical Therapy,  and Nutrition. Plan of care discussed. See clinical pathway fo plan of care and interventions and desired outcomes.

## 2018-11-21 NOTE — PROGRESS NOTES
Pt transferred from [address] [place] [address] to [address] [place] [address] with family . This time pt with VVI  Pacer as back up , pt Now NSR without bradycardia. Also nausea relieved with increased Zofran  Pacer wired retaped to chest.  After released signed and held orders insulin had dropped off the Mar called Tina Shaver and called ava and got it reordered.

## 2018-11-21 NOTE — PROCEDURES
[facility]  2D ECHOCARDIOGRAM    Jennifer Villarreal  MR#: [id number]  : 1965  ACCOUNT #: [de-identified]   DATE OF SERVICE: 2018    REASON FOR TEST:  Coronary artery disease. Spirometry was performed and it reveals:  1. Mild airflow obstruction. 2.  Normal FVC. 3.  Normal flow volume loop.       Mae Keita MD       ERG / CRISTIAN  D: 2018 11:25     T: 2018 14:32  JOB #: [id number]  CC: Tod Mcgrath MD

## 2018-11-21 NOTE — OP NOTES
[address]  OPERATIVE REPORT    Kerwin Bloodgood  MR#: [id number]  : 1965  ACCOUNT #: [de-identified]   DATE OF SERVICE: 2018    PREOPERATIVE DIAGNOSES:  1. Coronary artery disease. 2.  Left main disease. 3.  Non-ST elevation myocardial infarction. 4.  Hyperlipidemia. POSTOPERATIVE DIAGNOSES:  1. Coronary artery disease. 2.  Left main disease. 3.  Non-ST elevation myocardial infarction. 4.  Hyperlipidemia. PROCEDURE PERFORMED:  1. Coronary artery bypass grafting, LIMA to LAD, saphenous vein graft to obtuse marginal.    2.  Epiaortic ultrasound. 3.  Endoscopic vein harvest.  4.  Transesophageal echocardiography. SURGEON:  Gagan Yuan MD     ASSISTANT:  Concepcion Chen MD; Rony Crane PA-C    ANESTHESIOLOGIST:  Nereida He    ANESTHESIA:  General endotracheal.    ESTIMATED BLOOD LOSS:  500 mL. IMPLANTS:  None. COMPLICATIONS:  None. SPECIMENS REMOVED:  None. HISTORY OF PRESENT ILLNESS:  The patient is a 50-year-old gentleman who is a  for a golf course. He has a long smoking history as well as hyperlipidemia and presented with an NSTEMI a few days ago. He was found on catheterization to have severe left main and coronary artery disease. He has a normal ventricular function. He was brought to the operating room electively. The patient was prepped and draped in sterile fashion. After a timeout, an incision was made over the sternum. Simultaneously, our PA began endoscopic vein harvest of the saphenous vein. I then performed a median sternotomy. I elevated his left hemisternum and dissected out the left internal mammary artery. This was a nice sized, large caliber vessel. After dissecting out the mammary, I administered heparin for cardiopulmonary bypass. After the heparin circulated for 3 minutes, I divided the mammary artery and wrapped it in a papaverine soaked sponge. I then opened the pericardium.   I inspected the ascending aorta both digitally and also with an ultrasound to ensure that there were no calcifications or plaques along the aorta. The aorta appeared clear according to myself and the attending anesthesiologist.  We then proceeded with cannulation of the ascending aorta and the right atrium. We also placed antegrade and retrograde cardioplegic catheters for both antegrade and retrograde cardioplegia. Upon confirmation of a therapeutic ACT, we went on cardiopulmonary bypass. We then cross-clamped the ascending aorta and administered antegrade cardioplegia followed by a dose of retrograde cardioplegia. I next performed the OM anastomosis. We found a very healthy clean area to sew to. We had a nice anastomosis here with excellent distal runoff visualized by saline testing. We then performed our LIMA to LAD anastomosis. We found a healthy place on the LAD. About half of the way down, the mammary artery lied nicely under no tension and was tucked nicely under the apex of the left lung. We then performed our proximal anastomosis with the crossclamp on. Following this, we administered a hot shot retrograde and de-aired the vein graft. I then released the crossclamp. The heart began to beat almost immediately. We weaned from bypass quickly. We administered protamine and dried up our surgical sites. We placed atrial and ventricular pacing wires and mediastinal and left pleural chest tubes. I then placed 8 stainless steel wires and closed the soft tissue in 3 layers. The patient was then safely transported to the cardiac ICU out of the operating room.       MD TERRY [place] / [place]  D: 11/20/2018 19:54     T: 11/20/2018 23:20  JOB #: [id number]

## 2018-11-21 NOTE — PROGRESS NOTES
1940- pt arrived with OR team. Pt on precidex, amio, timur at 21. SVO2 recalibrated new reading 79, CO 6.2 CI 3. Pt resting calmly on vent Dr. Jose Garcia at bedside CXR reviewed. MD encouraged quick extubation this evening. 2100- pt sitting up in bed, grimacing trying to pull at tubes. Nods yes to pain. Morphine 2 mg IV given and precidex gtt increased to 0.7. Will wean as pt calms down 2145- pt bathed and linen changed. Pt remained calm. 2230- pt calm on precidex 0.4. Pt placed on cpap to assess for extubation readiness. 2300- spoke with Dr. Jose Garcia via telephone. Updated him on pt status and reviewed hemodynamics and abg. Agrees pt ready to extubate. MD states to continue to give albumin as pt needs overnight and is in hopes he can be delined and up by am. Will continue with plan of care 2310- pt extubated to NC 4 L without difficulty. 2313- pt reports pain 7/10 post extubation, morphine 2 mg IV given. Wife at pt bedside to visit. 0000- hemodynamics stable on timur at 10 mcg CT output approx 20-50 ml/hr. CI 2.5-3 SVO2 low 70's 
0200- timur weaned off. Pt given albumin to manage hypotension and hypovolemia. 0530- pt restarted on Timur despite receiving fluid resuscitation with albumin x 7 pt remains hypotensive in the 80's and CVP 7-8 and PAD 9-10.

## 2018-11-21 NOTE — CARDIO/PULMONARY
Cardiopulmonary Rehab Nursing Entry: 
 
Admit: NSTEMI, CABG 11/20/18 Card Rehab:  Chart reviewed and pt visited. Pt has the \"Cardiac Surgery Post Operative Instructions\" booklet  from a prior visit. Pt reported that he, \"hit a road bump\". He reported that he tried to get up and work with PT today and was not feeling well after the session. Per PT entry disposition plan undetermined at this time. Reviewed use of the incentive spirometer, cough and deep breathing while splinting the incision and leg exercises. Encouragement provided to use the incentive spirometer regularly and to increase effort. Also encouraged to read the post-op booklet. Pt refused to schedule his out-patient Cardiac Rehab at this time due to his current clinical status. Advised pt that if he has a rapid recovery and discharge prior to scheduling his orientation, CR will contact pt via phone. Will continue to review the post op booklet as pt progresses in the recovery process. Pt verbalized understanding.

## 2018-11-21 NOTE — PROGRESS NOTES
PCU SHIFT NURSING NOTE Bedside and Verbal shift change report given to [address] (oncoming nurse) by Davina Cary RN (offgoing nurse). Report included the following information SBAR, Kardex, ED Summary, Procedure Summary, Intake/Output, MAR, Accordion, Recent Results, Med Rec Status and Cardiac Rhythm NSR. Shift Summary:  
2000: Pt very adamant about not wanting his blood glucose checked every hour, he wants sleep, and he is not a diabetic. Pt has actually not been on insulin since 1537 as gtt has been on hold. Spoke with Dr. Evelena Cogan to ask if we could cut off gtt and what dose lantus to give. Per Dr. Evelena Cogan, because insulin gtt has been off for so long and BGs have been fine, stop gtt, and follow achs sliding scale orders. 2200: cut off and d/c'd insulin gtt, BG checked 124, no sliding scale to be given per protocol. 2126: Pt requesting pain medication for incisional pain. PRN jason given. 2220: Pt stated jason not helping much. PRN tylenol given per request, 
8473: Pts RIJ pulled. Pressure applied to site for 5 mins and pt lying flat for 30 mins. 9897: Pt up in recliner. Pt weaned to 1L NC. Bedside and Verbal shift change report given to [address] (oncoming nurse) by Adrián Stearns RN (offgoing nurse). Report included the following information SBAR, Kardex, ED Summary, Procedure Summary, Intake/Output, MAR, Accordion, Recent Results, Med Rec Status and Cardiac Rhythm NSR. Admission Date 11/18/2018 Admission Diagnosis NSTEMI (non-ST elevation myocardial infarction) ([facility] [address] CAD (coronary artery disease) Consults None Consults []PT []OT []Speech  
[]Case Management  
  
[] Palliative Cardiac Monitoring Order []Yes []No  
 
IV drips []Yes Drip:                            Dose: 
Drip:                            Dose: 
Drip:                            Dose:  
[]No  
 
GI Prophylaxis []Yes []No  
 
 
 
DVT Prophylaxis SCDs:  Sequential Compression Device: Bilateral  
     
 Adam stockings:     
  
[] Medication []Contraindicated []None Activity Level Activity Level: Up with Assistance Activity Assistance: No assistance needed Purposeful Rounding every 1-2 hour? []Yes Wilcox Score  Total Score: 3 Bed Alarm (If score 3 or >) []Yes  
[] Refused (See signed refusal form in chart) Arsh Score  Arsh Score: 18 Arsh Score (if score 14 or less) []PMT consult  
[]Wound Care consult []Specialty bed  
[] Nutrition consult Needs prior to discharge:  
Home O2 required:   
[]Yes []No  
 If yes, how much O2 required? Other:  
 Last Bowel Movement: Last Bowel Movement Date: 11/20/18 Influenza Vaccine Received Flu Vaccine for Current Season (usually Sept-March): No  
 Patient/Guardian Refused (Notify MD): Yes Pneumonia Vaccine Diet Active Orders Diet DIET DIABETIC CONSISTENT CARB Regular; No Conc. Sweets LDAs Peripheral IV 11/18/18 Right Antecubital (Active) Site Assessment Clean, dry, & intact 11/21/2018 11:00 AM  
Phlebitis Assessment 0 11/21/2018 11:00 AM  
Infiltration Assessment 0 11/21/2018 11:00 AM  
Dressing Status Clean, dry, & intact 11/21/2018 11:00 AM  
Dressing Type Transparent;Tape 11/21/2018 11:00 AM  
Hub Color/Line Status Pink;Capped;Flushed 11/21/2018 11:00 AM  
      
Neftali Drain #1 11/20/18 Mid Chest (Active) Site Assessment Clean, dry, & intact 11/21/2018  4:00 PM  
Dressing Status Clean, dry, & intact 11/21/2018  4:00 PM  
Drainage Description Serosanguinous 11/21/2018  4:00 PM  
Neftali Drain Airleak Yes 11/21/2018  4:00 PM  
Status Patent;Suction (specify 11/21/2018  4:00 PM  
Y Connector Used Yes 11/21/2018  4:00 PM  
Drainage Chamber Level (ml) 210 ml 11/21/2018  4:22 PM  
Output (ml) 10 ml 11/21/2018  4:22 PM  
            
Urinary Catheter [REMOVED] Urinary Catheter 11/20/18 Almanzar - Temperature-Indications for Use: Surgery Intake & Output Date 11/20/18 0700 - 11/21/18 2258 11/21/18 0700 - 11/22/18 6707 Shift [id number] 1900-0659 24 Hour Total 6606-0721 9009-8440 24 Hour Total  
INTAKE  
P.O.  50 50 120  120  
  P. O.  50 50 120  120  
I. V.(mL/kg/hr) 700(0.7) 3913(3.8) 4613(2.3) 942.9  942.9 Amicar Volume  161.7 161.7 Precedex Volume  85.1 85.1 Insulin Volume  14 14 13.3  13.3 Phenylephrine Volume  52.9 52.9 16.5  16.5 Amiodarone Volume  208.2 208.2 49.8  49.8 Volume (0.9% sodium chloride infusion) 500  500 Volume (0.9% sodium chloride infusion) 200 1000 1200 Volume (0.45% sodium chloride infusion)  118.5 118.5 60  60 Volume (0.9% sodium chloride infusion)  52.7 52.7 533.3  533.3 Volume (albumin human 5% (BUMINATE) solution 12.5 g)  2000 2000 250  250 Volume (ceFAZolin (ANCEF) 2 g/20 mL in sterile water IV syringe)  20 20 20  20 Volume (acetaminophen (OFIRMEV) infusion 1,000 mg)  200 200 Blood  675 675 Autotransfused  675 675 Shift Total(mL/kg) 700(8.2) [id number]) [id number]) 1062. 9(12.5)  1062. 9(12.5) OUTPUT Urine(mL/kg/hr)  1595(1.6) 1595(0.8) 520  520 Urine Voided    300  300 Urine Occurrence(s) 1 x  1 x Urine Output  600 600 Urine Output (mL) ([REMOVED] Urinary Catheter 11/20/18 Almanzar - Temperature)  995 995 220  220 Drains  400 400 216  216 Output (ml) Alric Frame Drain #1 11/20/18 Mid Chest)  400 400 216  216 Blood  1000 1000 Estimated Blood Loss  1000 1000 Shift Total(mL/kg)  [id number]) 1961(76.1) 736(8.6)  736(8.6)  1643 2343 326.9  326.9 Weight (kg) 85.2 85.2 85.2 85.2 85.2 85.2 Readmission Risk Assessment Tool Score Low Risk   
      
 4 Total Score 2 . Living with Significant Other. Assisted Living. LTAC. SNF. or  
Rehab  
 2 Charlson Comorbidity Score (Age + Comorbid Conditions) Criteria that do not apply:  
 Has Seen PCP in Last 6 Months (Yes=3, No=0) Patient Length of Stay (>5 days = 3) IP Visits Last 12 Months (1-3=4, 4=9, >4=11) Pt. Coverage (Medicare=5 , Medicaid, or Self-Pay=4) Expected Length of Stay 8d 2h Actual Length of Stay 3

## 2018-11-21 NOTE — PROGRESS NOTES
[facility] Consult Service Progress NOTEPulmonary, Critical Care, and Sleep Medicine Name: Tammy Quiros MRN: [id number] : 1965 Hospital: [facility] Date: 2018  Admission Date: 2018 Hospital Day: 4 IMPRESSION:  
1. CAD - severe tight LM 2. CABG x 2 3. Smoker since teen 1 PPD x 35+ yrs 4. Additional workup outlined below 5. Multiorgan dysfunction as outlined above: Pt has one or more acute or chronic illnesses with severe exacerbation with progression or side effects of treatment that poses a threat to life or bodily function 6. Pt is requiring Drug therapy requiring intensive monitoring for toxicity RECOMMENDATIONS/PLAN:  
1. CCU monitoring after his CABG. Appears medically stable to proceed with surgery. 2. Discussed smoking cessation with pts wife and the pt.  
3. Incentive Spirometry 4. Bronchodilators prn 
5. Smoking cessation counseling done- pt will followup for more discussions after he recovers from CABG 6. PT, OT when able 7. Pt needs IV fluids with additives and Drug therapy requiring intensive monitoring for toxicity 8. DVT, SUP prophylaxiss 9. Glucose monitoring and insulin management for tight glycemic control 10. Prescription drug management with home med reconciliation done [x] High complexity decision making was performed [x] See my orders for details MAR reviewed and pertinent medications noted or modified as needed Current Facility-Administered Medications Medication  sodium chloride (NS) flush 5-10 mL  sodium chloride (NS) flush 5-10 mL  albumin human 5% (BUMINATE) solution 12.5 g  
 0.45% sodium chloride infusion  
 0.9% sodium chloride infusion  acetaminophen (TYLENOL) tablet 650 mg  
 morphine injection 1-2 mg  
 naloxone (NARCAN) injection 0.4 mg  
 ceFAZolin (ANCEF) 2 g/20 mL in sterile water IV syringe  ondansetron (ZOFRAN) injection 4 mg  albuterol (PROVENTIL VENTOLIN) nebulizer solution 2.5 mg  
 DOBUTamine (DOBUTREX) 2,000 mcg/ml infusion  magnesium oxide (MAG-OX) tablet 400 mg  polyethylene glycol (MIRALAX) packet 34 g  
 senna-docusate (PERICOLACE) 8.6-50 mg per tablet 1 Tab  ELECTROLYTE REPLACEMENT PROTOCOL  magnesium sulfate 1 g/100 ml IVPB (premix or compounded)  insulin regular (NOVOLIN R, HUMULIN R) 100 Units in 0.9% sodium chloride 100 mL infusion  glucose chewable tablet 16 g  
 dextrose (D50W) injection syrg 12.5-25 g  
 glucagon (GLUCAGEN) injection 1 mg  
 insulin lispro (HUMALOG) injection  [START ON 2018] insulin lispro (HUMALOG) injection  acetaminophen (OFIRMEV) infusion 1,000 mg  
 calcium chloride injection 1 g  
 pantoprazole (PROTONIX) tablet 40 mg  
 oxyCODONE IR (ROXICODONE) tablet 5-10 mg  
 PHENYLephrine (PF)(GREGOR-SYNEPHRINE) 30 mg in 0.9% sodium chloride 250 mL infusion  amiodarone (CORDARONE) tablet 400 mg  
 ascorbic acid (vitamin C) (VITAMIN C) tablet 1,000 mg  chlorhexidine (PERIDEX) 0.12 % mouthwash 15 mL  amiodarone (CORDARONE) 900 mg/250 ml D5W infusion  dexmedeTOMidine (PRECEDEX) 400 mcg in 0.9% sodium chloride 100 mL infusion  atorvastatin (LIPITOR) tablet 40 mg  
 aspirin delayed-release tablet 81 mg  
  
 
Vital Signs: Intake/Output: Intake/Output:  
Visit Vitals /68 Pulse 86 Temp 98.4 °F (36.9 °C) Resp 21 Ht 6' (1.829 m) Wt 85.2 kg (187 lb 13.3 oz) SpO2 95% BMI 25.47 kg/m² Temp (24hrs), Av °F (36.7 °C), Min:96.3 °F (35.7 °C), Max:99.3 °F (37.4 °C) Telemetry:normal sinus rhythm; O2 Device: Nasal cannula O2 Flow Rate (L/min): 3 l/min Wt Readings from Last 4 Encounters:  
18 85.2 kg (187 lb 13.3 oz)  
10/02/18 85.8 kg (189 lb 2.5 oz) Intake/Output Summary (Last 24 hours) at 2018 3564 Last data filed at 2018 0710 Gross per 24 hour Intake 4858.03 ml Output 3060 ml Net 1798.03 ml Last shift:      11/21 0701 - 11/21 1900 In: 20 [I.V.:20] Out: - Last 3 shifts: 11/19 1901 - 11/21 0700 In: 2009 [P.O.:50; I.V.:5413] Out: 1103 [Urine:2180; KLSMNP:784] Physical Exam:  
 General: in no respiratory distress and acyanotic, alert and oriented times 3;   male HEAD: Normocephalic, without obvious abnormality, atraumatic EYES: conjunctivae clear. PERRL,  AN Icteric sclerae NOSE: nares normal, no drainage, no flaring, THROAT: normal; Lips, mucosa dry;  Oral hygiene; No Thrush;  
 Neck: Supple, symmetrical, trachea midline,  No accessory mm use; No Stridor/ cuff leak, No goiter or thyroid tenderness LYMPH: No abnormally enlarged lymph nodes. in neck or groin Chest: normal 
 Lungs: normal air entry, clear to ausculation, good air movement. Heart: Regular rate and rhythm ; NO edema Abdomen: soft, non-tender, without masses or organomegaly : NO  Almanzar, Extremity: negative, cyanosis, clubbing, Neuro: alert; verbal; conversant, good strength. Psych: oriented to time, place and person; No agitation; normal affect Skin: Skin unremarkable; ;  
 Pulses:Bilateral, Radial, 2+ Capillary refill: normal; warm, well perfused, 
 
Tubes: DATA: 
 
Labs: 
 
Recent Labs  
  11/21/18 0420 11/20/18 2335 11/20/18 1943 11/19/18 
1603 11/18/18 
1702 WBC 16.7*  --  23.9*  --  12.4* HGB 10.5* 11.5* 12.2  --  15.1   --  188  --  311 INR  --   --  1.1 1.0  --   
APTT  --   --  26.8 26.4  --   
 
Recent Labs  
  11/21/18 0420 11/20/18 2335 11/20/18 
1943  146* 145  
K 4.1 3.9 4.0  
* 115* 114* CO2 23 26 24 GLU 88 95 130* BUN 12 11 10 CREA 0.95 0.94 0.95  
CA 8.1* 7.8* 7.8*  
MG 2.1 1.9 2.0 ALB 3.7 3.6 3.0*  
SGOT 24 26 26 ALT 20 20 18 Recent Labs  
  11/20/18 
2255 11/19/18 
1715 PH 7.36 7.47* PCO2 44 35 PO2 103* 59* HCO3 24 25 FIO2 40  --   
 
Recent Labs 11/18/18 
1702 TROIQ 2.15* No results found for: BNPP, BNP No results found for: CULT Lab Results Component Value Date/Time TSH 0.76 11/19/2018 04:03 PM  
 
 
Imaging: CXR: LLL atx This care involved high complexity medical decision making: I personally: · Reviewed the flowsheet and previous days notes · Reviewed and summarized records or history from previous days note or discussions with staff, family · Parenteral controlled substances - Reviewed/ Adjusted / Weaned / Started · High Risk Drug therapy requiring intensive monitoring for toxicity: eg steroids, pressors, antibiotics · Reviewed and/or ordered Clinical lab tests · Reviewed and/or ordered Radiology tests · Reviewed and/or ordered of Medicine tests · Independently visualized radiologic Images · Reviewed the patients ECG / Telemetry My assessment/management discussed with: Cardiothoracic surgery, Nursing, Pharmacy, Case Management, PT, OT, Respiratory Therapy, Nutrition, Diabetes specialist and Family for coordination of care Pt's condition is acute and unstable requiring inpatient hospitalization. This care involved high complexity decision making in order to assess, support vital system function, and to treat this degree of vital organ system failure, and to prevent further deterioration of the patients condition.   
 
Pebbles Loza MD

## 2018-11-21 NOTE — PROGRESS NOTES
Physical Therapy Goals Initiated 11/21/2018 1. Patient will move from supine to sit and sit to supine , scoot up and down and roll side to side in bed with independence within 5 days. 2. Patient will perform sit to/from stand with independence within 5 days. 3. Patient will ambulate 656 feet with least restrictive assistive device and independence within 5 days. 4. Patient will ascend/descend 15 stairs with single handrail(s) with independence within 5 days. 5. Patient will perform cardiac exercises per protocol with independence within 5 days. 6. Patient will verbally and functionally recall 3/3 sternal precautions within 5 days. physical Therapy EVALUATION Patient: Raegan Schmidt (27 y.o. male) Date: 11/21/2018 Primary Diagnosis: NSTEMI (non-ST elevation myocardial infarction) ([facility] [address] CAD (coronary artery disease) Procedure(s) (LRB): 
CORONARY ARTERY BYPASS GRAFT TIMES TWO  WITH RIGHT SAPHENOPUS VEIN GRAFT ( ENDOSCOPIC HARVEST ) AND LEFT INTERNAL MAMMARY ARTERY WITH EXTRA CORPOREAL CIRCULATION. DM AND EPIAORTIC ULTRASOUND DONE BY DR Miracle Landa (N/A) 1 Day Post-Op Precautions:   Sternal, Fall ASSESSMENT : 
Based on the objective data described below, the patient presents with decreased strength, increased nausea, and suspected vagal episode during session. Pt presented supine, agreeable to therapy with supportive family present. Cleared by RN to mobilize. Pt history and PLOF recounted as noted below. Pt on 3 L NC throughout session. Pt provided with instruction on sternal precautions, and on log roll technique. Pt able to perform bed mobility with min-modA x2 needing assistance for trunk control. Upon sitting up pt with increased nausea and diaphoretic and drop in BP as noted below. Pt then with suspected vagal episode with HR dropping to lowest observed 46 BPM, lasting approx 5 seconds before pace maker regulated rhythm.  RN made aware and came to ensure external pacemaker working properly. Symptoms alleviated with seated rest. Pt then preparing to be transferred off unit to PCU, so WC brought in for transport. Pt increasingly hesitant to have gait belt applied, but with explanation and assurance pt compliant. Pt then performing sit<>stand transfer with CGA with VCs to not push off with arms. Pt then amb 3 ft with CGA over to [facility] for transport. BP stable post activity sitting. Pt remained sitting in  to end session with RN present. Vitals:  
  11/21/18 1100 11/21/18 1143 11/21/18 1144 11/21/18 1146 BP: 110/68 115/71 107/49 119/77 BP 1 Location:   Left arm Left arm Left arm BP Patient Position:   Supine Sitting Sitting Pulse: 86   86    
Resp: 18        
Temp:          
SpO2: 98%        
Weight:          
Height:       
 
 
 
Will defer discharge disposition for now, pending progress with acute therapy. Suspect pt will be appropriate for outpatient cardiac rehab. Patient will benefit from skilled intervention to address the above impairments. Patients rehabilitation potential is considered to be Good Factors which may influence rehabilitation potential include:  
[]         None noted 
[]         Mental ability/status [x]         Medical condition 
[]         Home/family situation and support systems 
[]         Safety awareness 
[]         Pain tolerance/management 
[]         Other: PLAN : 
Recommendations and Planned Interventions: 
[x]           Bed Mobility Training             []    Neuromuscular Re-Education 
[x]           Transfer Training                   []    Orthotic/Prosthetic Training 
[x]           Gait Training                         []    Modalities [x]           Therapeutic Exercises           []    Edema Management/Control 
[x]           Therapeutic Activities            [x]    Patient and Family Training/Education 
[]           Other (comment): 
 
 Frequency/Duration: Patient will be followed by physical therapy  daily to address goals. Discharge Recommendations: To Be Determined Further Equipment Recommendations for Discharge: TBD SUBJECTIVE:  
Patient stated I don't want to wear that belt if it's going to be on my incision.  OBJECTIVE DATA SUMMARY:  
HISTORY:   
History reviewed. No pertinent past medical history. Past Surgical History:  
Procedure Laterality Date  HX HEENT    
 ear tubes as a child Prior Level of Function/Home Situation: PTA pt independent in amb without AD. Pt living at home with his wife and sons. Pt independent in ADLs. PTA pt smokes tobacco.  
Personal factors and/or comorbidities impacting plan of care: CABG, NSTEMI, CAD, HLD Home Situation Home Environment: Private residence # Steps to Enter: (stair lift) One/Two Story Residence: Two story Living Alone: No 
Support Systems: Spouse/Significant Other/Partner Patient Expects to be Discharged to[de-identified] Private residence Current DME Used/Available at Home: None Tub or Shower Type: Tub/Shower combination EXAMINATION/PRESENTATION/DECISION MAKING: Critical Behavior: 
Neurologic State: Alert Orientation Level: Oriented to person, Oriented to place, Oriented to situation Cognition: Appropriate decision making, Appropriate for age attention/concentration, Appropriate safety awareness, Follows commands Hearing: Auditory Auditory Impairment: None Range Of Motion: 
AROM: Generally decreased, functional 
  
  
  
  
  
  
  
Strength:   
Strength: Generally decreased, functional 
  
  
  
  
  
  
  
Functional Mobility: 
Bed Mobility: 
Rolling: Moderate assistance Supine to Sit: Minimum assistance; Moderate assistance Scooting: Minimum assistance Transfers: 
Sit to Stand: Contact guard assistance;Assist x2 Stand to Sit: Contact guard assistance;Assist x2 Balance:  
Sitting: Intact Standing: Intact Ambulation/Gait Training: Distance (ft): 3 Feet (ft) Assistive Device: Gait belt Ambulation - Level of Assistance: Contact guard assistance Gait Description (WDL): Exceptions to [facility] Gait Abnormalities: Decreased step clearance Base of Support: Widened Speed/Janeth: Pace decreased (<100 feet/min) Step Length: Left shortened;Right shortened Functional Measure: 
Barthel Index: 
 
Bathin Bladder: 10 Bowels: 10 
Groomin Dressin Feeding: 10 Mobility: 0 Stairs: 0 Toilet Use: 5 Transfer (Bed to Chair and Back): 10 Total: 55 Barthel and G-code impairment scale: 
Percentage of impairment CH 
0% CI 
1-19% CJ 
20-39% CK 
40-59% CL 
60-79% CM 
80-99% CN 
100% Barthel Score 0-100 100 99-80 79-60 59-40 20-39 1-19 
 0 Barthel Score 0-20 20 17-19 13-16 9-12 5-8 1-4 0 The Barthel ADL Index: Guidelines 1. The index should be used as a record of what a patient does, not as a record of what a patient could do. 2. The main aim is to establish degree of independence from any help, physical or verbal, however minor and for whatever reason. 3. The need for supervision renders the patient not independent. 4. A patient's performance should be established using the best available evidence. Asking the patient, friends/relatives and nurses are the usual sources, but direct observation and common sense are also important. However direct testing is not needed. 5. Usually the patient's performance over the preceding 24-48 hours is important, but occasionally longer periods will be relevant. 6. Middle categories imply that the patient supplies over 50 per cent of the effort. 7. Use of aids to be independent is allowed. Magdaleno Brewer, Barthel, D.W. ([id number]). Functional evaluation: the Barthel Index. [address] [facility] (14)2. JESSY Rasheed, Jannet Werner., Rigo Joaquin., Vanessa, [address] ().  Measuring the change indisability after inpatient rehabilitation; comparison of the responsiveness of the Barthel Index and Functional [place] Measure. Journal of Neurology, Neurosurgery, and Psychiatry, 664), [phone]. TOSHIA Stanford, YINA Dinero, & Hamida Marie M.A. (2004.) Assessment of post-stroke quality of life in cost-effectiveness studies: The usefulness of the Barthel Index and the EuroQoL-5D. [facility], 45, 453-43 G codes: In compliance with CMSs Claims Based Outcome Reporting, the following G-code set was chosen for this patient based on their primary functional limitation being treated: The outcome measure chosen to determine the severity of the functional limitation was the Barthel with a score of 55/100 which was correlated with the impairment scale. ? Mobility - Walking and Moving Around:  
  - CURRENT STATUS: CK - 40%-59% impaired, limited or restricted  - GOAL STATUS: CJ - 20%-39% impaired, limited or restricted  - D/C STATUS:  ---------------To be determined---------------  
 Pain: 
Pain Scale 1: Numeric (0 - 10) Pain Intensity 1: 5 Pain Location 1: Chest 
Pain Orientation 1: Anterior Pain Description 1: Aching;Constant; Heaviness;Pressure Pain Intervention(s) 1: Medication (see MAR) Activity Tolerance: Pt with fair activity tolerance, limited in activity by suspected vagal episode with decreased HR described above Please refer to the flowsheet for vital signs taken during this treatment. After treatment:  
[x]         Patient left in no apparent distress sitting up in wheel chair, preparing for transport with RN 
[]         Patient left in no apparent distress in bed 
[x]         Call bell left within reach [x]         Nursing notified 
[]         Caregiver present 
[]         Bed alarm activated COMMUNICATION/EDUCATION:  
The patients plan of care was discussed with: Occupational Therapist and Registered Nurse.  
[x]         Fall prevention education was provided and the patient/caregiver indicated understanding. [x]         Patient/family have participated as able in goal setting and plan of care. [x]         Patient/family agree to work toward stated goals and plan of care. []         Patient understands intent and goals of therapy, but is neutral about his/her participation. []         Patient is unable to participate in goal setting and plan of care. Thank you for this referral. 
Jose Gomez, SPT Time Calculation: 22 mins Regarding student involvement in patient care: A student participated in this treatment session. Per CMS Medicare statements and APTA guidelines I certify that the following was true: 1. I was present and directly observed the entire session. 2. I made all skilled judgments and clinical decisions regarding care. 3. I am the practitioner responsible for assessment, treatment, and documentation.

## 2018-11-22 ENCOUNTER — APPOINTMENT (OUTPATIENT)
Dept: GENERAL RADIOLOGY | Age: 53
DRG: 234 | End: 2018-11-22
Attending: PHYSICIAN ASSISTANT
Payer: COMMERCIAL

## 2018-11-22 LAB
ANION GAP SERPL CALC-SCNC: 7 MMOL/L (ref 5–15)
BUN SERPL-MCNC: 12 MG/DL (ref 6–20)
BUN/CREAT SERPL: 14 (ref 12–20)
CALCIUM SERPL-MCNC: 8.1 MG/DL (ref 8.5–10.1)
CHLORIDE SERPL-SCNC: 111 MMOL/L (ref 97–108)
CHOLEST SERPL-MCNC: 64 MG/DL
CO2 SERPL-SCNC: 24 MMOL/L (ref 21–32)
CREAT SERPL-MCNC: 0.83 MG/DL (ref 0.7–1.3)
ERYTHROCYTE [DISTWIDTH] IN BLOOD BY AUTOMATED COUNT: 14.3 % (ref 11.5–14.5)
GLUCOSE BLD STRIP.AUTO-MCNC: 136 MG/DL (ref 65–100)
GLUCOSE BLD STRIP.AUTO-MCNC: 142 MG/DL (ref 65–100)
GLUCOSE BLD STRIP.AUTO-MCNC: 154 MG/DL (ref 65–100)
GLUCOSE BLD STRIP.AUTO-MCNC: 158 MG/DL (ref 65–100)
GLUCOSE SERPL-MCNC: 119 MG/DL (ref 65–100)
HCT VFR BLD AUTO: 29.1 % (ref 36.6–50.3)
HDLC SERPL-MCNC: 24 MG/DL
HDLC SERPL: 2.7 {RATIO} (ref 0–5)
HGB BLD-MCNC: 9.7 G/DL (ref 12.1–17)
LDLC SERPL CALC-MCNC: 23.8 MG/DL (ref 0–100)
LIPID PROFILE,FLP: NORMAL
MCH RBC QN AUTO: 30.4 PG (ref 26–34)
MCHC RBC AUTO-ENTMCNC: 33.3 G/DL (ref 30–36.5)
MCV RBC AUTO: 91.2 FL (ref 80–99)
NRBC # BLD: 0 K/UL (ref 0–0.01)
NRBC BLD-RTO: 0 PER 100 WBC
PLATELET # BLD AUTO: 163 K/UL (ref 150–400)
PMV BLD AUTO: 10.1 FL (ref 8.9–12.9)
POTASSIUM SERPL-SCNC: 4.1 MMOL/L (ref 3.5–5.1)
RBC # BLD AUTO: 3.19 M/UL (ref 4.1–5.7)
SERVICE CMNT-IMP: ABNORMAL
SODIUM SERPL-SCNC: 142 MMOL/L (ref 136–145)
TRIGL SERPL-MCNC: 81 MG/DL (ref ?–150)
VLDLC SERPL CALC-MCNC: 16.2 MG/DL
WBC # BLD AUTO: 16.7 K/UL (ref 4.1–11.1)

## 2018-11-22 PROCEDURE — 74011250637 HC RX REV CODE- 250/637: Performed by: INTERNAL MEDICINE

## 2018-11-22 PROCEDURE — 97116 GAIT TRAINING THERAPY: CPT

## 2018-11-22 PROCEDURE — 74011250636 HC RX REV CODE- 250/636: Performed by: PHYSICIAN ASSISTANT

## 2018-11-22 PROCEDURE — 71045 X-RAY EXAM CHEST 1 VIEW: CPT

## 2018-11-22 PROCEDURE — 80048 BASIC METABOLIC PNL TOTAL CA: CPT

## 2018-11-22 PROCEDURE — 74011250637 HC RX REV CODE- 250/637: Performed by: PHYSICIAN ASSISTANT

## 2018-11-22 PROCEDURE — 97110 THERAPEUTIC EXERCISES: CPT

## 2018-11-22 PROCEDURE — 77010033678 HC OXYGEN DAILY

## 2018-11-22 PROCEDURE — 80061 LIPID PANEL: CPT

## 2018-11-22 PROCEDURE — 74011636637 HC RX REV CODE- 636/637: Performed by: PHYSICIAN ASSISTANT

## 2018-11-22 PROCEDURE — 82962 GLUCOSE BLOOD TEST: CPT

## 2018-11-22 PROCEDURE — 65660000000 HC RM CCU STEPDOWN

## 2018-11-22 PROCEDURE — 36415 COLL VENOUS BLD VENIPUNCTURE: CPT

## 2018-11-22 PROCEDURE — 85027 COMPLETE CBC AUTOMATED: CPT

## 2018-11-22 PROCEDURE — 74011000250 HC RX REV CODE- 250: Performed by: PHYSICIAN ASSISTANT

## 2018-11-22 RX ADMIN — CHLORHEXIDINE GLUCONATE 15 ML: 1.2 RINSE ORAL at 21:37

## 2018-11-22 RX ADMIN — INSULIN LISPRO 2 UNITS: 100 INJECTION, SOLUTION INTRAVENOUS; SUBCUTANEOUS at 18:45

## 2018-11-22 RX ADMIN — SENNOSIDES AND DOCUSATE SODIUM 1 TABLET: 8.6; 5 TABLET ORAL at 08:46

## 2018-11-22 RX ADMIN — ASPIRIN 81 MG: 81 TABLET, COATED ORAL at 08:46

## 2018-11-22 RX ADMIN — Medication 10 ML: at 06:00

## 2018-11-22 RX ADMIN — POTASSIUM CHLORIDE 20 MEQ: 750 TABLET, EXTENDED RELEASE ORAL at 08:43

## 2018-11-22 RX ADMIN — OXYCODONE HYDROCHLORIDE 5 MG: 5 TABLET ORAL at 12:07

## 2018-11-22 RX ADMIN — PANTOPRAZOLE SODIUM 40 MG: 40 TABLET, DELAYED RELEASE ORAL at 08:46

## 2018-11-22 RX ADMIN — FUROSEMIDE 40 MG: 40 TABLET ORAL at 08:46

## 2018-11-22 RX ADMIN — Medication 2 G: at 14:55

## 2018-11-22 RX ADMIN — Medication 2 G: at 02:27

## 2018-11-22 RX ADMIN — INSULIN LISPRO 2 UNITS: 100 INJECTION, SOLUTION INTRAVENOUS; SUBCUTANEOUS at 08:33

## 2018-11-22 RX ADMIN — METOPROLOL TARTRATE 12.5 MG: 25 TABLET ORAL at 21:37

## 2018-11-22 RX ADMIN — CHLORHEXIDINE GLUCONATE 15 ML: 1.2 RINSE ORAL at 08:47

## 2018-11-22 RX ADMIN — Medication 10 ML: at 08:37

## 2018-11-22 RX ADMIN — Medication 400 MG: at 17:11

## 2018-11-22 RX ADMIN — ACETAMINOPHEN 650 MG: 325 TABLET ORAL at 18:44

## 2018-11-22 RX ADMIN — OXYCODONE HYDROCHLORIDE 5 MG: 5 TABLET ORAL at 17:10

## 2018-11-22 RX ADMIN — Medication 2 G: at 08:36

## 2018-11-22 RX ADMIN — Medication 10 ML: at 21:38

## 2018-11-22 RX ADMIN — Medication 10 ML: at 14:55

## 2018-11-22 RX ADMIN — ATORVASTATIN CALCIUM 40 MG: 40 TABLET, FILM COATED ORAL at 08:46

## 2018-11-22 RX ADMIN — Medication 400 MG: at 08:43

## 2018-11-22 RX ADMIN — POLYETHYLENE GLYCOL 3350 34 G: 17 POWDER, FOR SOLUTION ORAL at 08:43

## 2018-11-22 RX ADMIN — METOPROLOL TARTRATE 12.5 MG: 25 TABLET ORAL at 08:44

## 2018-11-22 RX ADMIN — INSULIN LISPRO 2 UNITS: 100 INJECTION, SOLUTION INTRAVENOUS; SUBCUTANEOUS at 12:08

## 2018-11-22 RX ADMIN — OXYCODONE HYDROCHLORIDE 10 MG: 5 TABLET ORAL at 06:49

## 2018-11-22 RX ADMIN — OXYCODONE HYDROCHLORIDE 10 MG: 5 TABLET ORAL at 02:32

## 2018-11-22 RX ADMIN — OXYCODONE HYDROCHLORIDE 5 MG: 5 TABLET ORAL at 18:46

## 2018-11-22 RX ADMIN — OXYCODONE HYDROCHLORIDE AND ACETAMINOPHEN 1000 MG: 500 TABLET ORAL at 08:43

## 2018-11-22 NOTE — PROGRESS NOTES
11/21/2018 11:24 PM 
 
Admit Date: 11/18/2018 Admit Diagnosis:  
NSTEMI (non-ST elevation myocardial infarction) (HCC);CAD (coronary artery disease) Subjective:  
 
Raegan Schmidt has some postsurgical pain as expected, but is on the PCU. Sitting up in bed. Current Facility-Administered Medications Medication Dose Route Frequency  sodium chloride (NS) flush 5-10 mL  5-10 mL IntraVENous Q8H  
 sodium chloride (NS) flush 5-10 mL  5-10 mL IntraVENous PRN  
 acetaminophen (TYLENOL) tablet 650 mg  650 mg Oral Q4H PRN  
 naloxone (NARCAN) injection 0.4 mg  0.4 mg IntraVENous PRN  
 [START ON 11/22/2018] metoprolol tartrate (LOPRESSOR) tablet 12.5 mg  12.5 mg Oral Q12H  
 [START ON 11/22/2018] furosemide (LASIX) tablet 40 mg  40 mg Oral DAILY  ondansetron (ZOFRAN) injection 4 mg  4 mg IntraVENous Q4H PRN  
 alum-mag hydroxide-simeth (MYLANTA) oral suspension 30 mL  30 mL Oral Q2H PRN  
 zolpidem (AMBIEN) tablet 5 mg  5 mg Oral QHS PRN  
 [START ON 11/22/2018] potassium chloride SR (KLOR-CON 10) tablet 20 mEq  20 mEq Oral DAILY  ondansetron (ZOFRAN) injection 8 mg  8 mg IntraVENous Q4H PRN  
 sodium chloride (NS) flush 5-10 mL  5-10 mL IntraVENous Q8H  
 ceFAZolin (ANCEF) 2 g/20 mL in sterile water IV syringe  2 g IntraVENous Q6H  
 albuterol (PROVENTIL VENTOLIN) nebulizer solution 2.5 mg  2.5 mg Nebulization Q4H PRN  
 magnesium oxide (MAG-OX) tablet 400 mg  400 mg Oral BID  polyethylene glycol (MIRALAX) packet 34 g  34 g Oral DAILY  senna-docusate (PERICOLACE) 8.6-50 mg per tablet 1 Tab  1 Tab Oral DAILY  glucose chewable tablet 16 g  4 Tab Oral PRN  
 glucagon (GLUCAGEN) injection 1 mg  1 mg IntraMUSCular PRN  
 insulin lispro (HUMALOG) injection   SubCUTAneous TIDAC  
 [START ON 11/22/2018] insulin lispro (HUMALOG) injection   SubCUTAneous AC&HS  calcium chloride injection 1 g  1 g IntraVENous PRN  pantoprazole (PROTONIX) tablet 40 mg  40 mg Oral ACB  oxyCODONE IR (ROXICODONE) tablet 5-10 mg  5-10 mg Oral Q3H PRN  
 ascorbic acid (vitamin C) (VITAMIN C) tablet 1,000 mg  1,000 mg Oral DAILY  chlorhexidine (PERIDEX) 0.12 % mouthwash 15 mL  15 mL Oral Q12H  
 atorvastatin (LIPITOR) tablet 40 mg  40 mg Oral DAILY  aspirin delayed-release tablet 81 mg  81 mg Oral DAILY Objective:  
  
Physical Exam:   
Visit Vitals /67 Pulse 72 Temp 98.6 °F (37 °C) Resp 20 Ht 6' (1.829 m) Wt 187 lb 13.3 oz (85.2 kg) SpO2 97% BMI 25.47 kg/m² Gen:  NAD Mental Status - Alert. General Appearance - Not in acute distress. Chest and Lung Exam  
Inspection: Accessory muscles - No use of accessory muscles in breathing. Auscultation:  
Breath sounds: - Normal.  
Cardiovascular Inspection: Jugular vein - Bilateral - Inspection Normal.  Sternotomy scar bandage clean dry and intact. Palpation/Percussion:  
Apical Impulse: - Normal.  
Auscultation: Rhythm - Regular. Heart Sounds - S1 WNL and S2 WNL. No S3 or S4. Murmurs & Other Heart Sounds: Auscultation of the heart reveals - No Murmurs. Peripheral Vascular Upper Extremity: Inspection - Bilateral - No Cyanotic nailbeds or Digital clubbing. Lower Extremity:  
Palpation: Edema - Bilateral - No edema. Abdomen:   Soft, non-tender, bowel sounds are active. Neuro: A&O times 3, CN and motor grossly WNL Data Review:  
Recent Labs  
  11/21/18 0420 11/20/18 2335 11/20/18 1943 WBC 16.7*  --  23.9* HGB 10.5* 11.5* 12.2 HCT 31.2* 34.2* 35.9*  
  --  188 Recent Labs  
  11/21/18 
0420 11/20/18 
2335 11/20/18 1943 11/19/18 
1603  146* 145  --   
K 4.1 3.9 4.0  --   
* 115* 114*  --   
CO2 23 26 24  --   
GLU 88 95 130*  --   
BUN 12 11 10  --   
CREA 0.95 0.94 0.95  --   
CA 8.1* 7.8* 7.8*  --   
MG 2.1 1.9 2.0  --   
ALB 3.7 3.6 3.0*  --   
TBILI 0.9 1.0 0.6  --   
SGOT 24 26 26  --   
ALT 20 20 18  --   
INR  --   --  1.1 1.0 No results for input(s): TROIQ, CPK, CKMB in the last 72 hours. Intake/Output Summary (Last 24 hours) at 11/21/2018 2324 Last data filed at 11/21/2018 2129 Gross per 24 hour Intake 3737.26 ml Output 1611 ml Net 2126.26 ml Telemetry: normal sinus rhythm with intermittent pacing Assessment:  
 
Principal Problem: 
  NSTEMI (non-ST elevation myocardial infarction) ([facility] [address] (11/18/2018) Active Problems: 
  Mixed hyperlipidemia (11/18/2018) Tobacco use disorder (11/18/2018) CAD (coronary artery disease), native coronary artery (11/19/2018) CAD (coronary artery disease) (11/20/2018) Plan:  
 
Doing great s/p CABG. Wean drips, remove lines and tubes, restart PO meds and mobilize as tolerated. Dr. Amanda Story is on call for the holiday. Please call if we can assist further during this hospitalization. Otherwise, follow-up with me 1 week after surgical follow-up (added to discharge instructions). Surgical team's care is greatly appreciated. Counseled on smoking cessation.

## 2018-11-22 NOTE — PROGRESS NOTES
Problem: Mobility Impaired (Adult and Pediatric) Goal: *Acute Goals and Plan of Care (Insert Text) Physical Therapy Goals Initiated 11/21/2018 1. Patient will move from supine to sit and sit to supine , scoot up and down and roll side to side in bed with independence within 5 days. 2.  Patient will perform sit to/from stand with independence within 5 days. 3.  Patient will ambulate 656 feet with least restrictive assistive device and independence within 5 days. 4.  Patient will ascend/descend 15 stairs with single handrail(s) with independence within 5 days. 5.  Patient will perform cardiac exercises per protocol with independence within 5 days. 6.  Patient will verbally and functionally recall 3/3 sternal precautions within 5 days. physical Therapy TREATMENT Patient: Joao Espinoza (24 y.o. male) Date: 11/22/2018 Diagnosis: NSTEMI (non-ST elevation myocardial infarction) ([facility] [address] CAD (coronary artery disease) NSTEMI (non-ST elevation myocardial infarction) ([facility] [address] Procedure(s) (LRB): 
CORONARY ARTERY BYPASS GRAFT TIMES TWO  WITH RIGHT SAPHENOPUS VEIN GRAFT ( ENDOSCOPIC HARVEST ) AND LEFT INTERNAL MAMMARY ARTERY WITH EXTRA CORPOREAL CIRCULATION. DM AND EPIAORTIC ULTRASOUND DONE BY DR Vidya Rudolph (N/A) 2 Days Post-Op Precautions: Sternal, Fall Chart, physical therapy assessment, plan of care and goals were reviewed. ASSESSMENT: 
Physical therapy visit completed on a 46year old male s/p CABG x 2  POD2. Patient received in bed and agreeable to participate,  vitals within acceptable parameters and nursing cleared to ambulate. Patient required min assist for bed mobility and occasional reminder to adhere to sternal precautions. Transfers at contact guard level and patient able to ambulate in hallway without AD x approx 80 feet. Gait radha is slowed but steady, no LOB noted, patient encouraged to relax UEs to allow arm swing. Patient returned to chair with no SOB or fatigue noted.   Performed cardiac exercises x 5 reps each and reviewed sternal precautions. Patient with good tolerance of activity today and will benefit from continued therapy with outpatient cardiac rehab. Progression toward goals: 
[x]      Improving appropriately and progressing toward goals 
[]      Improving slowly and progressing toward goals 
[]      Not making progress toward goals and plan of care will be adjusted PLAN: 
Patient continues to benefit from skilled intervention to address the above impairments. Continue treatment per established plan of care. Discharge Recommendations:  Outpatient Cardiac Rehab Further Equipment Recommendations for Discharge: SUBJECTIVE:  
Patient stated i am doing okay.  The patient stated 2/3 sternal precautions. Reviewed all 3 with patient. OBJECTIVE DATA SUMMARY:  
Patient mobilized on continuous portable monitor/telemetry. Critical Behavior: 
Neurologic State: Alert, Appropriate for age Orientation Level: Oriented X4 Cognition: Appropriate decision making, Appropriate for age attention/concentration, Appropriate safety awareness, Follows commands Functional Mobility Training: 
Bed Mobility: 
Log Rolling: Minimum assistance Supine to Sit: Minimum assistance Scooting: Minimum assistance Transfers: 
Sit to Stand: Contact guard assistance Stand to Sit: Contact guard assistance Balance: 
Sitting: Intact Standing: IntactAmbulation/Gait Training: 
Distance (ft): 80 Feet (ft) Assistive Device: Gait belt Ambulation - Level of Assistance: Contact guard assistance Speed/Janeth: Pace decreased (<100 feet/min) Step Length: Left shortened;Right shortened Stairs: Therapeutic Exercises:  
Patient instructed on the benefits and demonstrated cardiac exercises while seated with verbal cuing and demonstration.  Instructed and indicated understanding on how to progress reps and sets against gravity, working up to 5 lbs and so on based on surgeon clearance for more weight in prep for functional activity. Can use household items for weights. CARDIAC EXERCISE Sets Reps Active Active Assist  
Passive Self ROM Comments Shoulder flexion 1 5 [x]                                            []                                            []                                            []                                              
Shoulder abduction 1  5 [x]                                            []                                            []                                            []                                              
Scapular elevation 1 5 [x]                                            []                                            []                                            []                                              
Scapular retraction 1 5 [x]                                            []                                            []                                            []                                              
Trunk rotation 1 5 [x]                                            []                                            []                                            []                                              
Trunk sidebending 1 5 [x]                                            []                                            []                                            []                                              
   []                                            []                                            []                                            []                                              
 
Pain: 
Pain Scale 1: Numeric (0 - 10) Pain Intensity 1: 7 Pain Location 1: Incisional 
Pain Orientation 1: Anterior Pain Description 1: Aching Pain Intervention(s) 1: Medication (see MAR) Activity Tolerance:  
good Please refer to the flowsheet for vital signs taken during this treatment. After treatment:  
[x] Patient left in no apparent distress sitting up in chair 
[] Patient left in no apparent distress in bed 
[] Call bell left within reach [x] Nursing notified 
[x] Caregiver present 
[] Bed alarm activated COMMUNICATION/COLLABORATION:  
The patients plan of care was discussed with: Registered Nurse Sally Ballesteros Time Calculation: 24 mins

## 2018-11-23 ENCOUNTER — APPOINTMENT (OUTPATIENT)
Dept: GENERAL RADIOLOGY | Age: 53
DRG: 234 | End: 2018-11-23
Attending: PHYSICIAN ASSISTANT
Payer: COMMERCIAL

## 2018-11-23 LAB
ABO + RH BLD: NORMAL
ANION GAP SERPL CALC-SCNC: 7 MMOL/L (ref 5–15)
BLD PROD TYP BPU: NORMAL
BLD PROD TYP BPU: NORMAL
BLOOD GROUP ANTIBODIES SERPL: NORMAL
BPU ID: NORMAL
BPU ID: NORMAL
BUN SERPL-MCNC: 12 MG/DL (ref 6–20)
BUN/CREAT SERPL: 15 (ref 12–20)
CALCIUM SERPL-MCNC: 8.1 MG/DL (ref 8.5–10.1)
CHLORIDE SERPL-SCNC: 105 MMOL/L (ref 97–108)
CO2 SERPL-SCNC: 29 MMOL/L (ref 21–32)
CREAT SERPL-MCNC: 0.78 MG/DL (ref 0.7–1.3)
CROSSMATCH RESULT,%XM: NORMAL
CROSSMATCH RESULT,%XM: NORMAL
ERYTHROCYTE [DISTWIDTH] IN BLOOD BY AUTOMATED COUNT: 13.9 % (ref 11.5–14.5)
GLUCOSE BLD STRIP.AUTO-MCNC: 134 MG/DL (ref 65–100)
GLUCOSE SERPL-MCNC: 127 MG/DL (ref 65–100)
HCT VFR BLD AUTO: 31.6 % (ref 36.6–50.3)
HGB BLD-MCNC: 10.6 G/DL (ref 12.1–17)
MCH RBC QN AUTO: 29.8 PG (ref 26–34)
MCHC RBC AUTO-ENTMCNC: 33.5 G/DL (ref 30–36.5)
MCV RBC AUTO: 88.8 FL (ref 80–99)
NRBC # BLD: 0 K/UL (ref 0–0.01)
NRBC BLD-RTO: 0 PER 100 WBC
PLATELET # BLD AUTO: 195 K/UL (ref 150–400)
PMV BLD AUTO: 9.9 FL (ref 8.9–12.9)
POTASSIUM SERPL-SCNC: 3.5 MMOL/L (ref 3.5–5.1)
RBC # BLD AUTO: 3.56 M/UL (ref 4.1–5.7)
SERVICE CMNT-IMP: ABNORMAL
SODIUM SERPL-SCNC: 141 MMOL/L (ref 136–145)
SPECIMEN EXP DATE BLD: NORMAL
STATUS OF UNIT,%ST: NORMAL
STATUS OF UNIT,%ST: NORMAL
UNIT DIVISION, %UDIV: 0
UNIT DIVISION, %UDIV: 0
WBC # BLD AUTO: 15.5 K/UL (ref 4.1–11.1)

## 2018-11-23 PROCEDURE — 74011000258 HC RX REV CODE- 258: Performed by: PHYSICIAN ASSISTANT

## 2018-11-23 PROCEDURE — 82962 GLUCOSE BLOOD TEST: CPT

## 2018-11-23 PROCEDURE — 74011250636 HC RX REV CODE- 250/636: Performed by: PHYSICIAN ASSISTANT

## 2018-11-23 PROCEDURE — 74011000250 HC RX REV CODE- 250: Performed by: PHYSICIAN ASSISTANT

## 2018-11-23 PROCEDURE — 97116 GAIT TRAINING THERAPY: CPT

## 2018-11-23 PROCEDURE — 74011250637 HC RX REV CODE- 250/637: Performed by: PHYSICIAN ASSISTANT

## 2018-11-23 PROCEDURE — 71045 X-RAY EXAM CHEST 1 VIEW: CPT

## 2018-11-23 PROCEDURE — 77010033678 HC OXYGEN DAILY

## 2018-11-23 PROCEDURE — 74011250637 HC RX REV CODE- 250/637: Performed by: INTERNAL MEDICINE

## 2018-11-23 PROCEDURE — 80048 BASIC METABOLIC PNL TOTAL CA: CPT

## 2018-11-23 PROCEDURE — 97530 THERAPEUTIC ACTIVITIES: CPT

## 2018-11-23 PROCEDURE — 85027 COMPLETE CBC AUTOMATED: CPT

## 2018-11-23 PROCEDURE — 36415 COLL VENOUS BLD VENIPUNCTURE: CPT

## 2018-11-23 PROCEDURE — 65660000000 HC RM CCU STEPDOWN

## 2018-11-23 RX ORDER — ATORVASTATIN CALCIUM 40 MG/1
40 TABLET, FILM COATED ORAL DAILY
Qty: 30 TAB | Refills: 1 | Status: SHIPPED | OUTPATIENT
Start: 2018-11-24 | End: 2018-12-12

## 2018-11-23 RX ORDER — OXYCODONE HYDROCHLORIDE 5 MG/1
5 TABLET ORAL
Qty: 40 TAB | Refills: 0 | Status: SHIPPED | OUTPATIENT
Start: 2018-11-23 | End: 2018-12-12

## 2018-11-23 RX ORDER — METOPROLOL TARTRATE 25 MG/1
25 TABLET, FILM COATED ORAL EVERY 12 HOURS
Qty: 60 TAB | Refills: 1 | Status: SHIPPED | OUTPATIENT
Start: 2018-11-23 | End: 2018-12-19 | Stop reason: SDUPTHER

## 2018-11-23 RX ORDER — POTASSIUM CHLORIDE 1500 MG/1
20 TABLET, FILM COATED, EXTENDED RELEASE ORAL DAILY
Qty: 5 TAB | Refills: 0 | Status: SHIPPED | OUTPATIENT
Start: 2018-11-24 | End: 2018-11-29

## 2018-11-23 RX ORDER — METOPROLOL TARTRATE 25 MG/1
25 TABLET, FILM COATED ORAL EVERY 12 HOURS
Status: DISCONTINUED | OUTPATIENT
Start: 2018-11-23 | End: 2018-11-25 | Stop reason: HOSPADM

## 2018-11-23 RX ORDER — FUROSEMIDE 40 MG/1
40 TABLET ORAL DAILY
Qty: 5 TAB | Refills: 0 | Status: SHIPPED | OUTPATIENT
Start: 2018-11-23 | End: 2018-11-28

## 2018-11-23 RX ORDER — AMIODARONE HYDROCHLORIDE 400 MG/1
TABLET ORAL
Qty: 37 TAB | Refills: 0 | Status: SHIPPED | OUTPATIENT
Start: 2018-11-23 | End: 2018-12-18

## 2018-11-23 RX ORDER — ASPIRIN 81 MG/1
81 TABLET ORAL DAILY
Qty: 30 TAB | Refills: 11 | Status: SHIPPED
Start: 2018-11-24 | End: 2019-11-24

## 2018-11-23 RX ORDER — POLYETHYLENE GLYCOL 3350 17 G/17G
17 POWDER, FOR SOLUTION ORAL DAILY
Status: DISCONTINUED | OUTPATIENT
Start: 2018-11-23 | End: 2018-11-23

## 2018-11-23 RX ORDER — AMIODARONE HYDROCHLORIDE 200 MG/1
400 TABLET ORAL 2 TIMES DAILY
Status: DISCONTINUED | OUTPATIENT
Start: 2018-11-23 | End: 2018-11-25 | Stop reason: HOSPADM

## 2018-11-23 RX ADMIN — SENNOSIDES AND DOCUSATE SODIUM 1 TABLET: 8.6; 5 TABLET ORAL at 08:19

## 2018-11-23 RX ADMIN — POLYETHYLENE GLYCOL 3350 34 G: 17 POWDER, FOR SOLUTION ORAL at 08:17

## 2018-11-23 RX ADMIN — ASPIRIN 81 MG: 81 TABLET, COATED ORAL at 08:19

## 2018-11-23 RX ADMIN — CHLORHEXIDINE GLUCONATE 15 ML: 1.2 RINSE ORAL at 21:16

## 2018-11-23 RX ADMIN — CHLORHEXIDINE GLUCONATE 15 ML: 1.2 RINSE ORAL at 08:20

## 2018-11-23 RX ADMIN — METOPROLOL TARTRATE 25 MG: 25 TABLET ORAL at 21:16

## 2018-11-23 RX ADMIN — ALUMINUM HYDROXIDE, MAGNESIUM HYDROXIDE, AND SIMETHICONE 30 ML: 200; 200; 20 SUSPENSION ORAL at 03:56

## 2018-11-23 RX ADMIN — Medication 10 ML: at 05:23

## 2018-11-23 RX ADMIN — AMIODARONE HYDROCHLORIDE 400 MG: 200 TABLET ORAL at 17:44

## 2018-11-23 RX ADMIN — METOPROLOL TARTRATE 12.5 MG: 25 TABLET ORAL at 08:20

## 2018-11-23 RX ADMIN — AMIODARONE HYDROCHLORIDE 150 MG: 50 INJECTION, SOLUTION INTRAVENOUS at 09:24

## 2018-11-23 RX ADMIN — Medication 5 ML: at 21:16

## 2018-11-23 RX ADMIN — Medication 400 MG: at 17:44

## 2018-11-23 RX ADMIN — OXYCODONE HYDROCHLORIDE AND ACETAMINOPHEN 1000 MG: 500 TABLET ORAL at 08:19

## 2018-11-23 RX ADMIN — Medication 400 MG: at 08:20

## 2018-11-23 RX ADMIN — AMIODARONE HYDROCHLORIDE 400 MG: 200 TABLET ORAL at 11:49

## 2018-11-23 RX ADMIN — FUROSEMIDE 40 MG: 40 TABLET ORAL at 08:20

## 2018-11-23 RX ADMIN — POTASSIUM CHLORIDE 20 MEQ: 750 TABLET, EXTENDED RELEASE ORAL at 08:19

## 2018-11-23 RX ADMIN — ATORVASTATIN CALCIUM 40 MG: 40 TABLET, FILM COATED ORAL at 08:19

## 2018-11-23 RX ADMIN — PANTOPRAZOLE SODIUM 40 MG: 40 TABLET, DELAYED RELEASE ORAL at 08:19

## 2018-11-23 NOTE — PROGRESS NOTES
Spiritual Care Partner Volunteer visited patient in PCU unit on November 23, 2018. Documented by: 
MAURO Chaudhary, [facility],  [facility]  [phone])

## 2018-11-23 NOTE — PROGRESS NOTES
Problem: Mobility Impaired (Adult and Pediatric) Goal: *Acute Goals and Plan of Care (Insert Text) Physical Therapy Goals Initiated 11/21/2018 1. Patient will move from supine to sit and sit to supine , scoot up and down and roll side to side in bed with independence within 5 days. 2.  Patient will perform sit to/from stand with independence within 5 days. 3.  Patient will ambulate 656 feet with least restrictive assistive device and independence within 5 days. 4.  Patient will ascend/descend 15 stairs with single handrail(s) with independence within 5 days. 5.  Patient will perform cardiac exercises per protocol with independence within 5 days. 6.  Patient will verbally and functionally recall 3/3 sternal precautions within 5 days. physical Therapy TREATMENT Patient: Pablo Bonilla (14 y.o. male) Date: 11/23/2018 Diagnosis: NSTEMI (non-ST elevation myocardial infarction) [facility]), CAD (coronary artery disease) Procedure(s) (LRB): 
CORONARY ARTERY BYPASS GRAFT TIMES TWO  WITH RIGHT SAPHENOPUS VEIN GRAFT ( ENDOSCOPIC HARVEST ) AND LEFT INTERNAL MAMMARY ARTERY WITH EXTRA CORPOREAL CIRCULATION. DM AND EPIAORTIC ULTRASOUND DONE BY DR Guadalupe Narvaez (N/A) 3 Days Post-Op Precautions: Sternal, Fall Chart, physical therapy assessment, plan of care and goals were reviewed. ASSESSMENT: pt tolerated tx well, no LOB or SOB, does fair with bed mob and toilet transfers, does well with sternal precautions with cues, vc's for safety. Progression toward goals: 
[]      Improving appropriately and progressing toward goals [x]      Improving slowly and progressing toward goals 
[]      Not making progress toward goals and plan of care will be adjusted PLAN: 
Patient continues to benefit from skilled intervention to address the above impairments. Continue treatment per established plan of care. Discharge Recommendations:  Outpatient Cardiac Rehab Further Equipment Recommendations for Discharge:  none OBJECTIVE DATA SUMMARY:  
 
Patient mobilized on continuous portable monitor/telemetry. Critical Behavior: 
Neurologic State: Alert, Appropriate for age Orientation Level: Oriented X4 Cognition: Appropriate decision making, Appropriate for age attention/concentration, Appropriate safety awareness, Follows commands Functional Mobility Training: 
Bed Mobility: 
Log Rolling: Minimum assistance;Assist x1;Additional time Supine to Sit: Minimum assistance;Assist x1;Additional time Scooting: Stand-by assistance Level of Assistance: Minimum assistance Interventions: Tactile cues; Verbal cues Transfers: 
Sit to Stand: Contact guard assistance Stand to Sit: Contact guard assistance Bed to Chair: Contact guard assistance Interventions: Tactile cues; Verbal cues Level of Assistance: Contact guard assistance Balance: 
Sitting: Intact; Without support Standing: Intact; Without support Ambulation/Gait Training: 
Distance (ft): 200 Feet (ft) Assistive Device: Gait belt Ambulation - Level of Assistance: Contact guard assistance Gait Abnormalities: Decreased step clearance Right Side Weight Bearing: Full Left Side Weight Bearing: Full Base of Support: Widened Speed/Janeth: Pace decreased (<100 feet/min) Step Length: Left shortened;Right shortened Pain: 
Pain Scale 1: Numeric (0 - 10) Pain Intensity 1: 2 Pain Location 1: Incisional 
Pain Orientation 1: Anterior Pain Description 1: Aching; Sore Pain Intervention(s) 1: Nurse notified Activity Tolerance: fair After treatment:  
[x] Patient left in no apparent distress sitting up in chair 
[] Patient left in no apparent distress in bed 
[x] Call bell left within reach [x] Nursing notified 
[] Caregiver present 
[] Bed alarm activated COMMUNICATION/COLLABORATION:  
The patients plan of care was discussed with: Registered Nurse Damaris Nash PTA Time Calculation: 25 mins

## 2018-11-23 NOTE — PROGRESS NOTES
Walked in [place] 3rd time. Spoke to pt about taking pain pills to do what he needs to do, Encouraged to cough and deep breath brought up scant white phlegm. Bathed pt completely and changed linen. Pt has been trying to have BM without success.

## 2018-11-23 NOTE — PROGRESS NOTES
[facility] FLOOR Progress Note Admit Date: 2018 POD: 3 Days Post-Op Assessment:  
 
Principal Problem: 
  NSTEMI (non-ST elevation myocardial infarction) ([address] (2018) Active Problems: 
  Mixed hyperlipidemia (2018) Tobacco use disorder (2018) CAD (coronary artery disease), native coronary artery (2018) CAD (coronary artery disease) (2018) S/P CABG x 2 (2018) Overview: LIM[place] to LAD, SVG to OM on 2018 Procedure(s): CORONARY ARTERY BYPASS GRAFT TIMES TWO  WITH RIGHT SAPHENOPUS VEIN GRAFT ( ENDOSCOPIC HARVEST ) AND LEFT INTERNAL MAMMARY ARTERY WITH EXTRA CORPOREAL CIRCULATION. DM AND EPIAORTIC ULTRASOUND DONE BY DR Robina Gil Summary Stable hemodynamics in AFIb treated with Amio bolus with conversion to ST 70s. Feeling better WBC 15 HH 8/26  CXR: none Cr 0.7 BUN 12 UOP [id number]. Plan/Recommendations/Medical Decision Making:  
 
Increase BB Amio PO Increase activity Increase I/S effort and frequency Sternal precautions Stimulate bowel movement Patient seen and reviewed with Dr. Priscila Tatum MD 
 
 
Objective:  
 
 
Visit Vitals /58 Pulse 88 Temp 98.7 °F (37.1 °C) Resp 18 Ht 6' (1.829 m) Wt 194 lb 0.1 oz (88 kg) SpO2 95% BMI 26.31 kg/m² Temp (24hrs), Av.1 °F (37.3 °C), Min:98.7 °F (37.1 °C), Max:99.7 °F (37.6 °C) Last 3 Recorded Weights in this Encounter 18 1619 18 5693 18 4270 Weight: 187 lb 13.3 oz (85.2 kg) 199 lb 4.7 oz (90.4 kg) 194 lb 0.1 oz (88 kg) CXR Results  (Last 48 hours)  
          
 18 0837  XR CHEST PORT Final result Impression:  IMPRESSION: No significant change following removal of left chest tube. Narrative:  EXAM:  XR CHEST PORT. INDICATION: Postop heart. COMPARISON: 2018. FINDINGS:   
A portable AP radiograph of the chest was obtained at 0754 hours. There are  
sternal sutures. Lines and tubes: The patient is on a cardiac monitor. The left chest tube has  
been removed. Lungs: The lungs are clear except for atelectasis at the lung bases, left  
greater than right. Pleura: There is no pneumothorax or pleural effusion. Mediastinum: The cardiac and mediastinal contours and pulmonary vascularity are  
normal.  
Bones and soft tissues: The bones and soft tissues are grossly within normal  
limits. 11/22/18 0747  XR CHEST PORT Final result Impression:  IMPRESSION:   
   
Slight increase in mild bibasilar postoperative atelectasis. Possible small  
postoperative pleural effusions. Narrative:  EXAM:  XR CHEST PORT INDICATION:  Post op heart COMPARISON:  11/21/2018 FINDINGS:  
   
A portable AP radiograph of the chest was obtained at 17:03 hours. Mediastinal  
drains and left chest tube remain in place. There has been slight increase in  
bibasilar density which may represent mild postoperative pleural fluid and  
atelectasis. The lungs are otherwise clear. There is unchanged mild cardiomegaly but no significant vascular congestion. Lab Data Reviewed:  
Recent Labs  
  11/23/18 
0721 11/23/18 
0359  11/20/18 
1943 WBC  --  15.5*   < > 23.9* HGB  --  10.6*   < > 12.2 HCT  --  31.6*   < > 35.9*  
PLT  --  195   < > 188 NA  --  141   < > 145 K  --  3.5   < > 4.0  
BUN  --  12   < > 10 CREA  --  0.78   < > 0.95 GLU  --  127*   < > 130* GLUCPOC 134*  --    < >  --   
INR  --   --   --  1.1  
 < > = values in this interval not displayed. Last 24hr Input/Output: 
 
Intake/Output Summary (Last 24 hours) at 11/23/2018 1103 Last data filed at 11/23/2018 0892 Gross per 24 hour Intake 360 ml Output 1750 ml Net -1390 ml Admission Weight: Last Weight Weight: 187 lb 13.3 oz (85.2 kg) Weight: 194 lb 0.1 oz (88 kg) EXAM:  General: felt bad in AFIB Chest: stable sternum Incisions: dry and intact Lungs:   Clear to auscultation bilaterally. Heart:  Regular rate and rhythm, S1, S2 normal, no murmur, no click, no rub Abdomen:   Soft, non-tender. Bowel sounds present. Extremities:  No edema Neurologic:  Gross motor and sensory apparatus intact.   
 
 
Signed By: NICOLASA Valdovinos

## 2018-11-23 NOTE — PROGRESS NOTES
PCU SHIFT NURSING NOTE Bedside shift change report given to Steven Wolfe RN (oncoming nurse) by Carlito Cruz RN (offgoing nurse). Report included the following information SBAR, Kardex, Intake/Output, MAR, Recent Results and Cardiac Rhythm NSR. Shift Summary:  
1950: Pt resting in bed, no distress. Pacer wires to external pacer, VVI 60/6. Midline incision dsg c/d/i. Pt denies pain. Encouraged IS.  
0100: Pt having 1-2 paced beats intermittently on telemetry. 0200: Pt now in a fib on telemetry, -115. Pt in no distress, states he can tell his \"heart feels different\". Will monitor. 0345: Pt c/o gas pain. Will administer PRN Mylanta. Abdomen is slightly bloated, soft. 0400: Pt ambulated to bathroom, no BM, not passing flatulence. Pt ambulating in room to try to alleviate gas, bloating. After briefly walking in room, pt assisted to recliner. 0500: Pt passing flatus now. Removed midline dsg. Incision well-approximated. Wiped area c CHG. Pacer wire care completed. CHG bath and gown change completed. 0530: Pt asks to return to bed for a while.  
0700: Bedside shift change report given to Alexis Gómez RN (oncoming nurse) by Steven Wolfe RN (offgoing nurse). Report included the following information SBAR, Kardex, Intake/Output, MAR, Recent Results and Cardiac Rhythm a fib. Admission Date 11/18/2018 Admission Diagnosis NSTEMI (non-ST elevation myocardial infarction) ([facility] [address] CAD (coronary artery disease) Consults None Consults []PT []OT []Speech  
[]Case Management  
  
[] Palliative Cardiac Monitoring Order []Yes []No  
 
IV drips []Yes Drip:                            Dose: 
Drip:                            Dose: 
Drip:                            Dose:  
[]No  
 
GI Prophylaxis []Yes []No  
 
 
 
DVT Prophylaxis SCDs:  Sequential Compression Device: Bilateral  
  Patient Refused VTE Prophylaxis: Yes Adam stockings:     
  
[] Medication []Contraindicated []None Activity Level Activity Level: Up with Assistance Activity Assistance: Partial (one person) Purposeful Rounding every 1-2 hour? []Yes Wilcox Score  Total Score: 3 Bed Alarm (If score 3 or >) []Yes  
[] Refused (See signed refusal form in chart) Arsh Score  Arsh Score: 18 Arsh Score (if score 14 or less) []PMT consult  
[]Wound Care consult []Specialty bed  
[] Nutrition consult Needs prior to discharge:  
Home O2 required:   
[]Yes []No  
 If yes, how much O2 required? Other:  
 Last Bowel Movement: Last Bowel Movement Date: 11/20/18 Influenza Vaccine Received Flu Vaccine for Current Season (usually Sept-March): No  
 Patient/Guardian Refused (Notify MD): Yes Pneumonia Vaccine Diet Active Orders Diet DIET DIABETIC CONSISTENT CARB Regular; No Conc. Sweets LDAs Peripheral IV 11/18/18 Right Antecubital (Active) Site Assessment Clean, dry, & intact 11/22/2018  7:45 PM  
Phlebitis Assessment 0 11/22/2018  7:45 PM  
Infiltration Assessment 0 11/22/2018  7:45 PM  
Dressing Status Clean, dry, & intact 11/22/2018  7:45 PM  
Dressing Type Transparent 11/22/2018  7:45 PM  
Hub Color/Line Status Pink;Patent; Flushed 11/22/2018  7:45 PM  
Action Taken Open ports on tubing capped 11/22/2018  4:00 PM  
Alcohol Cap Used Yes 11/22/2018  8:00 AM  
                  
Urinary Catheter [REMOVED] Urinary Catheter 11/20/18 Almanzar - Temperature-Indications for Use: Surgery Intake & Output Date 11/22/18 0700 - 11/23/18 4288 11/23/18 0700 - 11/24/18 4915 Shift 2400-1055 3245-7696 24 Hour Total 4517-9708 2422-1466 24 Hour Total  
INTAKE  
P.O. 920  920 P. O. 920  920 Shift Total(mL/kg) 920(10.2)  920(10.2) OUTPUT Urine(mL/kg/hr) 1650(1.5)  1650 Urine Voided 1650  1650 Urine Occurrence(s) 1 x  1 x Drains 100  100 Output (ml) ([REMOVED] Neftali Drain #1 11/20/18 Mid Chest) 100  100 Shift Total(mL/kg) [id number])  [id number]) NET -830  -830 Weight (kg) 90.4 90.4 90.4 90.4 90.4 90.4 Readmission Risk Assessment Tool Score Low Risk   
      
 4 Total Score 2 . Living with Significant Other. Assisted Living. LTAC. SNF. or  
Rehab  
 2 Charlson Comorbidity Score (Age + Comorbid Conditions) Criteria that do not apply:  
 Has Seen PCP in Last 6 Months (Yes=3, No=0) Patient Length of Stay (>5 days = 3) IP Visits Last 12 Months (1-3=4, 4=9, >4=11) Pt. Coverage (Medicare=5 , Medicaid, or Self-Pay=4) Expected Length of Stay 8d 2h Actual Length of Stay 5

## 2018-11-23 NOTE — PROGRESS NOTES
Problem: Falls - Risk of 
Goal: *Absence of Falls Document April Claudia Fall Risk and appropriate interventions in the flowsheet. Outcome: Progressing Towards Goal 
Fall Risk Interventions: 
Mobility Interventions: Bed/chair exit alarm, OT consult for ADLs, Patient to call before getting OOB, PT Consult for mobility concerns, PT Consult for assist device competence, Strengthening exercises (ROM-active/passive) Mentation Interventions: Adequate sleep, hydration, pain control, Evaluate medications/consider consulting pharmacy, Family/sitter at bedside, Increase mobility, Room close to nurse's station, Toileting rounds Medication Interventions: Bed/chair exit alarm, Patient to call before getting OOB, Teach patient to arise slowly Elimination Interventions: Bed/chair exit alarm, Call light in reach, Patient to call for help with toileting needs, Toileting schedule/hourly rounds, Urinal in reach Problem: Pressure Injury - Risk of 
Goal: *Prevention of pressure injury Document Arsh Scale and appropriate interventions in the flowsheet. Outcome: Progressing Towards Goal 
Pressure Injury Interventions: 
Sensory Interventions: Discuss PT/OT consult with provider, Float heels, Keep linens dry and wrinkle-free, Maintain/enhance activity level, Minimize linen layers, Pressure redistribution bed/mattress (bed type), Turn and reposition approx. every two hours (pillows and wedges if needed) Activity Interventions: Increase time out of bed, Pressure redistribution bed/mattress(bed type), PT/OT evaluation Mobility Interventions: HOB 30 degrees or less, PT/OT evaluation, Turn and reposition approx. every two hours(pillow and wedges) Nutrition Interventions: Offer support with meals,snacks and hydration, Document food/fluid/supplement intake Friction and Shear Interventions: HOB 30 degrees or less

## 2018-11-24 ENCOUNTER — APPOINTMENT (OUTPATIENT)
Dept: GENERAL RADIOLOGY | Age: 53
DRG: 234 | End: 2018-11-24
Attending: PHYSICIAN ASSISTANT
Payer: COMMERCIAL

## 2018-11-24 LAB
ANION GAP SERPL CALC-SCNC: 5 MMOL/L (ref 5–15)
BUN SERPL-MCNC: 14 MG/DL (ref 6–20)
BUN/CREAT SERPL: 16 (ref 12–20)
CALCIUM SERPL-MCNC: 8 MG/DL (ref 8.5–10.1)
CHLORIDE SERPL-SCNC: 105 MMOL/L (ref 97–108)
CO2 SERPL-SCNC: 31 MMOL/L (ref 21–32)
CREAT SERPL-MCNC: 0.9 MG/DL (ref 0.7–1.3)
GLUCOSE SERPL-MCNC: 110 MG/DL (ref 65–100)
POTASSIUM SERPL-SCNC: 3.4 MMOL/L (ref 3.5–5.1)
SODIUM SERPL-SCNC: 141 MMOL/L (ref 136–145)

## 2018-11-24 PROCEDURE — 97116 GAIT TRAINING THERAPY: CPT

## 2018-11-24 PROCEDURE — 74011250637 HC RX REV CODE- 250/637: Performed by: PHYSICIAN ASSISTANT

## 2018-11-24 PROCEDURE — 65660000000 HC RM CCU STEPDOWN

## 2018-11-24 PROCEDURE — 36415 COLL VENOUS BLD VENIPUNCTURE: CPT

## 2018-11-24 PROCEDURE — 74011000250 HC RX REV CODE- 250: Performed by: PHYSICIAN ASSISTANT

## 2018-11-24 PROCEDURE — 80048 BASIC METABOLIC PNL TOTAL CA: CPT

## 2018-11-24 PROCEDURE — 71045 X-RAY EXAM CHEST 1 VIEW: CPT

## 2018-11-24 PROCEDURE — 97110 THERAPEUTIC EXERCISES: CPT

## 2018-11-24 PROCEDURE — 74011250637 HC RX REV CODE- 250/637: Performed by: INTERNAL MEDICINE

## 2018-11-24 RX ADMIN — CHLORHEXIDINE GLUCONATE 15 ML: 1.2 RINSE ORAL at 20:56

## 2018-11-24 RX ADMIN — POTASSIUM CHLORIDE 20 MEQ: 750 TABLET, EXTENDED RELEASE ORAL at 09:32

## 2018-11-24 RX ADMIN — PANTOPRAZOLE SODIUM 40 MG: 40 TABLET, DELAYED RELEASE ORAL at 09:32

## 2018-11-24 RX ADMIN — CHLORHEXIDINE GLUCONATE 15 ML: 1.2 RINSE ORAL at 09:33

## 2018-11-24 RX ADMIN — Medication 400 MG: at 17:19

## 2018-11-24 RX ADMIN — AMIODARONE HYDROCHLORIDE 400 MG: 200 TABLET ORAL at 09:32

## 2018-11-24 RX ADMIN — FUROSEMIDE 40 MG: 40 TABLET ORAL at 09:33

## 2018-11-24 RX ADMIN — ASPIRIN 81 MG: 81 TABLET, COATED ORAL at 09:32

## 2018-11-24 RX ADMIN — OXYCODONE HYDROCHLORIDE AND ACETAMINOPHEN 1000 MG: 500 TABLET ORAL at 09:32

## 2018-11-24 RX ADMIN — POLYETHYLENE GLYCOL 3350 34 G: 17 POWDER, FOR SOLUTION ORAL at 09:33

## 2018-11-24 RX ADMIN — ATORVASTATIN CALCIUM 40 MG: 40 TABLET, FILM COATED ORAL at 09:32

## 2018-11-24 RX ADMIN — METOPROLOL TARTRATE 25 MG: 25 TABLET ORAL at 20:55

## 2018-11-24 RX ADMIN — SENNOSIDES AND DOCUSATE SODIUM 1 TABLET: 8.6; 5 TABLET ORAL at 09:32

## 2018-11-24 RX ADMIN — METOPROLOL TARTRATE 25 MG: 25 TABLET ORAL at 09:32

## 2018-11-24 RX ADMIN — AMIODARONE HYDROCHLORIDE 400 MG: 200 TABLET ORAL at 17:19

## 2018-11-24 RX ADMIN — Medication 400 MG: at 09:32

## 2018-11-24 NOTE — PROGRESS NOTES
[facility] FLOOR Progress Note Admit Date: 2018 POD: 4 Days Post-Op Procedure:  Procedure(s): CORONARY ARTERY BYPASS GRAFT TIMES TWO  WITH RIGHT SAPHENOPUS VEIN GRAFT ( ENDOSCOPIC HARVEST ) AND LEFT INTERNAL MAMMARY ARTERY WITH EXTRA CORPOREAL CIRCULATION. DM AND EPIAORTIC ULTRASOUND DONE BY DR Danny Quinones Subjective:  
 
Issues with A-fib; still a little wore out Objective:  
 
Blood pressure 129/77, pulse 83, temperature 98.4 °F (36.9 °C), resp. rate 18, height 6' (1.829 m), weight 188 lb 15 oz (85.7 kg), SpO2 92 %. Temp (24hrs), Av.5 °F (36.9 °C), Min:98.4 °F (36.9 °C), Max:98.7 °F (37.1 °C) Admission Weight: Last Weight Weight: 187 lb 13.3 oz (85.2 kg) Weight: 188 lb 15 oz (85.7 kg) Intake / Output / Drain: 
Current Shift:  1901 -  0700 In: -  
Out: 150 [Urine:150] Last 24 hrs.:  0701 -  190 In: 200 [P.O.:980] Out: 2500 [Urine:2400; Drains:100] EXAM: 
Visit Vitals /77 (BP 1 Location: Right arm, BP Patient Position: At rest) Pulse 83 Temp 98.4 °F (36.9 °C) Resp 18 Ht 6' (1.829 m) Wt 188 lb 15 oz (85.7 kg) SpO2 92% BMI 25.62 kg/m² Labs: 
Recent Results (from the past 24 hour(s)) GLUCOSE, POC Collection Time: 18  7:21 AM  
Result Value Ref Range Glucose (POC) 134 (H) 65 - 100 mg/dL Performed by SONG LAYNE(CON) A/P Home likely  or Monday Monitor for A-fib Signed By: Windsor Snellen, MD

## 2018-11-24 NOTE — PROGRESS NOTES
Problem: Mobility Impaired (Adult and Pediatric) Goal: *Acute Goals and Plan of Care (Insert Text) Physical Therapy Goals Initiated 11/21/2018 1. Patient will move from supine to sit and sit to supine , scoot up and down and roll side to side in bed with independence within 5 days. 2.  Patient will perform sit to/from stand with independence within 5 days. 3.  Patient will ambulate 656 feet with least restrictive assistive device and independence within 5 days. 4.  Patient will ascend/descend 15 stairs with single handrail(s) with independence within 5 days. 5.  Patient will perform cardiac exercises per protocol with independence within 5 days. 6.  Patient will verbally and functionally recall 3/3 sternal precautions within 5 days. physical Therapy TREATMENT Patient: Rama Cheatham (62 y.o. male) Date: 11/24/2018 Diagnosis: NSTEMI (non-ST elevation myocardial infarction) ([address] CAD (coronary artery disease) NSTEMI (non-ST elevation myocardial infarction) ([address] Procedure(s) (LRB): 
CORONARY ARTERY BYPASS GRAFT TIMES TWO  WITH RIGHT SAPHENOPUS VEIN GRAFT ( ENDOSCOPIC HARVEST ) AND LEFT INTERNAL MAMMARY ARTERY WITH EXTRA CORPOREAL CIRCULATION. DM AND EPIAORTIC ULTRASOUND DONE BY DR Danny Quinones (N/A) 4 Days Post-Op Precautions: Sternal, Fall Chart, physical therapy assessment, plan of care and goals were reviewed. ASSESSMENT: 
Pt cleared by nurse to mobilize. Pt received in up in recliner. Pt agreeable to therapy. Pt performed sit to stand transfer at [address] Pt ambulated 250ft with no AD at CGA/SBA. Pt ascended and descended 15 steps with the right railing at CGA/SBA step over step. Pt with minimal SOB but reported he did not need a rest break. Pt performed exercises once back in room. Pt left up in chair with all needs met to eat breakfast. Pt will benefit from Outpatient cardiac rehab to improve endurance. Progression toward goals: [x]      Improving appropriately and progressing toward goals 
[]      Improving slowly and progressing toward goals 
[]      Not making progress toward goals and plan of care will be adjusted PLAN: 
Patient continues to benefit from skilled intervention to address the above impairments. Continue treatment per established plan of care. Discharge Recommendations:  Outpatient Cardiac Rehab Further Equipment Recommendations for Discharge:  None SUBJECTIVE:  
Patient stated Damaris Dowell will probably be here till tomorrow.  The patient stated 3/3 sternal precautions. Reviewed all 3 with patient. OBJECTIVE DATA SUMMARY:  
Patient mobilized on continuous portable monitor/telemetry. Critical Behavior: 
Neurologic State: Alert, Appropriate for age Orientation Level: Oriented X4 Cognition: Appropriate decision making, Appropriate for age attention/concentration, Appropriate safety awareness, Follows commands Functional Mobility Training: 
Bed Mobility: Log   
  
  
  
  
  
Transfers: 
Sit to Stand: Stand-by assistance Stand to Sit: Stand-by assistance Balance: 
Sitting: Intact; Without support Standing: Intact; Without supportAmbulation/Gait Training: 
Distance (ft): 250 Feet (ft) Assistive Device: Gait belt Ambulation - Level of Assistance: Contact guard assistance;Stand-by assistance Gait Abnormalities: Decreased step clearance Base of Support: Widened Speed/Janeth: Pace decreased (<100 feet/min) Step Length: Right shortened;Left shortened Stairs: 
Number of Stairs Trained: 15 Stairs - Level of Assistance: Contact guard assistance;Stand-by assistance Rail Use: Right Patient instructed as part of their cardiac daily wellness check to step on scale. Patient demonstrated stepping on and off scale with Independent and with/without use of upper extremities for stability. Therapeutic Exercises: Patient instructed on the benefits and demonstrated cardiac exercises while sitting with supervision. Instructed and indicated understanding on how to progress reps and sets against gravity, working up to 5 lbs and so on based on surgeon clearance for more weight in prep for functional activity. Can use household items for weights. CARDIAC EXERCISE Sets Reps Active Active Assist  
Passive Self ROM Comments Shoulder flexion 1 5 []                                            []                                            []                                            []                                              
Shoulder abduction 1  5 []                                            []                                            []                                            []                                              
Scapular elevation 1 5 []                                            []                                            []                                            []                                              
Scapular retraction 1 5 []                                            []                                            []                                            []                                              
Trunk rotation 1 5 []                                            []                                            []                                            []                                              
Trunk sidebending 1 5 []                                            []                                            []                                            []                                              
   []                                            []                                            []                                            []                                              
 
Pain: 
Pain Scale 1: Numeric (0 - 10) Pain Intensity 1: 0 
  
  
  
  
 Activity Tolerance: Pt with improved mobility tolerance and safety. After treatment:  
[x] Patient left in no apparent distress sitting up in chair 
[] Patient left in no apparent distress in bed 
[x] Call bell left within reach [x] Nursing notified 
[] Caregiver present 
[] Bed alarm activated COMMUNICATION/COLLABORATION:  
The patients plan of care was discussed with: Registered Nurse Binh Brooke Time Calculation: 23 mins

## 2018-11-24 NOTE — PROGRESS NOTES
Initial Nutrition Assessment: 
 
INTERVENTIONS/RECOMMENDATIONS:  
· Continue current diet ASSESSMENT:  
Chart reviewed, medically note for s/p CABG. Assessing pt due to LOS. Pt reports poor PO intake prior to today however is feeling much better and consumed the majority of his breakfast. He declined nutrition supplements at this time. History reviewed. No pertinent past medical history. Diet Order: Cardiac 
% Eaten:   
Patient Vitals for the past 72 hrs: 
 % Diet Eaten 11/24/18 1047 100 % 11/23/18 0957 0 % 11/22/18 1213 10 % 11/22/18 0842 10 % 11/22/18 0836 20 % 11/21/18 1715 10 % Pertinent Medications: [x]Reviewed: vit C, lasix, Mg-ox, PPI, miralax, KCl, pericolace, Pertinent Labs: [x]Reviewed: K+ 3.4 Food Allergies: [x]NKFA  []Other Last BM: 11/23 Edema:        []RUE   []LUE   []RLE   []LLE Pressure Injury:      [] Stage I   [] Stage II   [] Stage III   [] Stage IV Wt Readings from Last 30 Encounters:  
11/24/18 85.7 kg (188 lb 15 oz)  
10/02/18 85.8 kg (189 lb 2.5 oz) Anthropometrics:  
Height: 6' (182.9 cm) Weight: 85.7 kg (188 lb 15 oz) IBW (%IBW):   ( ) UBW (%UBW):   (  %) Last Weight Metrics: 
Weight Loss Metrics 11/24/2018 10/2/2018 Today's Wt 188 lb 15 oz 189 lb 2.5 oz  
BMI 25.62 kg/m2 25.65 kg/m2 BMI: Body mass index is 25.62 kg/m². This BMI is indicative of: 
 []Underweight    []Normal    [x]Overweight    [] Obesity   [] Extreme Obesity (BMI>40) Estimated Nutrition Needs (Based on):  
6440 Kcals/day(BMR: 1725 x 1.3) , 85 g(1 g/kg) Protein Carbohydrate: At Least 130 g/day  Fluids: 2240 mL/day (1ml/kcal) or per primary team 
 
NUTRITION DIAGNOSES:  
Problem:  Inadequate protein-energy intake Etiology: related to poor appetite Signs/Symptoms: as evidenced by pt consume <25% of meals until today NUTRITION INTERVENTIONS: 
Meals/Snacks: General/healthful diet GOAL:  
consume >75% of meals in 3-5 days LEARNING NEEDS (Diet, Food/Nutrient-Drug Interaction):  
 [x] None Identified 
 [] Identified and Education Provided/Documented 
 [] Identified and Pt declined/was not appropriate Cultureal, Christianity, OR Ethnic Dietary Needs:  
 [x] None Identified 
 [] Identified and Addressed 
 
 [x] Interdisciplinary Care Plan Reviewed/Documented  
 [x] Discharge Planning:  Heart healthy diet MONITORING /EVALUATION:  
  
Food/Nutrient Intake Outcomes: Total energy intake Physical Signs/Symptoms Outcomes: Electrolyte and renal profile, Weight/weight change, GI 
 
NUTRITION RISK:  
 [] High              [x] Moderate           []  Low  []  Minimal/Uncompromised PT SEEN FOR:  
 []  MD Consult: []Calorie Count []Diabetic Diet Education []Diet Education []Electrolyte Management []General Nutrition Management and Supplements []Management of Tube Feeding []TPN Recommendations []  RN Referral:  []MST score >=2 
   []Enteral/Parenteral Nutrition PTA []Pregnant: Gestational DM or Multigestation 
   []Pressure Ulcer/Wound Care needs 
     
[]  Low BMI [x]  LOS Referral  
 
 
Josh Gordon RDN Pager [phone] Weekend Pager [phone]

## 2018-11-25 ENCOUNTER — HOME HEALTH ADMISSION (OUTPATIENT)
Dept: HOME HEALTH SERVICES | Facility: HOME HEALTH | Age: 53
End: 2018-11-25
Payer: COMMERCIAL

## 2018-11-25 ENCOUNTER — APPOINTMENT (OUTPATIENT)
Dept: GENERAL RADIOLOGY | Age: 53
DRG: 234 | End: 2018-11-25
Attending: PHYSICIAN ASSISTANT
Payer: COMMERCIAL

## 2018-11-25 VITALS
DIASTOLIC BLOOD PRESSURE: 90 MMHG | HEART RATE: 85 BPM | SYSTOLIC BLOOD PRESSURE: 129 MMHG | OXYGEN SATURATION: 96 % | RESPIRATION RATE: 18 BRPM | BODY MASS INDEX: 25.23 KG/M2 | WEIGHT: 186.29 LBS | TEMPERATURE: 98.5 F | HEIGHT: 72 IN

## 2018-11-25 PROCEDURE — 74011000250 HC RX REV CODE- 250: Performed by: PHYSICIAN ASSISTANT

## 2018-11-25 PROCEDURE — 71045 X-RAY EXAM CHEST 1 VIEW: CPT

## 2018-11-25 PROCEDURE — 74011250637 HC RX REV CODE- 250/637: Performed by: PHYSICIAN ASSISTANT

## 2018-11-25 PROCEDURE — 97530 THERAPEUTIC ACTIVITIES: CPT

## 2018-11-25 PROCEDURE — 74011250637 HC RX REV CODE- 250/637: Performed by: INTERNAL MEDICINE

## 2018-11-25 RX ADMIN — Medication 400 MG: at 09:07

## 2018-11-25 RX ADMIN — SENNOSIDES AND DOCUSATE SODIUM 1 TABLET: 8.6; 5 TABLET ORAL at 09:07

## 2018-11-25 RX ADMIN — METOPROLOL TARTRATE 25 MG: 25 TABLET ORAL at 09:07

## 2018-11-25 RX ADMIN — PANTOPRAZOLE SODIUM 40 MG: 40 TABLET, DELAYED RELEASE ORAL at 08:02

## 2018-11-25 RX ADMIN — ASPIRIN 81 MG: 81 TABLET, COATED ORAL at 09:07

## 2018-11-25 RX ADMIN — CHLORHEXIDINE GLUCONATE 15 ML: 1.2 RINSE ORAL at 09:08

## 2018-11-25 RX ADMIN — POTASSIUM CHLORIDE 20 MEQ: 750 TABLET, EXTENDED RELEASE ORAL at 09:07

## 2018-11-25 RX ADMIN — OXYCODONE HYDROCHLORIDE AND ACETAMINOPHEN 1000 MG: 500 TABLET ORAL at 09:07

## 2018-11-25 RX ADMIN — FUROSEMIDE 40 MG: 40 TABLET ORAL at 09:06

## 2018-11-25 RX ADMIN — ATORVASTATIN CALCIUM 40 MG: 40 TABLET, FILM COATED ORAL at 09:07

## 2018-11-25 RX ADMIN — AMIODARONE HYDROCHLORIDE 400 MG: 200 TABLET ORAL at 09:07

## 2018-11-25 RX ADMIN — Medication 5 ML: at 06:25

## 2018-11-25 NOTE — PROGRESS NOTES
Reviewed discharge instruction with pt and then with spouse on arrival .  Verbalized understanding and discharge by wheelchair to lobby.

## 2018-11-25 NOTE — PROGRESS NOTES
CM sent the patient's referral to [address] [place] [address] for review and potential acceptance for wound care needs. CM will continue to follow the patient for dispo needs. Chayito Damian LCSW

## 2018-11-25 NOTE — PROGRESS NOTES
Physical Therapy Goals Initiated 11/21/2018 1. Patient will move from supine to sit and sit to supine , scoot up and down and roll side to side in bed with independence within 5 days. 2. Patient will perform sit to/from stand with independence within 5 days. 3. Patient will ambulate 656 feet with least restrictive assistive device and independence within 5 days. 4. Patient will ascend/descend 15 stairs with single handrail(s) with independence within 5 days. 5. Patient will perform cardiac exercises per protocol with independence within 5 days. 6. Patient will verbally and functionally recall 3/3 sternal precautions within 5 days. physical Therapy TREATMENT Patient: Areli Aponte (24 y.o. male) Date: 11/25/2018 Diagnosis: NSTEMI (non-ST elevation myocardial infarction) ([facility] [address] CAD (coronary artery disease) NSTEMI (non-ST elevation myocardial infarction) ([facility] [address] Procedure(s) (LRB): 
CORONARY ARTERY BYPASS GRAFT TIMES TWO  WITH RIGHT SAPHENOPUS VEIN GRAFT ( ENDOSCOPIC HARVEST ) AND LEFT INTERNAL MAMMARY ARTERY WITH EXTRA CORPOREAL CIRCULATION. DM AND EPIAORTIC ULTRASOUND DONE BY DR Yasmin Walker (N/A) 5 Days Post-Op Precautions: Sternal, Fall Chart, physical therapy assessment, plan of care and goals were reviewed. ASSESSMENT: 
Patient ready for DC to home from PT standpoint. Today's session focused solely on bed mobility, with patient receiving education then returning demonstration on technique while maintaining sternal precautions. In addition, reviewed exercise guidelines at DC to complete prior to starting cardiac rehab, to include review of RPE/Talk Test/HR monitoring with progression up to 30'/day of continuous exercise. At patient's request, provided written handout of education. He declines to participate in stair or gait training. OP cardiac rehab recommended. Progression toward goals: 
[x]      Improving appropriately and progressing toward goals []      Improving slowly and progressing toward goals 
[]      Not making progress toward goals and plan of care will be adjusted PLAN: 
Patient continues to benefit from skilled intervention to address the above impairments. Continue treatment per established plan of care. Discharge Recommendations:  Outpatient Cardiac Rehab Further Equipment Recommendations for Discharge:  none SUBJECTIVE:  
Patient stated That makes a whole lot of sense.  The patient stated 3/3 sternal precautions. Reviewed all 3 with patient. OBJECTIVE DATA SUMMARY:  
Patient mobilized on continuous portable monitor/telemetry. Critical Behavior: 
Neurologic State: Alert, Appropriate for age Orientation Level: Oriented X4 Cognition: Appropriate decision making, Appropriate for age attention/concentration, Appropriate safety awareness, Follows commands Functional Mobility Training: 
Bed Mobility: Reviewed keeping elbows 'in the tube' to ensure maintenance of sternal precautions, LE placement to assist trunk, correct breathing strategy, rest periods Log Rolling: Supervision Supine to Sit: Supervision Sit to Supine: Supervision Scooting: Supervision Transfers: 
Sit to Stand: Independent Stand to Sit: Independent Balance: 
Sitting: Intact Standing: Intact; Without support Pain: 
Pain Scale 1: Numeric (0 - 10) Pain Intensity 1: 0 Activity Tolerance: WNL Please refer to the flowsheet for vital signs taken during this treatment. After treatment:  
[x] Patient left in no apparent distress sitting up in chair 
[] Patient left in no apparent distress in bed 
[x] Call bell left within reach [x] Nursing notified 
[] Caregiver present 
[] Bed alarm activated COMMUNICATION/COLLABORATION:  
The patients plan of care was discussed with: Registered Nurse Rina Ariza, PT, DPT Board-Certified Geriatric Clinical Specialist  
 Certified Exercise Expert for Aging Adults Time Calculation: 30 mins

## 2018-11-25 NOTE — PROGRESS NOTES
PCU SHIFT NURSING NOTE Bedside shift change report given to Eliot (oncoming nurse) by Fabi Modi (offgoing nurse). Report included the following information SBAR, Kardex and Cardiac Rhythm NSR. Shift Summary: Uneventful; pt anticipating discharge. Pt remains in NSR, on room air. No c/o pain. Admission Date 11/18/2018 Admission Diagnosis NSTEMI (non-ST elevation myocardial infarction) ([facility] [address] CAD (coronary artery disease) Consults None Consults [x]PT [x]OT []Speech [x]Case Management  
  
[] Palliative Cardiac Monitoring Order  
[x]Yes []No  
 
IV drips []Yes Drip:                            Dose: 
Drip:                            Dose: 
Drip:                            Dose:  
[x]No  
 
GI Prophylaxis []Yes []No  
 
 
 
DVT Prophylaxis SCDs:  Sequential Compression Device: Bilateral  
  Patient Refused VTE Prophylaxis: Yes Adam stockings:     
  
[] Medication []Contraindicated  
[x]None Activity Level Activity Level: Up with Assistance Activity Assistance: Partial (one person) Purposeful Rounding every 1-2 hour? [x]Yes Wilcox Score  Total Score: 3 Bed Alarm (If score 3 or >) []Yes  
[] Refused (See signed refusal form in chart) Arsh Score  Arsh Score: 20 Arsh Score (if score 14 or less) []PMT consult  
[]Wound Care consult []Specialty bed  
[] Nutrition consult Needs prior to discharge:  
Home O2 required:   
[]Yes  
[x]No  
 If yes, how much O2 required? Other:  
 Last Bowel Movement: Last Bowel Movement Date: 11/24/18 Influenza Vaccine Received Flu Vaccine for Current Season (usually Sept-March): No  
 Patient/Guardian Refused (Notify MD): Yes Pneumonia Vaccine Diet Active Orders Diet DIET CARDIAC Regular LDAs Peripheral IV 11/18/18 Right Antecubital (Active) Site Assessment Clean, dry, & intact 11/25/2018  4:00 AM  
Phlebitis Assessment 0 11/25/2018  4:00 AM  
 Infiltration Assessment 0 11/25/2018  4:00 AM  
Dressing Status Clean, dry, & intact 11/25/2018  4:00 AM  
Dressing Type Transparent;Tape 11/25/2018  4:00 AM  
Hub Color/Line Status Pink;Capped 11/25/2018  4:00 AM  
Action Taken Open ports on tubing capped 11/22/2018  4:00 PM  
Alcohol Cap Used Yes 11/22/2018  8:00 AM  
                  
Urinary Catheter [REMOVED] Urinary Catheter 11/20/18 Almanzar - Temperature-Indications for Use: Surgery Intake & Output Date 11/24/18 0700 - 11/25/18 8559 11/25/18 0700 - 11/26/18 5210 Shift 5027-0971 0385-2378 24 Hour Total 8051-1381 7410-6503 24 Hour Total  
INTAKE  
P.O. 720 240 960     
  P. O. 720 240 960 Shift Total(mL/kg) 720(8.4) 240(2.8) [id number]) OUTPUT Urine(mL/kg/hr) 1000(1) 600 1600 Urine Voided 0842 631 3296 Urine Occurrence(s) 1 x  1 x Stool Stool Occurrence(s)  0 x 0 x Shift Total(mL/kg) 1000(11.7) 600(7) 1600(18.7) NET -280 -360 -640 Weight (kg) 85.7 85.7 85.7 85.7 85.7 85.7 Readmission Risk Assessment Tool Score Low Risk   
      
 7 Total Score 2 . Living with Significant Other. Assisted Living. LTAC. SNF. or  
Rehab  
 3 Patient Length of Stay (>5 days = 3) 2 Charlson Comorbidity Score (Age + Comorbid Conditions) Criteria that do not apply:  
 Has Seen PCP in Last 6 Months (Yes=3, No=0) IP Visits Last 12 Months (1-3=4, 4=9, >4=11) Pt. Coverage (Medicare=5 , Medicaid, or Self-Pay=4) Expected Length of Stay 8d 2h Actual Length of Stay 7

## 2018-11-26 ENCOUNTER — HOME CARE VISIT (OUTPATIENT)
Dept: SCHEDULING | Facility: HOME HEALTH | Age: 53
End: 2018-11-26
Payer: COMMERCIAL

## 2018-11-26 VITALS
TEMPERATURE: 98.2 F | HEART RATE: 75 BPM | SYSTOLIC BLOOD PRESSURE: 122 MMHG | DIASTOLIC BLOOD PRESSURE: 78 MMHG | RESPIRATION RATE: 18 BRPM | HEIGHT: 72 IN | BODY MASS INDEX: 24.52 KG/M2 | WEIGHT: 181 LBS | OXYGEN SATURATION: 96 %

## 2018-11-26 PROCEDURE — G0299 HHS/HOSPICE OF RN EA 15 MIN: HCPCS

## 2018-11-28 ENCOUNTER — TELEPHONE (OUTPATIENT)
Dept: CARDIOLOGY CLINIC | Age: 53
End: 2018-11-28

## 2018-11-28 ENCOUNTER — HOME CARE VISIT (OUTPATIENT)
Dept: SCHEDULING | Facility: HOME HEALTH | Age: 53
End: 2018-11-28
Payer: COMMERCIAL

## 2018-11-28 VITALS
DIASTOLIC BLOOD PRESSURE: 72 MMHG | RESPIRATION RATE: 16 BRPM | SYSTOLIC BLOOD PRESSURE: 126 MMHG | TEMPERATURE: 98.8 F | OXYGEN SATURATION: 97 % | HEART RATE: 79 BPM

## 2018-11-28 PROCEDURE — G0300 HHS/HOSPICE OF LPN EA 15 MIN: HCPCS

## 2018-11-28 NOTE — TELEPHONE ENCOUNTER
Contacted patient recommend call to  [facility] has appt 12/3/18 scheduled post surgery.

## 2018-11-28 NOTE — TELEPHONE ENCOUNTER
Pt had cath and bypass 11/20 and reports 8 lb weight loss since Monday. Home health nurse came today and he reports his vitals were 122/80, hr 75, o2 94, temp 98.8  Please advise.   Thanks, [employer] Meghann

## 2018-11-30 NOTE — DISCHARGE SUMMARY
[facility] Discharge Summary     Patient ID:  Romana Martinez  [id number]  74 y.o.  1965    Admit date: 11/18/2018    Discharge date: 11/30/2018     Admitting Physician: Marvin Jc MD     Referring Cardiologist:  Dr. Michelle Smith    PCP:  Dr. Brandy Fernandez    Admitting Diagnoses: CAD    Discharge Diagnoses:     Hospital Problems  Date Reviewed: 11/20/2018          Codes Class Noted POA    S/P CABG x 2 ICD-10-CM: Z95.1  ICD-9-CM: V45.81  11/21/2018 Unknown    Overview Signed 11/21/2018 11:26 PM by Oscar Covington MD     LIMA to LAD, SVG to OM on 11/20/2018             CAD (coronary artery disease) ICD-10-CM: I25.10  ICD-9-CM: 414.00  11/20/2018 Unknown        CAD (coronary artery disease), native coronary artery ICD-10-CM: I25.10  ICD-9-CM: 414.01  11/19/2018 Unknown        * (Principal) NSTEMI (non-ST elevation myocardial infarction) [facility]) ICD-10-CM: I21.4  ICD-9-CM: 410.70  11/18/2018 Unknown        Mixed hyperlipidemia ICD-10-CM: T71.3  ICD-9-CM: 272.2  11/18/2018 Unknown        Tobacco use disorder ICD-10-CM: F17.200  ICD-9-CM: 305.1  11/18/2018 Unknown              Procedures for this admission:  Procedure(s):  CORONARY ARTERY BYPASS GRAFT TIMES TWO  WITH RIGHT SAPHENOPUS VEIN GRAFT ( ENDOSCOPIC HARVEST ) AND LEFT INTERNAL MAMMARY ARTERY WITH EXTRA CORPOREAL CIRCULATION. DM AND EPIAORTIC ULTRASOUND DONE BY DR Alvino Dunlap    Discharge Condition:  stable    Discharge Medications:   Discharge Medication List as of 11/25/2018  9:49 AM      START taking these medications    Details   amiodarone (PACERONE) 400 mg tablet 400 mg BID  X 7 days then 400 mg dailly for 23 days, Print, Disp-37 Tab, R-0      aspirin delayed-release 81 mg tablet Take 1 Tab by mouth daily. , No Print, Disp-30 Tab, R-11      atorvastatin (LIPITOR) 40 mg tablet Take 1 Tab by mouth daily for 60 days. , Print, Disp-30 Tab, R-1      furosemide (LASIX) 40 mg tablet Take 1 Tab by mouth daily for 5 days. , Print, Disp-5 Tab, R-0      metoprolol tartrate (LOPRESSOR) 25 mg tablet Take 1 Tab by mouth every twelve (12) hours for 60 days. , Print, Disp-60 Tab, R-1      oxyCODONE IR (ROXICODONE) 5 mg immediate release tablet Take 1 Tab by mouth every six (6) hours as needed. Max Daily Amount: 20 mg., Print, Disp-40 Tab, R-0      potassium chloride SR (K-TAB) 20 mEq tablet Take 1 Tab by mouth daily for 5 days. , Print, Disp-5 Tab, R-0           HPI:  Chief Complaint: chest pain     Tanya Del Cid is a 46 y.o. NSTEMI, non hypertensive, non diabetic, cigarette smoking, cau  male who was referred for opinion and advise regarding coronary revascularization  by Dr. Akosua Urban / Mery Singer MD.      Patient is a  at a golf course and walks every day.     Patient first noted chest tightness two weeks ago while pushing wife's wheelchair. Pain relieved with time/rest. In past 24 hrs. patient noted exertional chest pain with left arm radiation relieved with rest x 2. Denies nausea, vomiting, claudication, palpitations, PND or syncope.      ER exam revealed no EKG changes but elevated troponin of 2. Patient underwent cath this morning     Cardiac Testing:      Echocardiogram done     Cardiac catheretization films were personally reviewed  Tight LM  LV 40%    Hospital Course: Underwent CABGx2 with Dr. Jose Garcia 11-20-18. Transferred to CVICU in stable condition. Extubated 11-20-18 at 2316. POD#1:  Volume to wean Timur off  Transfer to stepdown  DC central lines and kilgore  Meds: aspirin/statin  B-Blocker held for bradycardia  ACE held for hypotension  Anticipated acute blood loss anemia  Increase activity  Increase I/S effort and frequency  Sternal precautions  Stimulate bowel movement    POD#2:  Up in chair  NSR  Incisions dry  CT min drainage  Plan  DC CT ambulate    POD#3:  Increase BB   Amio PO  Increase activity  Increase I/S effort and frequency  Sternal precautions  Stimulate bowel movement    Referral to outpatient cardiac rehab.      Discharge Vital Signs: Visit Vitals  /90   Pulse 85   Temp 98.5 °F (36.9 °C)   Resp 18   Ht 6' (1.829 m)   Wt 186 lb 4.6 oz (84.5 kg)   SpO2 96%   BMI 25.27 kg/m²       Labs: No results for input(s): WBC, HGB, HCT, PLT, NA, K, BUN, CREA, GLU, GLUCPOC, INR, HGBEXT, HCTEXT, PLTEXT in the last 72 hours. No lab exists for component: GLPOC, INREXT    Diagnostics:   CXR:   IMPRESSION:  1. Slightly improved aeration in both bases. Patient Instructions:  Discharge instructions were reviewed with the patient and family present. Questions were also answered at this time. Prescriptions and medications were reviewed. The patient has a follow up appointment with the Nurse Practitioner or Physician Assistant and second follow up with physician. The patient was also instructed to follow up with his primary care physician as needed. The patient and family were encouraged to call with any questions or concerns.        Signed:  NICOLASA Joel  11/30/2018  2:07 PM

## 2018-12-01 ENCOUNTER — HOME CARE VISIT (OUTPATIENT)
Dept: SCHEDULING | Facility: HOME HEALTH | Age: 53
End: 2018-12-01
Payer: COMMERCIAL

## 2018-12-01 VITALS
SYSTOLIC BLOOD PRESSURE: 110 MMHG | WEIGHT: 174 LBS | BODY MASS INDEX: 23.6 KG/M2 | DIASTOLIC BLOOD PRESSURE: 80 MMHG | HEART RATE: 75 BPM | OXYGEN SATURATION: 97 % | TEMPERATURE: 98.9 F | RESPIRATION RATE: 18 BRPM

## 2018-12-01 PROCEDURE — G0299 HHS/HOSPICE OF RN EA 15 MIN: HCPCS

## 2018-12-03 ENCOUNTER — OFFICE VISIT (OUTPATIENT)
Dept: CARDIOTHORACIC SURGERY | Age: 53
End: 2018-12-03

## 2018-12-03 ENCOUNTER — HOME CARE VISIT (OUTPATIENT)
Dept: SCHEDULING | Facility: HOME HEALTH | Age: 53
End: 2018-12-03
Payer: COMMERCIAL

## 2018-12-03 VITALS
TEMPERATURE: 98.6 F | RESPIRATION RATE: 18 BRPM | SYSTOLIC BLOOD PRESSURE: 104 MMHG | WEIGHT: 174 LBS | HEART RATE: 78 BPM | OXYGEN SATURATION: 97 % | DIASTOLIC BLOOD PRESSURE: 80 MMHG | BODY MASS INDEX: 23.6 KG/M2

## 2018-12-03 VITALS — SYSTOLIC BLOOD PRESSURE: 109 MMHG | HEART RATE: 91 BPM | DIASTOLIC BLOOD PRESSURE: 71 MMHG | OXYGEN SATURATION: 99 %

## 2018-12-03 DIAGNOSIS — Z95.1 S/P CABG X 2: Primary | ICD-10-CM

## 2018-12-03 PROCEDURE — G0299 HHS/HOSPICE OF RN EA 15 MIN: HCPCS

## 2018-12-03 RX ORDER — ONDANSETRON 4 MG/1
4 TABLET, ORALLY DISINTEGRATING ORAL
Qty: 30 TAB | Refills: 0 | Status: SHIPPED | OUTPATIENT
Start: 2018-12-03 | End: 2018-12-12

## 2018-12-03 NOTE — PROGRESS NOTES
Patient: Jose Monahan   Age: 46 y.o. Patient Care Team:  Lani Campos MD as PCP - General (Family Practice)  Callie Cash MD (Cardiothoracic Surgery)  Elvin Wilson MD (Cardiology)    Diagnosis: There were no encounter diagnoses. Problem List:   Patient Active Problem List   Diagnosis Code    NSTEMI (non-ST elevation myocardial infarction) ([facility] [address] I21.4    Mixed hyperlipidemia E78.2    Tobacco use disorder F17.200    CAD (coronary artery disease), native coronary artery I25.10    CAD (coronary artery disease) I25.10    S/P CABG x 2 Z95.1        Date of Surgery: 11-20-18     Surgery: s/p cabg    HPI: Patient is here for one week follow up. Feels fairly well. Stomach occasionally feels upset/grumbly. Wife thinks it is statin. Denies chest pain, shortness of breath, and swelling in extremities. Current Medications:   Current Outpatient Medications   Medication Sig Dispense Refill    ondansetron (ZOFRAN ODT) 4 mg disintegrating tablet Take 1 Tab by mouth every eight (8) hours as needed for Nausea for up to 360 days. 30 Tab 0    amiodarone (PACERONE) 400 mg tablet 400 mg BID  X 7 days then 400 mg dailly for 23 days 37 Tab 0    aspirin delayed-release 81 mg tablet Take 1 Tab by mouth daily. 30 Tab 11    atorvastatin (LIPITOR) 40 mg tablet Take 1 Tab by mouth daily for 60 days. 30 Tab 1    metoprolol tartrate (LOPRESSOR) 25 mg tablet Take 1 Tab by mouth every twelve (12) hours for 60 days. 60 Tab 1    oxyCODONE IR (ROXICODONE) 5 mg immediate release tablet Take 1 Tab by mouth every six (6) hours as needed. Max Daily Amount: 20 mg. 40 Tab 0       Vitals: Blood pressure 109/71, pulse 91, SpO2 99 %. Allergies: has No Known Allergies. Physical Exam:  Wounds: clean, dry, no drainage    Lungs: clear to auscultation bilaterally    Heart: regular rate and rhythm, S1, S2 normal, no murmur, click, rub or gallop    Extremities: no edema    Assessment/Plan:   1.  S/P CABG  -Maintain sternal precautions   -Continue to increase ambulation  -F/u in 3 weeks  2.  Nausea/upset stomach  -will cut atorvastatin to 20mg daily  -Likely amiodarone, but will need this for one month  -Zofran PRN    Rehab - y  Walking: y

## 2018-12-05 ENCOUNTER — HOME CARE VISIT (OUTPATIENT)
Dept: HOME HEALTH SERVICES | Facility: HOME HEALTH | Age: 53
End: 2018-12-05
Payer: COMMERCIAL

## 2018-12-05 ENCOUNTER — HOME CARE VISIT (OUTPATIENT)
Dept: SCHEDULING | Facility: HOME HEALTH | Age: 53
End: 2018-12-05
Payer: COMMERCIAL

## 2018-12-05 VITALS
DIASTOLIC BLOOD PRESSURE: 70 MMHG | SYSTOLIC BLOOD PRESSURE: 110 MMHG | WEIGHT: 177 LBS | HEART RATE: 74 BPM | BODY MASS INDEX: 24.01 KG/M2 | TEMPERATURE: 98 F | OXYGEN SATURATION: 98 % | RESPIRATION RATE: 18 BRPM

## 2018-12-05 PROCEDURE — G0299 HHS/HOSPICE OF RN EA 15 MIN: HCPCS

## 2018-12-07 ENCOUNTER — HOME CARE VISIT (OUTPATIENT)
Dept: SCHEDULING | Facility: HOME HEALTH | Age: 53
End: 2018-12-07
Payer: COMMERCIAL

## 2018-12-07 VITALS
DIASTOLIC BLOOD PRESSURE: 70 MMHG | TEMPERATURE: 98.1 F | RESPIRATION RATE: 18 BRPM | WEIGHT: 176 LBS | HEART RATE: 70 BPM | OXYGEN SATURATION: 98 % | SYSTOLIC BLOOD PRESSURE: 110 MMHG | BODY MASS INDEX: 23.87 KG/M2

## 2018-12-07 PROCEDURE — G0299 HHS/HOSPICE OF RN EA 15 MIN: HCPCS

## 2018-12-12 ENCOUNTER — OFFICE VISIT (OUTPATIENT)
Dept: CARDIOLOGY CLINIC | Age: 53
End: 2018-12-12

## 2018-12-12 ENCOUNTER — CLINICAL SUPPORT (OUTPATIENT)
Dept: CARDIOLOGY CLINIC | Age: 53
End: 2018-12-12

## 2018-12-12 VITALS
HEIGHT: 72 IN | DIASTOLIC BLOOD PRESSURE: 80 MMHG | HEART RATE: 76 BPM | SYSTOLIC BLOOD PRESSURE: 120 MMHG | RESPIRATION RATE: 16 BRPM | WEIGHT: 188 LBS | OXYGEN SATURATION: 97 % | BODY MASS INDEX: 25.47 KG/M2

## 2018-12-12 DIAGNOSIS — E78.2 MIXED HYPERLIPIDEMIA: ICD-10-CM

## 2018-12-12 DIAGNOSIS — Z72.0 TOBACCO ABUSE: ICD-10-CM

## 2018-12-12 DIAGNOSIS — I21.4 NSTEMI (NON-ST ELEVATION MYOCARDIAL INFARCTION) (HCC): ICD-10-CM

## 2018-12-12 DIAGNOSIS — Z95.1 S/P CABG X 2: ICD-10-CM

## 2018-12-12 DIAGNOSIS — I25.10 ASHD (ARTERIOSCLEROTIC HEART DISEASE): ICD-10-CM

## 2018-12-12 DIAGNOSIS — I25.10 ASHD (ARTERIOSCLEROTIC HEART DISEASE): Primary | ICD-10-CM

## 2018-12-12 RX ORDER — ATORVASTATIN CALCIUM 20 MG/1
20 TABLET, FILM COATED ORAL DAILY
Qty: 90 TAB | Refills: 4 | Status: SHIPPED | OUTPATIENT
Start: 2018-12-12 | End: 2018-12-19 | Stop reason: SDUPTHER

## 2018-12-12 RX ORDER — ATORVASTATIN CALCIUM 10 MG/1
10 TABLET, FILM COATED ORAL DAILY
Qty: 90 TAB | Refills: 1 | Status: SHIPPED | OUTPATIENT
Start: 2018-12-12 | End: 2018-12-12

## 2018-12-12 NOTE — PROGRESS NOTES
1. Have you been to the ER, urgent care clinic since your last visit? Hospitalized since your last visit? Yes When: 11/2018 [facility] by-pass    2. Have you seen or consulted any other health care providers outside of the [address] [place] [address] since your last visit? Include any pap smears or colon screening. No     Patient C/O chest discomfort from surgery and feels heart beating forcefully.

## 2018-12-12 NOTE — PROGRESS NOTES
[address], [address] [place] [address]  [phone]     Subjective:      Romina Marcial is a 46 y.o. male is here post hospital f/u where he was admitted in November 2018 for NSTEMI, underwent cardiac cath and found to have 99% blockage L Main, s/p coronary revascularization: CABG x 2. Currently, he reports sternotomy / \"none\" pain and L anterior chest / L pectoral muscle pain and numbness. Also reports feeling of heart pumping harder. Denies shortness of breath, orthopnea, PND, LE edema, palpitations, syncope, or presyncope. Patient Active Problem List    Diagnosis Date Noted    ASHD (arteriosclerotic heart disease) 12/12/2018    Tobacco abuse 12/12/2018    S/P CABG x 2 11/21/2018    CAD (coronary artery disease) 11/20/2018    CAD (coronary artery disease), native coronary artery 11/19/2018    NSTEMI (non-ST elevation myocardial infarction) ([address] 11/18/2018    Mixed hyperlipidemia 11/18/2018    Tobacco use disorder 11/18/2018      Apryl Maynard MD  History reviewed. No pertinent past medical history. Past Surgical History:   Procedure Laterality Date    HX HEENT      ear tubes as a child     No Known Allergies   History reviewed. No pertinent family history.    Social History     Socioeconomic History    Marital status:      Spouse name: Not on file    Number of children: Not on file    Years of education: Not on file    Highest education level: Not on file   Social Needs    Financial resource strain: Not on file    Food insecurity - worry: Not on file    Food insecurity - inability: Not on file    Transportation needs - medical: Not on file   [employer] needs - non-medical: Not on file   Occupational History    Not on file   Tobacco Use    Smoking status: Current Every Day Smoker    Smokeless tobacco: Never Used   Substance and Sexual Activity    Alcohol use: Yes     Comment: a few beers a month    Drug use: No    Sexual activity: Not on file   Other Topics Concern    Not on file   Social History Narrative    Not on file      Current Outpatient Medications   Medication Sig    atorvastatin (LIPITOR) 20 mg tablet Take 1 Tab by mouth daily. Advised to continue 20 mg daily as this is a recently decreased dose    amiodarone (PACERONE) 400 mg tablet 400 mg BID  X 7 days then 400 mg dailly for 23 days    aspirin delayed-release 81 mg tablet Take 1 Tab by mouth daily.  metoprolol tartrate (LOPRESSOR) 25 mg tablet Take 1 Tab by mouth every twelve (12) hours for 60 days. No current facility-administered medications for this visit. Review of Symptoms:  11 systems reviewed, negative other than as stated in the HPI    Physical ExamPhysical Exam:    Vitals:    12/12/18 1543   BP: 120/80   Pulse: 76   Resp: 16   SpO2: 97%   Weight: 188 lb (85.3 kg)   Height: 6' (1.829 m)     Body mass index is 25.5 kg/m². General PE   Gen:  NAD  Mental Status - Alert. General Appearance - Not in acute distress. Chest and Lung Exam   Inspection: Accessory muscles - No use of accessory muscles in breathing. Auscultation:   Breath sounds: - Normal.   Cardiovascular   Inspection: Jugular vein - Bilateral - Inspection Normal.   Palpation/Percussion:   Apical Impulse: - Normal.   Auscultation: Rhythm - Regular. Heart Sounds - S1 WNL and S2 WNL. No S3 or S4. Murmurs & Other Heart Sounds: Auscultation of the heart reveals - No Murmurs. Peripheral Vascular   Upper Extremity: Inspection - Bilateral - No Cyanotic nailbeds or Digital clubbing. Lower Extremity:   Palpation: Edema - Bilateral - No edema. Abdomen:   Soft, non-tender, bowel sounds are active.   Neuro: A&O times 3, CN and motor grossly WNL    Labs:   Lab Results   Component Value Date/Time    Cholesterol, total 64 11/22/2018 05:46 AM    Cholesterol, total 162 11/19/2018 03:36 AM    HDL Cholesterol 24 11/22/2018 05:46 AM    HDL Cholesterol 31 11/19/2018 03:36 AM    LDL, calculated 23.8 11/22/2018 05:46 AM    LDL, calculated 103.8 (H) 11/19/2018 03:36 AM    Triglyceride 81 11/22/2018 05:46 AM    Triglyceride 136 11/19/2018 03:36 AM    CHOL/HDL Ratio 2.7 11/22/2018 05:46 AM    CHOL/HDL Ratio 5.2 (H) 11/19/2018 03:36 AM     No results found for: CPK, CPKX, CPX  Lab Results   Component Value Date/Time    Sodium 141 11/24/2018 06:34 AM    Potassium 3.4 (L) 11/24/2018 06:34 AM    Chloride 105 11/24/2018 06:34 AM    CO2 31 11/24/2018 06:34 AM    Anion gap 5 11/24/2018 06:34 AM    Glucose 110 (H) 11/24/2018 06:34 AM    BUN 14 11/24/2018 06:34 AM    Creatinine 0.90 11/24/2018 06:34 AM    BUN/Creatinine ratio 16 11/24/2018 06:34 AM    GFR est AA >60 11/24/2018 06:34 AM    GFR est non-AA >60 11/24/2018 06:34 AM    Calcium 8.0 (L) 11/24/2018 06:34 AM    Bilirubin, total 0.9 11/21/2018 04:20 AM    AST (SGOT) 24 11/21/2018 04:20 AM    Alk. phosphatase 47 11/21/2018 04:20 AM    Protein, total 5.9 (L) 11/21/2018 04:20 AM    Albumin 3.7 11/21/2018 04:20 AM    Globulin 2.2 11/21/2018 04:20 AM    A-G Ratio 1.7 11/21/2018 04:20 AM    ALT (SGPT) 20 11/21/2018 04:20 AM       EKG:  NSR      Assessment:     Assessment:      1. ASHD (arteriosclerotic heart disease)    2. S/P CABG x 2    3. Mixed hyperlipidemia    4. Tobacco abuse        Orders Placed This Encounter    LIPID PANEL     Standing Status:   Future     Standing Expiration Date:   0/08/8855    METABOLIC PANEL, COMPREHENSIVE     Standing Status:   Future     Standing Expiration Date:   4/12/2019    CK     Standing Status:   Future     Standing Expiration Date:   4/12/2019    AMB POC EKG ROUTINE W/ 12 LEADS, INTER & REP     Order Specific Question:   Reason for Exam:     Answer:   routine    HOLTER MONITOR, 24 HOURS, Clinic Performed     Standing Status:   Future     Standing Expiration Date:   6/12/2019     Order Specific Question:   Reason for Exam:     Answer:   r/o PAF    DISCONTD: atorvastatin (LIPITOR) 10 mg tablet     Sig: Take 1 Tab by mouth daily. Dispense:  90 Tab     Refill:  1    atorvastatin (LIPITOR) 20 mg tablet     Sig: Take 1 Tab by mouth daily. Advised to continue 20 mg daily as this is a recently decreased dose     Dispense:  90 Tab     Refill:  4        Plan:     Pt is here post hospital f/u where he was admitted in November 2018 for NSTEMI, underwent cardiac cath and found to have 99% blockage L Main, s/p coronary revascularization: CABG x 2. Currently, he reports sternotomy / \"none\" pain and L anterior chest / L pectoral muscle pain and numbness. Also reports feeling of heart pumping harder. ASHD, CABG x 2 11/18  Cardiac cath 11/18 showed 99% distal LM CAD:  Continue ASA, BB. Eventual goal of dc BB. Initial cardiac rehab appointment is on Tuesday, December 18th. BP controlled. Normal EF with no valvular pathology per echo in 11/18. Brief PAF post op  He is on Amiodarone 400 mg daily, wants it dc or tapered off. Will obtain 24 hr Holter and if no recurrent AF, will dc Amiodarone    11/18 LDL at 23. He is taking 20 mg of Lipitor, recently decreased because of his low LDL. I advised that regardless of cholesterol numbers, he needs a lifelong statin as per AHA guidelines because he had a 99% left main stenosis. Repeat labs in 3 months    Quit smoking. Congratulated. He is seeking a new PCP. Continue current care and f/u in 6 months, sooner PRN.     Karl Davila MD

## 2018-12-18 ENCOUNTER — HOSPITAL ENCOUNTER (OUTPATIENT)
Dept: CARDIAC REHAB | Age: 53
Discharge: HOME | End: 2018-12-18
Payer: COMMERCIAL

## 2018-12-18 VITALS — WEIGHT: 190.4 LBS | BODY MASS INDEX: 25.79 KG/M2 | HEIGHT: 72 IN

## 2018-12-18 PROCEDURE — 93798 PHYS/QHP OP CAR RHAB W/ECG: CPT

## 2018-12-18 NOTE — CARDIO/PULMONARY
Cardiopulmonary Rehab Intake Note:  Met with Mr. Michelle Sandoval for the initial session. Mr. Stefan Dao is a 46year old patient of Dr. Samina Chavarria and Dr. Nba Irving who presents to rehab for cardiac strengthening and conditioning, S/P CABG X 2 ON 11/20/18 after NSTEMI on 11/18/18. LVEF 40%. PMH significant only for tobacco abuse.        Lungs were clear to auscultation except diminished at the bases. He has occasional cough. No BLE edema. Mid sternal well healing scar.      Limitations to exercise are primarily related to recent surgery, deconditioning, EF 40% and smoking history - recently quit on 11/18/18.         He has been walking for exercise at home, walking up to a mile a day.         Pt completed the 6 min walk on the treadmill without stopping, walking a total of 420 meters, RPD 11, mets 3.      Psychosocial: Pt scored 9 on PHQ9. PCP notified. He denies any depression. 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 He is  and has two sons, one living at home. Wife is in a wheelchair. Wife and sons are very supportive. He also states he has a supportive Shinto family also. He enjoys fishing and some golfing. He recently quit smoking on 11/18/18 the day he had a heart attack and then subsequently underwent bypass surgery on 11/20/18.        Patient was given an overview of cardiac rehab program, placement of electrode/leads, and rationale for program. Patient viewed the cardiac rehab DVD and an education notebook was given.       Heart rhythm on the monitor was a normal sinus rhythm  Oxygen saturation was 96% on room air. BMI 25.8. Patient's identified goals are  1. To walk for exercise 30 min twice a day. 2.  To maintain his smoking cessation! 3. To increase fruits and vegetables in his diet and watch sodium intake.

## 2018-12-19 ENCOUNTER — APPOINTMENT (OUTPATIENT)
Dept: CARDIAC REHAB | Age: 53
End: 2018-12-19
Attending: THORACIC SURGERY (CARDIOTHORACIC VASCULAR SURGERY)
Payer: COMMERCIAL

## 2018-12-19 ENCOUNTER — OFFICE VISIT (OUTPATIENT)
Dept: CARDIOTHORACIC SURGERY | Age: 53
End: 2018-12-19

## 2018-12-19 VITALS
WEIGHT: 190 LBS | HEART RATE: 94 BPM | BODY MASS INDEX: 25.73 KG/M2 | SYSTOLIC BLOOD PRESSURE: 100 MMHG | HEIGHT: 72 IN | DIASTOLIC BLOOD PRESSURE: 62 MMHG | OXYGEN SATURATION: 97 %

## 2018-12-19 DIAGNOSIS — Z95.1 S/P CABG X 2: Primary | ICD-10-CM

## 2018-12-19 RX ORDER — METOPROLOL TARTRATE 25 MG/1
25 TABLET, FILM COATED ORAL EVERY 12 HOURS
Qty: 30 TAB | Refills: 2 | Status: SHIPPED | OUTPATIENT
Start: 2018-12-19 | End: 2019-01-14 | Stop reason: SDUPTHER

## 2018-12-19 RX ORDER — ATORVASTATIN CALCIUM 10 MG/1
10 TABLET, FILM COATED ORAL DAILY
Qty: 30 TAB | Refills: 2 | Status: SHIPPED | OUTPATIENT
Start: 2018-12-19 | End: 2020-01-29

## 2018-12-19 NOTE — PROGRESS NOTES
Patient: Dustin Chacon   Age: 46 y.o. Cardiologist[de-identified] [address]     Primary Care Provider: Clementina Hernandez MD    Problem List:   Patient Active Problem List   Diagnosis Code    [facility]non-ST elevation [facility] I21.4    Mixed hyperlipidemia E78.2    Tobacco use disorder F17.200    CAD (coronary artery disease), native coronary artery I25.10    CAD (coronary artery disease) I25.10    S/P CABG x 2 Z95.1    ASHD (arteriosclerotic heart disease) I25.10    Tobacco abuse Z72.0       Surgery:   Past Surgical History:   Procedure Laterality Date    HX HEENT      ear tubes as a child       Current Medications:   Current Outpatient Medications   Medication Sig Dispense Refill    atorvastatin (LIPITOR) 10 mg tablet Take 1 Tab by mouth daily. Advised to continue 20 mg daily as this is a recently decreased dose 30 Tab 2    metoprolol tartrate (LOPRESSOR) 25 mg tablet Take 1 Tab by mouth every twelve (12) hours. 30 Tab 2    aspirin delayed-release 81 mg tablet Take 1 Tab by mouth daily. 30 Tab 11       HPI:chest soreness    Vitals: Blood pressure 100/62, pulse 94, height 5' 11.65\" (1.82 m), weight 190 lb (86.2 kg), SpO2 97 %. Allergies: has No Known Allergies. Wounds: Healing    Sternum: stable    Lungs: clear    Heart: regular rate and rhythm: No murmur    Abdomen: soft    Extremities: No edema    Rehab : y    Walking: y    Glucometer:     Assessment/Plan: 1. May drive  2. Lift 10-15 lbs for 30 days  3.  Lifting 50 lbs at work after 90 days

## 2018-12-20 ENCOUNTER — HOSPITAL ENCOUNTER (OUTPATIENT)
Dept: CARDIAC REHAB | Age: 53
Discharge: HOME | End: 2018-12-20
Attending: THORACIC SURGERY (CARDIOTHORACIC VASCULAR SURGERY)
Payer: COMMERCIAL

## 2018-12-20 VITALS — WEIGHT: 192.2 LBS | BODY MASS INDEX: 26.32 KG/M2

## 2018-12-20 DIAGNOSIS — Z95.1 S/P CABG (CORONARY ARTERY BYPASS GRAFT): ICD-10-CM

## 2018-12-20 PROCEDURE — 93798 PHYS/QHP OP CAR RHAB W/ECG: CPT

## 2018-12-20 NOTE — PROGRESS NOTES
Holter monitor shows no more evidence of atrial fibrillation. He can stop amiodarone now. Please schedule an EKG in our office in 1 month to verify still in normal rhythm.

## 2018-12-21 ENCOUNTER — TELEPHONE (OUTPATIENT)
Dept: CARDIOLOGY CLINIC | Age: 53
End: 2018-12-21

## 2018-12-21 NOTE — TELEPHONE ENCOUNTER
Verified patient with two identifiers. Pt informed of monitor results. Also informed he may stop the amiodarone. Appointment made for EKG in a month. Pt verbalized understanding.

## 2018-12-21 NOTE — TELEPHONE ENCOUNTER
----- Message from Nevin Garcia MD sent at 12/20/2018  2:43 PM EST -----  Holter monitor shows no more evidence of atrial fibrillation. He can stop amiodarone now. Please schedule an EKG in our office in 1 month to verify still in normal rhythm.

## 2018-12-26 ENCOUNTER — HOSPITAL ENCOUNTER (OUTPATIENT)
Dept: CARDIAC REHAB | Age: 53
Discharge: HOME | End: 2018-12-26
Attending: THORACIC SURGERY (CARDIOTHORACIC VASCULAR SURGERY)
Payer: COMMERCIAL

## 2018-12-26 VITALS — WEIGHT: 193.1 LBS | BODY MASS INDEX: 26.44 KG/M2

## 2018-12-26 PROCEDURE — 93798 PHYS/QHP OP CAR RHAB W/ECG: CPT

## 2018-12-26 NOTE — CARDIO/PULMONARY
Cardiopulmonary Rehab Intake Note:  Met with Mr. Areli Aponte for the initial session. Mr. Jamal Urbano is a 46year old patient of Dr. Irving Day and Dr. Sherrill Araiza who presents to rehab for cardiac strengthening and conditioning, S/P CABG X 2 ON 11/20/18 after NSTEMI on 11/18/18. LVEF 40%. PMH significant only for tobacco abuse.        Lungs were clear to auscultation except diminished at the bases. He has occasional cough. No BLE edema. Mid sternal well healing scar.      Limitations to exercise are primarily related to recent surgery, deconditioning, EF 40% and smoking history - recently quit on 11/18/18.         He has been walking for exercise at home, walking up to a mile a day.         Pt completed the 6 min walk on the treadmill without stopping, walking a total of 420 meters, RPD 11, mets 3.      Psychosocial: Pt scored 9 on PHQ9.  PCP notified. He denies any depression. 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 Wife is in a wheelchair. Wife and sons are very supportive. He also states he has a supportive Islam family also. He enjoys fishing and some golfing. He recently quit smoking on 11/18/18 the day he had a heart attack and then subsequently underwent bypass surgery on 11/20/18.        Patient was given an overview of cardiac rehab program, placement of electrode/leads, and rationale for program. Patient viewed the cardiac rehab DVD and an education notebook was given.       Heart rhythm on the monitor was a normal sinus rhythm  Oxygen saturation was 96% on room air. BMI 25.8. Patient's identified goals are  1. To begin home exercise beginning with walking 10-15 min 3 x week. 2.  To increased fruits and vegetables in her diet and decrease portion sizes.   3.  Better BP control with meds, low salt diet, stress reduction and weight loss.

## 2018-12-31 ENCOUNTER — APPOINTMENT (OUTPATIENT)
Dept: CARDIAC REHAB | Age: 53
End: 2018-12-31
Attending: THORACIC SURGERY (CARDIOTHORACIC VASCULAR SURGERY)
Payer: COMMERCIAL

## 2019-01-02 ENCOUNTER — APPOINTMENT (OUTPATIENT)
Dept: CARDIAC REHAB | Age: 54
End: 2019-01-02
Attending: THORACIC SURGERY (CARDIOTHORACIC VASCULAR SURGERY)

## 2019-01-07 ENCOUNTER — APPOINTMENT (OUTPATIENT)
Dept: CARDIAC REHAB | Age: 54
End: 2019-01-07
Attending: THORACIC SURGERY (CARDIOTHORACIC VASCULAR SURGERY)

## 2019-01-09 ENCOUNTER — APPOINTMENT (OUTPATIENT)
Dept: CARDIAC REHAB | Age: 54
End: 2019-01-09
Attending: THORACIC SURGERY (CARDIOTHORACIC VASCULAR SURGERY)

## 2019-01-14 ENCOUNTER — APPOINTMENT (OUTPATIENT)
Dept: CARDIAC REHAB | Age: 54
End: 2019-01-14
Attending: THORACIC SURGERY (CARDIOTHORACIC VASCULAR SURGERY)

## 2019-01-16 ENCOUNTER — APPOINTMENT (OUTPATIENT)
Dept: CARDIAC REHAB | Age: 54
End: 2019-01-16
Attending: THORACIC SURGERY (CARDIOTHORACIC VASCULAR SURGERY)

## 2019-01-21 ENCOUNTER — APPOINTMENT (OUTPATIENT)
Dept: CARDIAC REHAB | Age: 54
End: 2019-01-21
Attending: THORACIC SURGERY (CARDIOTHORACIC VASCULAR SURGERY)

## 2019-01-21 RX ORDER — METOPROLOL TARTRATE 25 MG/1
25 TABLET, FILM COATED ORAL EVERY 12 HOURS
Qty: 180 TAB | Refills: 3 | Status: SHIPPED | OUTPATIENT
Start: 2019-01-21 | End: 2020-01-29 | Stop reason: ALTCHOICE

## 2019-01-23 ENCOUNTER — APPOINTMENT (OUTPATIENT)
Dept: CARDIAC REHAB | Age: 54
End: 2019-01-23
Attending: THORACIC SURGERY (CARDIOTHORACIC VASCULAR SURGERY)

## 2019-01-28 ENCOUNTER — APPOINTMENT (OUTPATIENT)
Dept: CARDIAC REHAB | Age: 54
End: 2019-01-28
Attending: THORACIC SURGERY (CARDIOTHORACIC VASCULAR SURGERY)

## 2019-01-30 ENCOUNTER — APPOINTMENT (OUTPATIENT)
Dept: CARDIAC REHAB | Age: 54
End: 2019-01-30
Attending: THORACIC SURGERY (CARDIOTHORACIC VASCULAR SURGERY)

## 2019-02-04 ENCOUNTER — APPOINTMENT (OUTPATIENT)
Dept: CARDIAC REHAB | Age: 54
End: 2019-02-04
Attending: THORACIC SURGERY (CARDIOTHORACIC VASCULAR SURGERY)

## 2019-02-06 ENCOUNTER — APPOINTMENT (OUTPATIENT)
Dept: CARDIAC REHAB | Age: 54
End: 2019-02-06
Attending: THORACIC SURGERY (CARDIOTHORACIC VASCULAR SURGERY)

## 2019-02-11 ENCOUNTER — APPOINTMENT (OUTPATIENT)
Dept: CARDIAC REHAB | Age: 54
End: 2019-02-11
Attending: THORACIC SURGERY (CARDIOTHORACIC VASCULAR SURGERY)

## 2019-03-18 DIAGNOSIS — Z95.1 S/P CABG X 2: ICD-10-CM

## 2019-03-18 DIAGNOSIS — I25.10 ASHD (ARTERIOSCLEROTIC HEART DISEASE): ICD-10-CM

## 2019-03-18 DIAGNOSIS — Z72.0 TOBACCO ABUSE: ICD-10-CM

## 2019-03-18 DIAGNOSIS — E78.2 MIXED HYPERLIPIDEMIA: ICD-10-CM

## 2019-06-11 ENCOUNTER — TELEPHONE (OUTPATIENT)
Dept: CARDIOLOGY CLINIC | Age: 54
End: 2019-06-11

## 2019-06-11 DIAGNOSIS — Z95.1 S/P CABG X 2: ICD-10-CM

## 2019-06-11 DIAGNOSIS — E78.2 MIXED HYPERLIPIDEMIA: ICD-10-CM

## 2019-06-11 DIAGNOSIS — I25.10 ASHD (ARTERIOSCLEROTIC HEART DISEASE): Primary | ICD-10-CM

## 2019-06-11 NOTE — TELEPHONE ENCOUNTER
----- Message from Jaqueline Price sent at 6/11/2019  3:47 PM EDT -----  Regarding: patient coming in July 24  Patient asking if he needs labs to be done prior to his visit in July?

## 2019-07-10 LAB
ALBUMIN SERPL-MCNC: 4.8 G/DL (ref 3.5–5.5)
ALBUMIN/GLOB SERPL: 1.9 {RATIO} (ref 1.2–2.2)
ALP SERPL-CCNC: 87 IU/L (ref 39–117)
ALT SERPL-CCNC: 19 IU/L (ref 0–44)
AST SERPL-CCNC: 17 IU/L (ref 0–40)
BILIRUB SERPL-MCNC: 0.4 MG/DL (ref 0–1.2)
BUN SERPL-MCNC: 17 MG/DL (ref 6–24)
BUN/CREAT SERPL: 17 (ref 9–20)
CALCIUM SERPL-MCNC: 9.8 MG/DL (ref 8.7–10.2)
CHLORIDE SERPL-SCNC: 101 MMOL/L (ref 96–106)
CHOLEST SERPL-MCNC: 228 MG/DL (ref 100–199)
CK SERPL-CCNC: 107 U/L (ref 24–204)
CO2 SERPL-SCNC: 25 MMOL/L (ref 20–29)
CREAT SERPL-MCNC: 1.02 MG/DL (ref 0.76–1.27)
GLOBULIN SER CALC-MCNC: 2.5 G/DL (ref 1.5–4.5)
GLUCOSE SERPL-MCNC: 115 MG/DL (ref 65–99)
HDLC SERPL-MCNC: 38 MG/DL
INTERPRETATION, 910389: NORMAL
LDLC SERPL CALC-MCNC: 140 MG/DL (ref 0–99)
POTASSIUM SERPL-SCNC: 4.8 MMOL/L (ref 3.5–5.2)
PROT SERPL-MCNC: 7.3 G/DL (ref 6–8.5)
SODIUM SERPL-SCNC: 143 MMOL/L (ref 134–144)
TRIGL SERPL-MCNC: 248 MG/DL (ref 0–149)
VLDLC SERPL CALC-MCNC: 50 MG/DL (ref 5–40)

## 2019-07-16 NOTE — PROGRESS NOTES
Katharine,    Please call patient and inform lipids are not at all at goal.  Last check around 12/2018, LDL was 23 and TC was 64. I see we had decreased lipitor to 20 mg daily. Is he still taking this dose? If so, this is a very significant increase in lipids -- we will have to go back to a higher dose of meds. Resume lipitor 40 mg daily. Goal LDL < 70, his LDL is 140!! Total cholesterol 228, goal < 200! If he has NOT been taking statins regularly, then please make sure he resumes cholesterol therapy. Discussed at last visit the importance of statin therapy regardless of how well lipids are controlled given his coronary artery disease. If it is an issue of side effects, we can try alternates. If he wants to discuss further, please have him make an appt with me.     Thanks,  [employer]
Left message on Identified VM to call office a [phone]
no

## 2019-07-17 ENCOUNTER — TELEPHONE (OUTPATIENT)
Dept: CARDIOLOGY CLINIC | Age: 54
End: 2019-07-17

## 2019-07-17 NOTE — TELEPHONE ENCOUNTER
----- Message from Julienne Lopez NP sent at 7/11/2019 12:25 PM EDT -----  Shaq Singh,    Please call patient and inform lipids are not at all at goal.  Last check around 12/2018, LDL was 23 and TC was 64. I see we had decreased lipitor to 20 mg daily. Is he still taking this dose? If so, this is a very significant increase in lipids -- we will have to go back to a higher dose of meds. Resume lipitor 40 mg daily. Goal LDL < 70, his LDL is 140!! Total cholesterol 228, goal < 200! If he has NOT been taking statins regularly, then please make sure he resumes cholesterol therapy. Discussed at last visit the importance of statin therapy regardless of how well lipids are controlled given his coronary artery disease. If it is an issue of side effects, we can try alternates. If he wants to discuss further, please have him make an appt with me.     Thanks,  [employer]

## 2019-07-17 NOTE — TELEPHONE ENCOUNTER
Patient has stopped smoking eating whatever he wants not taking statins will discuss in follow up  7/24/19

## 2019-07-24 ENCOUNTER — OFFICE VISIT (OUTPATIENT)
Dept: CARDIOLOGY CLINIC | Age: 54
End: 2019-07-24

## 2019-07-24 VITALS
SYSTOLIC BLOOD PRESSURE: 118 MMHG | HEIGHT: 71 IN | HEART RATE: 78 BPM | RESPIRATION RATE: 16 BRPM | WEIGHT: 210 LBS | OXYGEN SATURATION: 95 % | BODY MASS INDEX: 29.4 KG/M2 | DIASTOLIC BLOOD PRESSURE: 84 MMHG

## 2019-07-24 DIAGNOSIS — E78.2 MIXED HYPERLIPIDEMIA: ICD-10-CM

## 2019-07-24 DIAGNOSIS — I25.10 CORONARY ARTERY DISEASE INVOLVING NATIVE HEART WITHOUT ANGINA PECTORIS, UNSPECIFIED VESSEL OR LESION TYPE: ICD-10-CM

## 2019-07-24 DIAGNOSIS — I25.10 ASHD (ARTERIOSCLEROTIC HEART DISEASE): Primary | ICD-10-CM

## 2019-07-24 DIAGNOSIS — Z95.1 S/P CABG X 2: ICD-10-CM

## 2019-07-24 RX ORDER — EZETIMIBE 10 MG/1
10 TABLET ORAL DAILY
Qty: 90 TAB | Refills: 1 | Status: SHIPPED | OUTPATIENT
Start: 2019-07-24 | End: 2020-02-03

## 2019-07-24 RX ORDER — ROSUVASTATIN CALCIUM 5 MG/1
5 TABLET, COATED ORAL
Qty: 90 TAB | Refills: 1 | Status: SHIPPED | OUTPATIENT
Start: 2019-07-24 | End: 2020-01-29

## 2019-07-24 NOTE — PROGRESS NOTES
1. Have you been to the ER, urgent care clinic since your last visit? Hospitalized since your last visit? No    2. Have you seen or consulted any other health care providers outside of the [address] [place] [address] since your last visit? Include any pap smears or colon screening.  No    Chief Complaint   Patient presents with    Cholesterol Problem     6 mo f/u    Coronary Artery Disease     6 mo f/u

## 2019-07-24 NOTE — LETTER
7/24/19 Patient: Sally Chadwick YOB: 1965 Date of Visit: 7/24/2019 Felix Jane MD 
[address] [place] [address] [address] [id number] VIA Facsimile: [phone] Dear Felix Jane MD, Thank you for referring Mr. Ortega Stack to [facility] for evaluation. My notes for this consultation are attached. If you have questions, please do not hesitate to call me. I look forward to following your patient along with you.  
 
 
Sincerely, 
 
Wendy Rivera MD

## 2019-07-24 NOTE — PROGRESS NOTES
Iva Garcia DNP, ANP-BC  Subjective/HPI:     Brigid Link is a 46 y.o. male is here for routine follow-up. History of CABG x2 LIMA to LAD, SVG to OM1. He has quit smoking. Denies chest pain shortness of breath lightheadedness or dizziness. States he is intolerant to Lipitor due to significant lower extremity aching. He prefers not to remain/go on statin therapy. PCP Provider  Maria Luz Roblero MD  History reviewed. No pertinent past medical history. Past Surgical History:   Procedure Laterality Date    HX HEENT      ear tubes as a child     No Known Allergies   History reviewed. No pertinent family history. Current Outpatient Medications   Medication Sig    ezetimibe (ZETIA) 10 mg tablet Take 1 Tab by mouth daily.  rosuvastatin (CRESTOR) 5 mg tablet Take 1 Tab by mouth nightly.  metoprolol tartrate (LOPRESSOR) 25 mg tablet Take 1 Tab by mouth every twelve (12) hours.  aspirin delayed-release 81 mg tablet Take 1 Tab by mouth daily.  atorvastatin (LIPITOR) 10 mg tablet Take 1 Tab by mouth daily. Advised to continue 20 mg daily as this is a recently decreased dose     No current facility-administered medications for this visit.        Vitals:    19 1520 19 1530   BP: 120/80 118/84   Pulse: 78    Resp: 16    SpO2: 95%    Weight: 210 lb (95.3 kg)    Height: 5' 11\" (1.803 m)      Social History     Socioeconomic History    Marital status:      Spouse name: Not on file    Number of children: Not on file    Years of education: Not on file    Highest education level: Not on file   Occupational History    Not on file   Social Needs    Financial resource strain: Not on file    Food insecurity:     Worry: Not on file     Inability: Not on file    Transportation needs:     Medical: Not on file     Non-medical: Not on file   Tobacco Use    Smoking status: Former Smoker     Last attempt to quit: 2018     Years since quittin.6    Smokeless tobacco: Never Used   Substance and Sexual Activity    Alcohol use: Yes     Comment: a few beers a month    Drug use: No    Sexual activity: Not on file   Lifestyle    Physical activity:     Days per week: Not on file     Minutes per session: Not on file    Stress: Not on file   Relationships    Social connections:     Talks on phone: Not on file     Gets together: Not on file     Attends Restoration service: Not on file     Active member of club or organization: Not on file     Attends meetings of clubs or organizations: Not on file     Relationship status: Not on file    Intimate partner violence:     Fear of current or ex partner: Not on file     Emotionally abused: Not on file     Physically abused: Not on file     Forced sexual activity: Not on file   Other Topics Concern    Not on file   Social History Narrative    Not on file       I have reviewed the nurses notes, vitals, problem list, allergy list, medical history, family, social history and medications. Review of Symptoms:    General: Pt denies excessive weight gain or loss. Pt is able to conduct ADL's  HEENT: Denies blurred vision, headaches, epistaxis and difficulty swallowing. Respiratory: Denies shortness of breath, GRAFF, wheezing or stridor. Cardiovascular: Denies precordial pain, palpitations, edema or PND  Gastrointestinal: Denies poor appetite, indigestion, abdominal pain or blood in stool  Musculoskeletal: Denies pain or swelling from muscles or joints  Neurologic: Denies tremor, paresthesias, or sensory motor disturbance  Skin: Denies rash, itching or texture change. Physical Exam:      General: Well developed, in no acute distress, cooperative and alert  HEENT: No carotid bruits, no JVD, trach is midline. Neck Supple, PEERL, EOM intact. Heart:  Normal S1/S2 negative S3 or S4.  Regular, no murmur, gallop or rub.   Respiratory: Clear bilaterally x 4, no wheezing or rales  Abdomen:   Soft, non-tender, no masses, bowel sounds are active.   Extremities:  No edema, normal cap refill, no cyanosis, atraumatic. Neuro: A&Ox3, speech clear, gait stable. Skin: Skin color is normal. No rashes or lesions. Non diaphoretic  Vascular: 2+ pulses symmetric in all extremities    Cardiographics    ECG: Sinus rhythm  Results for orders placed or performed in visit on 12/12/18   CARDIAC HOLTER MONITOR, 24 HOURS    Narrative    ECG Monitor/24 hours, Complete    Reason for Holter Monitor   PALPITATIONS    Heartbeat    Slowest 60  Average 75  Fastest  103      Results:   Underlying Rhythm: Normal sinus rhythm      Atrial Arrhythmias: premature atrial contractions; rare            AV Conduction: normal    Ventricular Arrhythmias: premature ventricular contractions; rare, with brief periods when they are frequent (0.16% of all beats)    ST Segment Analysis:non-specific changes     Symptom Correlation:  Specific symptoms not reported.        Iona Goodman MD      Results for orders placed or performed during the hospital encounter of 11/18/18   EKG, 12 LEAD, INITIAL   Result Value Ref Range    Ventricular Rate 88 BPM    Atrial Rate 88 BPM    P-R Interval 160 ms    QRS Duration 96 ms    Q-T Interval 376 ms    QTC Calculation (Bezet) 454 ms    Calculated P Axis 62 degrees    Calculated R Axis -32 degrees    Calculated T Axis 69 degrees    Diagnosis       Normal sinus rhythm with sinus arrhythmia  Left axis deviation  Nonspecific ST abnormality  No previous ECGs available  Confirmed by Ifeanyi Pak ([id number]) on 11/19/2018 9:09:53 AM           Cardiology Labs:  Lab Results   Component Value Date/Time    Cholesterol, total 228 (H) 07/09/2019 07:49 AM    HDL Cholesterol 38 (L) 07/09/2019 07:49 AM    LDL, calculated 140 (H) 07/09/2019 07:49 AM    Triglyceride 248 (H) 07/09/2019 07:49 AM    CHOL/HDL Ratio 2.7 11/22/2018 05:46 AM       Lab Results   Component Value Date/Time    Sodium 143 07/09/2019 07:49 AM    Potassium 4.8 07/09/2019 07:49 AM    Chloride 101 07/09/2019 07:49 AM    CO2 25 07/09/2019 07:49 AM    Anion gap 5 11/24/2018 06:34 AM    Glucose 115 (H) 07/09/2019 07:49 AM    BUN 17 07/09/2019 07:49 AM    Creatinine 1.02 07/09/2019 07:49 AM    BUN/Creatinine ratio 17 07/09/2019 07:49 AM    GFR est AA 96 07/09/2019 07:49 AM    GFR est non-AA 83 07/09/2019 07:49 AM    Calcium 9.8 07/09/2019 07:49 AM    Bilirubin, total 0.4 07/09/2019 07:49 AM    AST (SGOT) 17 07/09/2019 07:49 AM    Alk. phosphatase 87 07/09/2019 07:49 AM    Protein, total 7.3 07/09/2019 07:49 AM    Albumin 4.8 07/09/2019 07:49 AM    Globulin 2.2 11/21/2018 04:20 AM    A-G Ratio 1.9 07/09/2019 07:49 AM    ALT (SGPT) 19 07/09/2019 07:49 AM           Assessment:     Assessment:     Diagnoses and all orders for this visit:    1. ASHD (arteriosclerotic heart disease)  -     METABOLIC PANEL, COMPREHENSIVE; Future  -     LIPID PANEL; Future  -     CK; Future    2. Mixed hyperlipidemia  -     AMB POC EKG ROUTINE W/ 12 LEADS, INTER & REP  -     METABOLIC PANEL, COMPREHENSIVE; Future  -     LIPID PANEL; Future  -     CK; Future    3. Coronary artery disease involving native heart without angina pectoris, unspecified vessel or lesion type  -     METABOLIC PANEL, COMPREHENSIVE; Future  -     LIPID PANEL; Future  -     CK; Future    4. S/P CABG x 2  -     METABOLIC PANEL, COMPREHENSIVE; Future  -     LIPID PANEL; Future  -     CK; Future    Other orders  -     ezetimibe (ZETIA) 10 mg tablet; Take 1 Tab by mouth daily. -     rosuvastatin (CRESTOR) 5 mg tablet; Take 1 Tab by mouth nightly. ICD-10-CM ICD-9-CM    1. ASHD (arteriosclerotic heart disease) R52.60 032.24 METABOLIC PANEL, COMPREHENSIVE      LIPID PANEL      CK   2. Mixed hyperlipidemia E78.2 272.2 AMB POC EKG ROUTINE W/ 12 LEADS, INTER & REP      METABOLIC PANEL, COMPREHENSIVE      LIPID PANEL      CK   3.  Coronary artery disease involving native heart without angina pectoris, unspecified vessel or lesion type F76.06 259.94 METABOLIC PANEL, COMPREHENSIVE      LIPID PANEL      CK   4. S/P CABG x 2 M48.6 K59.74 METABOLIC PANEL, COMPREHENSIVE      LIPID PANEL      CK     Orders Placed This Encounter    METABOLIC PANEL, COMPREHENSIVE     Standing Status:   Future     Standing Expiration Date:   1/24/2020    LIPID PANEL     Standing Status:   Future     Standing Expiration Date:   1/24/2020    CK     Standing Status:   Future     Standing Expiration Date:   1/24/2020    AMB POC EKG ROUTINE W/ 12 LEADS, INTER & REP     Order Specific Question:   Reason for Exam:     Answer:   Routine    ezetimibe (ZETIA) 10 mg tablet     Sig: Take 1 Tab by mouth daily. Dispense:  90 Tab     Refill:  1    rosuvastatin (CRESTOR) 5 mg tablet     Sig: Take 1 Tab by mouth nightly. Dispense:  90 Tab     Refill:  1        Plan:     1. Atherosclerotic heart disease: History of CABG times 2/20/2018 clinically stable asymptomatic continue beta-blocker therapy  2. Hyperlipidemia: We will start Zetia 10 mg daily per patient request x1 month then add Crestor 5 mg nightly. Patient advised to call office if intolerant to Crestor. Recheck lipid panel in 90 days. If intolerant to Crestor will then reapproach PSK 9 therapy with patient  3. Counseled on diet and exercise- eventual goal of 30-60 minutes 5-7 times a week as per AHA guidelines. Follow-up in 6 months    Libby Hopkins MD    This note was created using voice recognition software. Despite editing, there may be syntax errors.

## 2019-07-30 ENCOUNTER — TELEPHONE (OUTPATIENT)
Dept: CARDIOLOGY CLINIC | Age: 54
End: 2019-07-30

## 2019-07-30 NOTE — TELEPHONE ENCOUNTER
Pt have to have prior auth for his medication ezetimibe (ZETIA) 10 mg  Tablet [id number] [place], [address] [facility] [phone]        Thanks

## 2019-07-31 NOTE — TELEPHONE ENCOUNTER
Questionnaire submitted. PA Case [id number] Status: Approved. Authorization and Notifications Completed.

## 2019-10-24 DIAGNOSIS — I25.10 ASHD (ARTERIOSCLEROTIC HEART DISEASE): ICD-10-CM

## 2019-10-24 DIAGNOSIS — I25.10 CORONARY ARTERY DISEASE INVOLVING NATIVE HEART WITHOUT ANGINA PECTORIS, UNSPECIFIED VESSEL OR LESION TYPE: ICD-10-CM

## 2019-10-24 DIAGNOSIS — E78.2 MIXED HYPERLIPIDEMIA: ICD-10-CM

## 2019-10-24 DIAGNOSIS — Z95.1 S/P CABG X 2: ICD-10-CM

## 2020-01-14 ENCOUNTER — TELEPHONE (OUTPATIENT)
Dept: CARDIOLOGY CLINIC | Age: 55
End: 2020-01-14

## 2020-01-14 LAB
ALBUMIN SERPL-MCNC: 4.7 G/DL (ref 3.5–5.5)
ALBUMIN/GLOB SERPL: 2 {RATIO} (ref 1.2–2.2)
ALP SERPL-CCNC: 71 IU/L (ref 39–117)
ALT SERPL-CCNC: 26 IU/L (ref 0–44)
AST SERPL-CCNC: 20 IU/L (ref 0–40)
BILIRUB SERPL-MCNC: 0.5 MG/DL (ref 0–1.2)
BUN SERPL-MCNC: 11 MG/DL (ref 6–24)
BUN/CREAT SERPL: 9 (ref 9–20)
CALCIUM SERPL-MCNC: 9.5 MG/DL (ref 8.7–10.2)
CHLORIDE SERPL-SCNC: 104 MMOL/L (ref 96–106)
CHOLEST SERPL-MCNC: 172 MG/DL (ref 100–199)
CK SERPL-CCNC: 107 U/L (ref 24–204)
CO2 SERPL-SCNC: 23 MMOL/L (ref 20–29)
CREAT SERPL-MCNC: 1.17 MG/DL (ref 0.76–1.27)
GLOBULIN SER CALC-MCNC: 2.4 G/DL (ref 1.5–4.5)
GLUCOSE SERPL-MCNC: 90 MG/DL (ref 65–99)
HDLC SERPL-MCNC: 38 MG/DL
INTERPRETATION, 910389: NORMAL
LDLC SERPL CALC-MCNC: 104 MG/DL (ref 0–99)
POTASSIUM SERPL-SCNC: 4 MMOL/L (ref 3.5–5.2)
PROT SERPL-MCNC: 7.1 G/DL (ref 6–8.5)
SODIUM SERPL-SCNC: 143 MMOL/L (ref 134–144)
TRIGL SERPL-MCNC: 148 MG/DL (ref 0–149)
VLDLC SERPL CALC-MCNC: 30 MG/DL (ref 5–40)

## 2020-01-14 NOTE — TELEPHONE ENCOUNTER
----- Message from Maryana Scott NP sent at 1/14/2020  9:55 AM EST -----  Please call patient cholesterol better but not at goal, plan was to start zetia then add crestor 5mg,  What is he currently taking?

## 2020-01-14 NOTE — PROGRESS NOTES
Please call patient cholesterol better but not at goal, plan was to start zetia then add crestor 5mg,  What is he currently taking?

## 2020-01-29 ENCOUNTER — OFFICE VISIT (OUTPATIENT)
Dept: CARDIOLOGY CLINIC | Age: 55
End: 2020-01-29

## 2020-01-29 VITALS
RESPIRATION RATE: 16 BRPM | WEIGHT: 218.4 LBS | HEART RATE: 77 BPM | DIASTOLIC BLOOD PRESSURE: 90 MMHG | HEIGHT: 71 IN | SYSTOLIC BLOOD PRESSURE: 132 MMHG | OXYGEN SATURATION: 94 % | BODY MASS INDEX: 30.57 KG/M2

## 2020-01-29 DIAGNOSIS — E78.2 MIXED HYPERLIPIDEMIA: ICD-10-CM

## 2020-01-29 DIAGNOSIS — Z95.1 S/P CABG X 2: ICD-10-CM

## 2020-01-29 DIAGNOSIS — I25.10 CORONARY ARTERY DISEASE INVOLVING NATIVE HEART WITHOUT ANGINA PECTORIS, UNSPECIFIED VESSEL OR LESION TYPE: Primary | ICD-10-CM

## 2020-01-29 RX ORDER — ASPIRIN 81 MG/1
TABLET ORAL DAILY
COMMUNITY

## 2020-01-29 RX ORDER — METOPROLOL SUCCINATE 25 MG/1
25 TABLET, EXTENDED RELEASE ORAL
Qty: 90 TAB | Refills: 4 | Status: SHIPPED | OUTPATIENT
Start: 2020-01-29 | End: 2021-02-01

## 2020-01-29 NOTE — PROGRESS NOTES
1. Have you been to the ER, urgent care clinic since your last visit? Hospitalized since your last visit? No    2. Have you seen or consulted any other health care providers outside of the [address] [place] [address] since your last visit? Include any pap smears or colon screening. No     Chief Complaint   Patient presents with    Cholesterol Problem     6 mo f/u    Coronary Artery Disease     6 mo f/u    Medication Evaluation     for metoprolol     Pt reports joint aches with rosuvastatin and atorvastatin.

## 2020-01-29 NOTE — PROGRESS NOTES
Shilo Osorio, [facility]    Subjective/HPI:     Mr. Concha Davila is a 53 y.o. male is here for routine f/u. He has a PMHx of CAD s/p CABG, HLD and hx of tobacco abuse. He is doing well today. He has no chest pain symptoms, orthopnea, PND or edema. He denies dizziness or palpitation symptoms. He denies shortness of breath. He has gained 40 lbs since he quit smoking. He admits he has not been adhering a healthy diet. However, he and his wife have started to make healthier choices. They are following portion control. He has cut out all sugary sodas and is drinking only water. His biggest issue is lunches at work. He is horrible about packing a lunch, and relies on fast foods. He tried rosuvastatin at low dose, however developed similar joint aches. He cannot deal with limited mobility as his job requires constant movement, and he is not one to remain sedentary. PCP Provider  Flor Phelps MD    Patient Active Problem List   Diagnosis Code    NSTEMI (non-ST elevation myocardial infarction) ([facility] [address] I21.4    Mixed hyperlipidemia E78.2    Tobacco use disorder F17.200    CAD (coronary artery disease), native coronary artery I25.10    CAD (coronary artery disease) I25.10    S/P CABG x 2 Z95.1    ASHD (arteriosclerotic heart disease) I25.10    Tobacco abuse Z72.0     Past Surgical History:   Procedure Laterality Date    HX HEENT      ear tubes as a child     History reviewed. No pertinent family history.   Social History     Socioeconomic History    Marital status:      Spouse name: Not on file    Number of children: Not on file    Years of education: Not on file    Highest education level: Not on file   Occupational History    Not on file   Social Needs    Financial resource strain: Not on file    Food insecurity:     Worry: Not on file     Inability: Not on file    Transportation needs:     Medical: Not on file     Non-medical: Not on file   Tobacco Use    Smoking status: Former Smoker     Last attempt to quit: 2018     Years since quittin.1    Smokeless tobacco: Never Used   Substance and Sexual Activity    Alcohol use: Yes     Comment: a few beers a month    Drug use: No    Sexual activity: Not on file   Lifestyle    Physical activity:     Days per week: Not on file     Minutes per session: Not on file    Stress: Not on file   Relationships    Social connections:     Talks on phone: Not on file     Gets together: Not on file     Attends Sikh service: Not on file     Active member of club or organization: Not on file     Attends meetings of clubs or organizations: Not on file     Relationship status: Not on file    Intimate partner violence:     Fear of current or ex partner: Not on file     Emotionally abused: Not on file     Physically abused: Not on file     Forced sexual activity: Not on file   Other Topics Concern    Not on file   Social History Narrative    Not on file       Allergies   Allergen Reactions    Atorvastatin Myalgia    Rosuvastatin Myalgia        Current Outpatient Medications   Medication Sig    aspirin delayed-release 81 mg tablet Take  by mouth daily.  metoprolol succinate (TOPROL-XL) 25 mg XL tablet Take 1 Tab by mouth nightly. Replaces metoprolol tartrate to be taken only at bedtime 2020    ezetimibe (ZETIA) 10 mg tablet Take 1 Tab by mouth daily. No current facility-administered medications for this visit. I have reviewed the problem list, allergy list, medical history, family, social history and medications. Review of Symptoms:    Review of Systems   Constitutional: Negative for chills, fever and weight loss. HENT: Negative for nosebleeds. Eyes: Negative for blurred vision and double vision. Respiratory: Negative for cough, shortness of breath and wheezing. Cardiovascular: Negative for chest pain, palpitations, orthopnea, leg swelling and PND.    Gastrointestinal: Negative for abdominal pain, blood in stool, diarrhea, nausea and vomiting. Musculoskeletal: Negative for joint pain. Skin: Negative for rash. Neurological: Negative for dizziness, tingling and loss of consciousness. Endo/Heme/Allergies: Does not bruise/bleed easily. Physical Exam:      General: Well developed, in no acute distress, cooperative and alert  HEENT: No carotid bruits, no JVD, trach is midline. Neck Supple, PEERL, EOM intact. Heart:  reg rate and rhythm; normal S1/S2; no murmurs, gallops or rubs. Respiratory: Clear bilaterally x 4, no wheezing or rales  Abdomen:   Soft, non-tender, no distention, no masses. + BS. Extremities:  Normal cap refill, no cyanosis, atraumatic. No edema. Neuro: A&Ox3, speech clear, gait stable. Skin: Skin color is normal. No rashes or lesions. Non diaphoretic  Vascular: 2+ pulses symmetric in all extremities    Vitals:    01/29/20 1506 01/29/20 1515   BP: 132/84 132/90   Pulse: 77    Resp: 16    SpO2: 94%    Weight: 218 lb 6.4 oz (99.1 kg)    Height: 5' 11\" (1.803 m)        Cardiographics    ECG: sinus rhythm  Results for orders placed or performed in visit on 12/12/18   CARDIAC HOLTER MONITOR, 24 HOURS    Narrative    ECG Monitor/24 hours, Complete    Reason for Holter Monitor   PALPITATIONS    Heartbeat    Slowest 60  Average 75  Fastest  103      Results:   Underlying Rhythm: Normal sinus rhythm      Atrial Arrhythmias: premature atrial contractions; rare            AV Conduction: normal    Ventricular Arrhythmias: premature ventricular contractions; rare, with brief periods when they are frequent (0.16% of all beats)    ST Segment Analysis:non-specific changes     Symptom Correlation:  Specific symptoms not reported.        Dominique Tenorio MD      Results for orders placed or performed during the hospital encounter of 11/18/18   EKG, 12 LEAD, INITIAL   Result Value Ref Range    Ventricular Rate 88 BPM    Atrial Rate 88 BPM    P-R Interval 160 ms    QRS Duration 96 ms Q-T Interval 376 ms    QTC Calculation (Bezet) 454 ms    Calculated P Axis 62 degrees    Calculated R Axis -32 degrees    Calculated T Axis 69 degrees    Diagnosis       Normal sinus rhythm with sinus arrhythmia  Left axis deviation  Nonspecific ST abnormality  No previous ECGs available  Confirmed by Josephine Marquis ([id number]) on 11/19/2018 9:09:53 AM         Cardiology Labs:  Lab Results   Component Value Date/Time    Cholesterol, total 172 01/13/2020 02:35 PM    HDL Cholesterol 38 (L) 01/13/2020 02:35 PM    LDL, calculated 104 (H) 01/13/2020 02:35 PM    Triglyceride 148 01/13/2020 02:35 PM    CHOL/HDL Ratio 2.7 11/22/2018 05:46 AM       Lab Results   Component Value Date/Time    Sodium 143 01/13/2020 02:35 PM    Potassium 4.0 01/13/2020 02:35 PM    Chloride 104 01/13/2020 02:35 PM    CO2 23 01/13/2020 02:35 PM    Anion gap 5 11/24/2018 06:34 AM    Glucose 90 01/13/2020 02:35 PM    BUN 11 01/13/2020 02:35 PM    Creatinine 1.17 01/13/2020 02:35 PM    BUN/Creatinine ratio 9 01/13/2020 02:35 PM    GFR est AA 81 01/13/2020 02:35 PM    GFR est non-AA 70 01/13/2020 02:35 PM    Calcium 9.5 01/13/2020 02:35 PM    Bilirubin, total 0.5 01/13/2020 02:35 PM    AST (SGOT) 20 01/13/2020 02:35 PM    Alk. phosphatase 71 01/13/2020 02:35 PM    Protein, total 7.1 01/13/2020 02:35 PM    Albumin 4.7 01/13/2020 02:35 PM    Globulin 2.2 11/21/2018 04:20 AM    A-G Ratio 2.0 01/13/2020 02:35 PM    ALT (SGPT) 26 01/13/2020 02:35 PM        Orders Placed This Encounter    LIPID PANEL     Standing Status:   Future     Standing Expiration Date:   7/29/2020    AMB POC EKG ROUTINE W/ 12 LEADS, INTER & REP     Order Specific Question:   Reason for Exam:     Answer:   Routine    aspirin delayed-release 81 mg tablet     Sig: Take  by mouth daily.  metoprolol succinate (TOPROL-XL) 25 mg XL tablet     Sig: Take 1 Tab by mouth nightly.  Replaces metoprolol tartrate to be taken only at bedtime 1/29/2020     Dispense:  90 Tab     Refill:  4 Assessment:     Assessment:       ICD-10-CM ICD-9-CM    1. Coronary artery disease involving native heart without angina pectoris, unspecified vessel or lesion type I25.10 414.01    2. Mixed hyperlipidemia E78.2 272.2 AMB POC EKG ROUTINE W/ 12 LEADS, INTER & REP      LIPID PANEL   3. S/P CABG x 2 Z95.1 V45.81         Plan:     1. Coronary artery disease involving native heart without angina pectoris, unspecified vessel or lesion type  S/p CABGx2 in 2/2018  Doing well; no anginal or anginal equivalent symptoms  Walks 3.5 mph/hr for an hour every day  Will switch Toprol to night time dosing to see if this relieves fatigue. If no improvement, consider d/c med as his ejection fraction is preserved 55-60%. 2. Mixed hyperlipidemia  Intolerant to statin -- rosuvastatin and atorvastatin, due to myalgias  Has been tolerating Zetia  Advised we could also consider pitavastatin although it is often prohibitively expensive  LDL in 1/2020 104 since starting Zetia  Is hesitant to start PCSK9-inhibitors. He wants to work on dietary changes and exercise  We did agree that if his LDL is not at goal in 3 months, then we would start PCSK9-inhibitor therapy. Likely start Praluent at 75 mg every 2 weeks as he does not need to achieve a significant reduction to achieve target LDL. Discussed class at length, side effects, benefits. Check lipids in 3 months    3. S/P CABG x 2  S/p CABG with LIMA to LAD and VG to OM in 2/2018    F/u with me in 12 months, adjust meds based on lipids in the meantime.     Aaron Harden MD

## 2020-01-29 NOTE — LETTER
1/29/20 Patient: Danny Sanders YOB: 1965 Date of Visit: 1/29/2020 Rocío Stevens MD 
[address] [place] [address] [address] [facility] [address] VIA Facsimile: [phone] Dear Rocío Stevens MD, Thank you for referring Mr. Sameer Mattson to [facility] for evaluation. My notes for this consultation are attached. If you have questions, please do not hesitate to call me. I look forward to following your patient along with you.  
 
 
Sincerely, 
 
Donnie Coffey MD

## 2020-02-03 RX ORDER — EZETIMIBE 10 MG/1
TABLET ORAL
Qty: 90 TAB | Refills: 1 | Status: SHIPPED | OUTPATIENT
Start: 2020-02-03 | End: 2020-08-27

## 2020-04-29 DIAGNOSIS — E78.2 MIXED HYPERLIPIDEMIA: ICD-10-CM

## 2020-08-27 RX ORDER — EZETIMIBE 10 MG/1
TABLET ORAL
Qty: 90 TAB | Refills: 1 | Status: SHIPPED | OUTPATIENT
Start: 2020-08-27 | End: 2021-02-24

## 2021-02-03 ENCOUNTER — OFFICE VISIT (OUTPATIENT)
Dept: CARDIOLOGY CLINIC | Age: 56
End: 2021-02-03
Payer: COMMERCIAL

## 2021-02-03 VITALS
SYSTOLIC BLOOD PRESSURE: 120 MMHG | RESPIRATION RATE: 15 BRPM | WEIGHT: 217.4 LBS | DIASTOLIC BLOOD PRESSURE: 90 MMHG | HEART RATE: 100 BPM | OXYGEN SATURATION: 94 % | BODY MASS INDEX: 30.44 KG/M2 | HEIGHT: 71 IN

## 2021-02-03 DIAGNOSIS — E78.2 MIXED HYPERLIPIDEMIA: ICD-10-CM

## 2021-02-03 DIAGNOSIS — Z95.1 S/P CABG X 2: ICD-10-CM

## 2021-02-03 DIAGNOSIS — I25.10 CORONARY ARTERY DISEASE INVOLVING NATIVE HEART WITHOUT ANGINA PECTORIS, UNSPECIFIED VESSEL OR LESION TYPE: Primary | ICD-10-CM

## 2021-02-03 DIAGNOSIS — I21.4 NSTEMI (NON-ST ELEVATION MYOCARDIAL INFARCTION) (HCC): ICD-10-CM

## 2021-02-03 PROCEDURE — 93000 ELECTROCARDIOGRAM COMPLETE: CPT | Performed by: INTERNAL MEDICINE

## 2021-02-03 PROCEDURE — 99214 OFFICE O/P EST MOD 30 MIN: CPT | Performed by: INTERNAL MEDICINE

## 2021-02-03 NOTE — PROGRESS NOTES
[address], [address] [facility]  [phone]     Subjective:      Daylin De Leon is a 63 y.o. male is here for routine f/u. He has a PMHx of CAD s/p CABG, HLD and hx of tobacco abuse. Last seen by us in 2020.  He is not exercising as much lately as he used to because of Covid. The patient denies chest pain/ shortness of breath, orthopnea, PND, LE edema, palpitations, syncope, or presyncope. Patient Active Problem List    Diagnosis Date Noted    ASHD (arteriosclerotic heart disease) 2018    Tobacco abuse 2018    S/P CABG x 2 2018    CAD (coronary artery disease) 2018    CAD (coronary artery disease), native coronary artery 2018    NSTEMI (non-ST elevation myocardial infarction) ([facility] [address] 2018    Mixed hyperlipidemia 2018    Tobacco use disorder 2018      Moshe aRngel MD  No past medical history on file. Past Surgical History:   Procedure Laterality Date    HX HEENT      ear tubes as a child     Allergies   Allergen Reactions    Atorvastatin Myalgia    Rosuvastatin Myalgia      No family history on file.    Social History     Socioeconomic History    Marital status:      Spouse name: Not on file    Number of children: Not on file    Years of education: Not on file    Highest education level: Not on file   Occupational History    Not on file   Social Needs    Financial resource strain: Not on file    Food insecurity     Worry: Not on file     Inability: Not on file    Transportation needs     Medical: Not on file     Non-medical: Not on file   Tobacco Use    Smoking status: Former Smoker     Quit date: 2018     Years since quittin.1    Smokeless tobacco: Never Used   Substance and Sexual Activity    Alcohol use: Yes     Frequency: 2-4 times a month     Comment: a few beers a month    Drug use: No    Sexual activity: Not on file   Lifestyle    Physical activity     Days per week: Not on file     Minutes per session: Not on file    Stress: Not on file   Relationships    Social connections     Talks on phone: Not on file     Gets together: Not on file     Attends [employer] service: Not on file     Active member of club or organization: Not on file     Attends meetings of clubs or organizations: Not on file     Relationship status: Not on file    Intimate partner violence     Fear of current or ex partner: Not on file     Emotionally abused: Not on file     Physically abused: Not on file     Forced sexual activity: Not on file   Other Topics Concern    Not on file   Social History Narrative    Not on file      Current Outpatient Medications   Medication Sig    metoprolol succinate (TOPROL-XL) 25 mg XL tablet TAKE 1 TABLET BY MOUTH NIGHTLY    ezetimibe (ZETIA) 10 mg tablet TAKE 1 TABLET BY MOUTH EVERY DAY    aspirin delayed-release 81 mg tablet Take  by mouth daily. No current facility-administered medications for this visit. Review of Symptoms:  11 systems reviewed, negative other than as stated in the HPI    Physical ExamPhysical Exam:    Vitals:    02/03/21 1456   BP: (!) 120/90   Pulse: 100   Resp: 15   SpO2: 94%   Weight: 217 lb 6.4 oz (98.6 kg)   Height: 5' 11\" (1.803 m)     Body mass index is 30.32 kg/m². General PE  Gen:  NAD  Mental Status - Alert. General Appearance - Not in acute distress. HEENT:  PERRL, no carotid bruits or JVD  Chest and Lung Exam   Inspection: Accessory muscles - No use of accessory muscles in breathing. Auscultation:   Breath sounds: - Normal.   Cardiovascular   Inspection: Jugular vein - Bilateral - Inspection Normal.   Palpation/Percussion:   Apical Impulse: - Normal.   Auscultation: Rhythm - Regular. Heart Sounds - S1 WNL and S2 WNL. No S3 or S4. Murmurs & Other Heart Sounds: Auscultation of the heart reveals - No Murmurs. Peripheral Vascular   Upper Extremity: Inspection - Bilateral - No Cyanotic nailbeds or Digital clubbing.    Lower Extremity:   Palpation: Edema - Bilateral - No edema. Abdomen:   Soft, non-tender, bowel sounds are active. Neuro: A&O times 3, CN and motor grossly WNL    Labs:   Lab Results   Component Value Date/Time    Cholesterol, total 172 01/13/2020 02:35 PM    Cholesterol, total 228 (H) 07/09/2019 07:49 AM    Cholesterol, total 64 11/22/2018 05:46 AM    Cholesterol, total 162 11/19/2018 03:36 AM    HDL Cholesterol 38 (L) 01/13/2020 02:35 PM    HDL Cholesterol 38 (L) 07/09/2019 07:49 AM    HDL Cholesterol 24 11/22/2018 05:46 AM    HDL Cholesterol 31 11/19/2018 03:36 AM    LDL, calculated 104 (H) 01/13/2020 02:35 PM    LDL, calculated 140 (H) 07/09/2019 07:49 AM    LDL, calculated 23.8 11/22/2018 05:46 AM    LDL, calculated 103.8 (H) 11/19/2018 03:36 AM    Triglyceride 148 01/13/2020 02:35 PM    Triglyceride 248 (H) 07/09/2019 07:49 AM    Triglyceride 81 11/22/2018 05:46 AM    Triglyceride 136 11/19/2018 03:36 AM    CHOL/HDL Ratio 2.7 11/22/2018 05:46 AM    CHOL/HDL Ratio 5.2 (H) 11/19/2018 03:36 AM     No results found for: CPK, CPKX, CPX  Lab Results   Component Value Date/Time    Sodium 143 01/13/2020 02:35 PM    Potassium 4.0 01/13/2020 02:35 PM    Chloride 104 01/13/2020 02:35 PM    CO2 23 01/13/2020 02:35 PM    Anion gap 5 11/24/2018 06:34 AM    Glucose 90 01/13/2020 02:35 PM    BUN 11 01/13/2020 02:35 PM    Creatinine 1.17 01/13/2020 02:35 PM    BUN/Creatinine ratio 9 01/13/2020 02:35 PM    GFR est AA 81 01/13/2020 02:35 PM    GFR est non-AA 70 01/13/2020 02:35 PM    Calcium 9.5 01/13/2020 02:35 PM    Bilirubin, total 0.5 01/13/2020 02:35 PM    Alk. phosphatase 71 01/13/2020 02:35 PM    Protein, total 7.1 01/13/2020 02:35 PM    Albumin 4.7 01/13/2020 02:35 PM    Globulin 2.2 11/21/2018 04:20 AM    A-G Ratio 2.0 01/13/2020 02:35 PM    ALT (SGPT) 26 01/13/2020 02:35 PM       EKG:  NSR, NS changes       Assessment:        1.  Coronary artery disease involving native heart without angina pectoris, unspecified vessel or lesion type    2. S/P CABG x 2    3. Mixed hyperlipidemia    4. NSTEMI (non-ST elevation myocardial infarction) ([facility] [address]        Orders Placed This Encounter    AMB POC EKG ROUTINE W/ 12 LEADS, INTER & REP     Order Specific Question:   Reason for Exam:     Answer:   routine        Plan:     Coronary artery disease involving native heart without angina pectoris, unspecified vessel or lesion type  S/p CABG with LIMA to LAD and VG to OM in 2/2018  Doing well; no anginal or anginal equivalent symptoms  Last seen by us in 1/2020.    He is not exercising as much lately as he used to because of Covid. Continue ASA BB zetia    Bp controlled     Mixed hyperlipidemia  Intolerant to 2 statins -- rosuvastatin and atorvastatin, due to myalgias  Has been tolerating Zetia  Willing to try pitavastatin due to mortality reduction with statins  LDL in 1/2020 104 since starting Zetia  Is hesitant to start PCSK9-inhibitors. He might consider it due to mortality reduction with this class of medications if he does not tolerate or cannot afford pitavastatin  Discussed class at length, side effects, benefits. Ordered lipids last OV, not done  Order again today, with repeat lipids in 3 months, after starting pitavastatin-he will let us know if he cannot afford it     Counseled on diet and exercise- eventual goal of 30-60 minutes 5-7 times a week as per AHA guidelines. Continue current care and f/u in 6 months, sooner PRN.       Karen Morley MD

## 2021-02-03 NOTE — PROGRESS NOTES
1. Have you been to the ER, urgent care clinic since your last visit? Hospitalized since your last visit? No.    2. Have you seen or consulted any other health care providers outside of the [address] [place] [address] since your last visit? Include any pap smears or colon screening.    No.      Chief Complaint   Patient presents with    Annual Exam     pt denies any cardiac symptoms

## 2021-02-03 NOTE — LETTER
2/3/2021 Patient: Zach Frank YOB: 1965 Date of Visit: 2/3/2021 Kavon Rodriguez MD 
[address] 
[address] [address] Via Fax: [phone] Dear Kavon Rodriguez MD, Thank you for referring Mr. Isabell Lennon to [facility] for evaluation. My notes for this consultation are attached. If you have questions, please do not hesitate to call me. I look forward to following your patient along with you.  
 
 
Sincerely, 
 
Ally Saavedra MD

## 2021-03-03 ENCOUNTER — TELEPHONE (OUTPATIENT)
Dept: CARDIOLOGY CLINIC | Age: 56
End: 2021-03-03

## 2021-03-03 NOTE — TELEPHONE ENCOUNTER
Received copy of labs from PCP. . Did he start pitavastatin?  If he did he should reconsider PCSK9 inhibitor meds

## 2021-05-03 DIAGNOSIS — E78.2 MIXED HYPERLIPIDEMIA: ICD-10-CM

## 2021-05-03 DIAGNOSIS — I21.4 NSTEMI (NON-ST ELEVATION MYOCARDIAL INFARCTION) (HCC): ICD-10-CM

## 2021-05-03 DIAGNOSIS — Z95.1 S/P CABG X 2: ICD-10-CM

## 2021-05-03 DIAGNOSIS — I25.10 CORONARY ARTERY DISEASE INVOLVING NATIVE HEART WITHOUT ANGINA PECTORIS, UNSPECIFIED VESSEL OR LESION TYPE: ICD-10-CM

## 2021-08-03 PROBLEM — I25.10 CAD (CORONARY ARTERY DISEASE): Status: RESOLVED | Noted: 2018-11-20 | Resolved: 2021-08-03

## 2021-08-03 PROBLEM — I25.10 CAD (CORONARY ARTERY DISEASE), NATIVE CORONARY ARTERY: Status: RESOLVED | Noted: 2018-11-19 | Resolved: 2021-08-03

## 2021-08-03 NOTE — PROGRESS NOTES
[address] [facility], [place], [address] [facility]  [phone]     Subjective:      Vanessa Cummings is a 61 y.o. male is here for routine f/u. He has a PMHx of CAD s/p CABG, HLD and hx of tobacco abuse. Last seen by us in 2/3/2021. At that time we restarted Livalo. Today,  he reports stopping Livalo due to diffuse muscle pain, same thing he felt with prev statin. He continues to stay active; walks up and down steps, able to perform physical activities without exertional symptoms. The patient denies chest pain/ shortness of breath, orthopnea, PND, LE edema, palpitations, syncope, or presyncope.        Patient Active Problem List    Diagnosis Date Noted    ASHD (arteriosclerotic heart disease) 2018    Tobacco abuse 2018    S/P CABG x 2 2018    NSTEMI (non-ST elevation myocardial infarction) ([facility] [address] 2018    Mixed hyperlipidemia 2018    Tobacco use disorder 2018      Patricia Carrizales MD  Past Medical History:   Diagnosis Date    Hyperlipidemia     Myocardial infarction [facility])       Past Surgical History:   Procedure Laterality Date    HX CORONARY ARTERY BYPASS GRAFT      HX HEART CATHETERIZATION      HX HEENT      ear tubes as a child     Allergies   Allergen Reactions    Atorvastatin Myalgia    Rosuvastatin Myalgia    Livalo [Pitavastatin] Myalgia      Family History   Problem Relation Age of Onset    Diabetes Mother     Diabetes Brother       Social History     Socioeconomic History    Marital status:      Spouse name: Not on file    Number of children: Not on file    Years of education: Not on file    Highest education level: Not on file   Occupational History    Not on file   Tobacco Use    Smoking status: Former Smoker     Packs/day: 1.00     Years: 20.00     Pack years: 20.00     Types: Cigarettes     Quit date: 2018     Years since quittin.7    Smokeless tobacco: Never Used   Substance and Sexual Activity    Alcohol use: Yes     Alcohol/week: 1.0 standard drinks     Types: 1 Cans of beer per week     Comment: a few beers a month    Drug use: No    Sexual activity: Not on file   Other Topics Concern    Not on file   Social History Narrative    Not on file     Social Determinants of Health     Financial Resource Strain:     Difficulty of Paying Living Expenses:    Food Insecurity:     Worried About Running Out of Food in the Last Year:     [address] in the Last Year:    Transportation Needs:     Lack of Transportation (Medical):  Lack of Transportation (Non-Medical):    Physical Activity:     Days of Exercise per Week:     Minutes of Exercise per Session:    Stress:     Feeling of Stress :    Social Connections:     Frequency of Communication with Friends and Family:     Frequency of Social Gatherings with Friends and Family:     Attends [employer] Services:     Active Member of Clubs or Organizations:     Attends Club or Organization Meetings:     Marital Status:    Intimate Partner Violence:     Fear of Current or Ex-Partner:     Emotionally Abused:     Physically Abused:     Sexually Abused:       Current Outpatient Medications   Medication Sig    multivitamin (ONE A DAY) tablet Take 1 Tablet by mouth daily.  metoprolol succinate (TOPROL-XL) 25 mg XL tablet TAKE 1 TABLET BY MOUTH EVERY DAY AT NIGHT    ezetimibe (ZETIA) 10 mg tablet TAKE 1 TABLET BY MOUTH EVERY DAY    aspirin delayed-release 81 mg tablet Take  by mouth daily. No current facility-administered medications for this visit. Review of Symptoms:  11 systems reviewed, negative other than as stated in the HPI    Physical ExamPhysical Exam:    Vitals:    08/17/21 1600 08/17/21 1610 08/17/21 1630   BP: (!) 152/94 (!) 152/92 130/76   Pulse: 63     Resp: 18     SpO2: 96%     Weight: 213 lb (96.6 kg)     Height: 5' 11\" (1.803 m)       Body mass index is 29.71 kg/m². General PE  Gen:  NAD  Mental Status - Alert.  General Appearance - Not in acute distress. HEENT:  PERRL, no carotid bruits or JVD  Chest and Lung Exam   Inspection: Accessory muscles - No use of accessory muscles in breathing. Auscultation:   Breath sounds: - Normal.   Cardiovascular   Inspection: Jugular vein - Bilateral - Inspection Normal.   Palpation/Percussion:   Apical Impulse: - Normal.   Auscultation: Rhythm - Regular. Heart Sounds - S1 WNL and S2 WNL. No S3 or S4. Murmurs & Other Heart Sounds: Auscultation of the heart reveals - No Murmurs. Peripheral Vascular   Upper Extremity: Inspection - Bilateral - No Cyanotic nailbeds or Digital clubbing. Lower Extremity:   Palpation: Edema - Bilateral - No edema. Abdomen:   Soft, non-tender, bowel sounds are active.   Neuro: A&O times 3, CN and motor grossly WNL    Labs:   Lab Results   Component Value Date/Time    Cholesterol, total 172 01/13/2020 02:35 PM    Cholesterol, total 228 (H) 07/09/2019 07:49 AM    Cholesterol, total 64 11/22/2018 05:46 AM    Cholesterol, total 162 11/19/2018 03:36 AM    HDL Cholesterol 38 (L) 01/13/2020 02:35 PM    HDL Cholesterol 38 (L) 07/09/2019 07:49 AM    HDL Cholesterol 24 11/22/2018 05:46 AM    HDL Cholesterol 31 11/19/2018 03:36 AM    LDL, calculated 104 (H) 01/13/2020 02:35 PM    LDL, calculated 140 (H) 07/09/2019 07:49 AM    LDL, calculated 23.8 11/22/2018 05:46 AM    LDL, calculated 103.8 (H) 11/19/2018 03:36 AM    Triglyceride 148 01/13/2020 02:35 PM    Triglyceride 248 (H) 07/09/2019 07:49 AM    Triglyceride 81 11/22/2018 05:46 AM    Triglyceride 136 11/19/2018 03:36 AM    CHOL/HDL Ratio 2.7 11/22/2018 05:46 AM    CHOL/HDL Ratio 5.2 (H) 11/19/2018 03:36 AM     No results found for: CPK, CPKX, CPX  Lab Results   Component Value Date/Time    Sodium 143 01/13/2020 02:35 PM    Potassium 4.0 01/13/2020 02:35 PM    Chloride 104 01/13/2020 02:35 PM    CO2 23 01/13/2020 02:35 PM    Anion gap 5 11/24/2018 06:34 AM    Glucose 90 01/13/2020 02:35 PM    BUN 11 01/13/2020 02:35 PM Creatinine 1.17 01/13/2020 02:35 PM    BUN/Creatinine ratio 9 01/13/2020 02:35 PM    GFR est AA 81 01/13/2020 02:35 PM    GFR est non-AA 70 01/13/2020 02:35 PM    Calcium 9.5 01/13/2020 02:35 PM    Bilirubin, total 0.5 01/13/2020 02:35 PM    Alk. phosphatase 71 01/13/2020 02:35 PM    Protein, total 7.1 01/13/2020 02:35 PM    Albumin 4.7 01/13/2020 02:35 PM    Globulin 2.2 11/21/2018 04:20 AM    A-G Ratio 2.0 01/13/2020 02:35 PM    ALT (SGPT) 26 01/13/2020 02:35 PM       EKG:  NSR, NS changes       Assessment:        1. Coronary artery disease involving native heart without angina pectoris, unspecified vessel or lesion type    2. S/P CABG x 2    3. Mixed hyperlipidemia    4. Tobacco use disorder    5. Statin intolerance        Orders Placed This Encounter    METABOLIC PANEL, COMPREHENSIVE     Standing Status:   Future     Number of Occurrences:   1     Standing Expiration Date:   8/17/2022    LIPID PANEL     Standing Status:   Future     Number of Occurrences:   1     Standing Expiration Date:   8/17/2022    AMB POC EKG ROUTINE W/ 12 LEADS, INTER & REP     Order Specific Question:   Reason for Exam:     Answer:   routine    multivitamin (ONE A DAY) tablet     Sig: Take 1 Tablet by mouth daily. Plan:     Coronary artery disease involving native heart without angina pectoris, unspecified vessel or lesion type  S/p CABG with LIMA to LAD and VG to OM in 2/2018  Stable. Continue ASA BB zetia    Bp controlled with BB. Stable kidney fxn  Serum Cr 1.07 in 3/2021     Mixed hyperlipidemia  Now Intolerant to 3 statins -- rosuvastatin, atorvastatin and livalo due to myalgias  Has been tolerating Zetia  Ordered Livalo 2 mg last OV 2/2021  But he dc due to myalgia as stated above.  PCSK9 inh therapy is cost prohibitive  3/2021  drawn by PCP  Will order labs for 3/2022 per his request      Hx tobacco abuse  Has not smoked since 11/2019      Counseled on diet and exercise- eventual goal of 30-60 minutes 5-7 times a week as per AHA guidelines.           Continue current care and f/u in 1 yr    Maximo Vinson, NP

## 2021-08-17 ENCOUNTER — OFFICE VISIT (OUTPATIENT)
Dept: CARDIOLOGY CLINIC | Age: 56
End: 2021-08-17
Payer: COMMERCIAL

## 2021-08-17 VITALS
HEIGHT: 71 IN | HEART RATE: 63 BPM | DIASTOLIC BLOOD PRESSURE: 76 MMHG | SYSTOLIC BLOOD PRESSURE: 130 MMHG | BODY MASS INDEX: 29.82 KG/M2 | OXYGEN SATURATION: 96 % | RESPIRATION RATE: 18 BRPM | WEIGHT: 213 LBS

## 2021-08-17 DIAGNOSIS — E78.2 MIXED HYPERLIPIDEMIA: ICD-10-CM

## 2021-08-17 DIAGNOSIS — I25.10 CORONARY ARTERY DISEASE INVOLVING NATIVE HEART WITHOUT ANGINA PECTORIS, UNSPECIFIED VESSEL OR LESION TYPE: Primary | ICD-10-CM

## 2021-08-17 DIAGNOSIS — F17.200 TOBACCO USE DISORDER: ICD-10-CM

## 2021-08-17 DIAGNOSIS — Z78.9 STATIN INTOLERANCE: ICD-10-CM

## 2021-08-17 DIAGNOSIS — Z95.1 S/P CABG X 2: ICD-10-CM

## 2021-08-17 PROCEDURE — 99214 OFFICE O/P EST MOD 30 MIN: CPT | Performed by: NURSE PRACTITIONER

## 2021-08-17 PROCEDURE — 93000 ELECTROCARDIOGRAM COMPLETE: CPT | Performed by: NURSE PRACTITIONER

## 2021-08-17 RX ORDER — BISMUTH SUBSALICYLATE 262 MG
1 TABLET,CHEWABLE ORAL DAILY
COMMUNITY

## 2021-08-17 NOTE — PROGRESS NOTES
1. Have you been to the ER, urgent care clinic since your last visit? Hospitalized since your last visit? No    2. Have you seen or consulted any other health care providers outside of the [address] [place] [address] since your last visit? Include any pap smears or colon screening. No      Chief Complaint   Patient presents with    Coronary Artery Disease     6 mo appt. No cardiac concerns.

## 2022-03-18 PROBLEM — E78.2 MIXED HYPERLIPIDEMIA: Status: ACTIVE | Noted: 2018-11-18

## 2022-03-18 PROBLEM — I25.10 ASHD (ARTERIOSCLEROTIC HEART DISEASE): Status: ACTIVE | Noted: 2018-12-12

## 2022-03-19 PROBLEM — F17.200 TOBACCO USE DISORDER: Status: ACTIVE | Noted: 2018-11-18

## 2022-03-19 PROBLEM — Z72.0 TOBACCO ABUSE: Status: ACTIVE | Noted: 2018-12-12

## 2022-03-19 PROBLEM — Z95.1 S/P CABG X 2: Status: ACTIVE | Noted: 2018-11-21

## 2022-03-19 PROBLEM — I21.4 NSTEMI (NON-ST ELEVATION MYOCARDIAL INFARCTION) (HCC): Status: ACTIVE | Noted: 2018-11-18
